# Patient Record
Sex: MALE | Race: OTHER | HISPANIC OR LATINO | Employment: OTHER | URBAN - METROPOLITAN AREA
[De-identification: names, ages, dates, MRNs, and addresses within clinical notes are randomized per-mention and may not be internally consistent; named-entity substitution may affect disease eponyms.]

---

## 2017-11-14 DIAGNOSIS — M48.061 STENOSIS, SPINAL, LUMBAR: ICD-10-CM

## 2017-11-14 DIAGNOSIS — M47.816 LUMBAR SPONDYLOSIS: Primary | ICD-10-CM

## 2017-11-14 DIAGNOSIS — M51.26 DISPLACEMENT OF LUMBAR INTERVERTEBRAL DISC WITHOUT MYELOPATHY: ICD-10-CM

## 2017-11-27 ENCOUNTER — CLINICAL SUPPORT (OUTPATIENT)
Dept: REHABILITATION | Facility: HOSPITAL | Age: 77
End: 2017-11-27
Attending: PAIN MEDICINE
Payer: MEDICARE

## 2017-11-27 DIAGNOSIS — R29.898 WEAKNESS OF BOTH LOWER EXTREMITIES: ICD-10-CM

## 2017-11-27 DIAGNOSIS — M62.81 WEAKNESS OF TRUNK MUSCULATURE: ICD-10-CM

## 2017-11-27 DIAGNOSIS — M25.659 DECREASED RANGE OF MOTION OF HIP: ICD-10-CM

## 2017-11-27 DIAGNOSIS — M67.80 POOR FLEXIBILITY OF TENDON: ICD-10-CM

## 2017-11-27 PROCEDURE — 97161 PT EVAL LOW COMPLEX 20 MIN: CPT | Mod: PN

## 2017-11-27 PROCEDURE — 97110 THERAPEUTIC EXERCISES: CPT | Mod: PN

## 2017-11-27 PROCEDURE — G8978 MOBILITY CURRENT STATUS: HCPCS | Mod: CL,PN

## 2017-11-27 PROCEDURE — G8979 MOBILITY GOAL STATUS: HCPCS | Mod: CK,PN

## 2017-11-27 NOTE — PROGRESS NOTES
Physical Therapy Evaluation    Name: Javid Daniel  Clinic Number: [id number]      Diagnosis:   Encounter Diagnoses   Name Primary?    Weakness of trunk musculature     Poor flexibility of tendon     Decreased range of motion of hip     Weakness of both lower extremities      Physician: Yehuda Degroot Jr.,*  Treatment Orders: PT Eval and Treat    No past medical history on file.  No current outpatient prescriptions on file.     No current facility-administered medications for this visit.      Review of patient's allergies indicates:  Allergies not on file      Evaluation Date: 11/27/2017  Visit # authorized:   Authorization period:   To Vendor Referred By By Location/POS By Department   none Yehuda Degroot Jr., MD [facility]   Priority Start Date Expiration Date Referral Entered By   Routine 11/27/2017 12/31/2017 Tamra Winn   Visits Requested Visits Authorized Visits Completed Visits Scheduled   1 12 0 1     Time Start: 10:00 am  Time End:11:00 am  Total min: 60 min    Ayla Hua is a 77 y.o. male that presents to [facility] outpatient clinic secondary to chronic back pain. Pt ambulates with his son to therapy. Pt speaks most Japanese. Onset of pain was greater 1 years ago. SHI: Pt does not know the mechanism of injury.  Pt states he has greater back pain on left lower back compare to right lower back. Pt states his lower back pain is localized on lower back    Precautions: None  Patient c/o: continuous/intermittent symptoms  Radicular symptoms: No  Onset:: insidious/sudden/gradual   Pain Scale: Javid rates pain on a scale of 0-10 to be 10 at worst; 8 currently; 1 at best .  Aggravating factors: bending forward, sleeping on sides, and lifting weights.   Pain with coughing/sneezing, B&B, sleep disturbance: Yes  Relieving factors: Medications  Previous treatment: Yes  Past surgical history: No  Functional deficits: ADL's  Prior level of function:  Independent  Occupation: retired, work duties include:   Environment: No AD  No cultural or spiritual barriers identified to treatment or learning.  Patient's goals: Pt's goals are decrease lower back pain       Objective     Observation:     Posture Alignment: slouched posture;forward head;    Sensation: intact to light touch    DTR:: intact to light touch    GAIT DEVIATIONS: Javid displays decreased weight shift;decreased arm swing    Lumbar Range of Motion:    %   Flexion WNL with pain     Extension WNL     Left Side Bending WNL   Right Side Bending WNL   Left rotation   WNL   Right Rotation   WNL    *= pain    Passive hip ROM in degrees:     Left Right   IR 32 34   ER 19 25     Lower Extremity Strength    Right LE  Left LE    Hip flexion: 3+/5 Hip flexion: 3+/5   Knee extension: 4/5 Knee extension: 4/5   Knee flexion: 4/5 Knee flexion: 4/5   Hip IR: 3+/5 Hip IR: 3+/5   Hip ER: 3+/5 Hip ER: 3+/5   Hip extension:  3+/5 Hip extension: 3+/5   Hip abduction: 3+/5 Hip abduction: 3+/5   Hip adduction: 3+/5 Hip adduction 3+/5   Ankle dorsiflexion: 4/5 Ankle dorsiflexion: 4/5   Ankle plantarflexion: 4/5 Ankle plantarflexion: 4/5     Special Tests:  -Quadrant testing: positive  -ALEX: negative  -Scour: negative  -Repeated Flexion: positive  -Repeated Ext:negative  -Heel walking: negative  -Toe walking: negative  -Bridge test positive      Neuro Dynamic Testing:    Sciatic nerve:      SLR: negative   Neural Tension:     Slump test: negative    Palpation: Pain and tissue restriction noted in left  Lumbar paraspinal muscles, gluteus sharda, and quadratus lumborum.     Flexibility:    Ely's test: R = 105 degrees ; L = 106 degrees   Popliteal Angle: R = 159 degrees ; L = 160 degrees   Herman's test: NP    CMS Impairment/Limitation/Restriction for FOTO Lumbar Spine Survey  Status Limitation G-Code CMS Severity Modifier  Intake 32% 68% Current Status CL - At least 60 percent but less than 80 percent  Predicted 50% 50% Goal  Status+ CK - At least 40 percent but less than 60 percent  D/C Status CL **only report if this is a one time visit  +Based on FOTO predicted change score    PT Evaluation Completed? Yes  Discussed Plan of Care with patient: Yes    TREATMENT:  Javid received therapeutic exercises to develop strength and endurance, flexibility for 10 minutes including:  Lower trunk rotation  Pelvic tilt  Brace supine marching  Bridging    Javid  received the following manual therapy techniques x 0 min: Joint mobilizations and soft tissue mobilization were applied to the N/A to improve/decrease N/A     Pt received cold pack x 0 min to N/A following treatment.    HEP provided:   Instructed pt. Regarding: Proper technique with all exercises. Pt demo good understanding of the education provided. Javid demonstrated good return demonstration of activities.      Assessment     This is a 77 y.o. male referred to outpatient physical therapy and presents with a medical diagnosis of chronic lower back pain and demonstrates limitations as described in the problem list. Pt only speak [place]ian during initial evaluation. Pt will benefit from physical therapy services in order to maximize pain free functional independence. Pt presented with decrease lower extremity strength, hip range of motion, and hamstring and quadriceps flexibility. Pt presented with positive in bridge test for poor core endurance and weakness. Pt presents with pain during trunk flexion, but no pain during trunk extension. Pt The following goals were discussed with the patient and patient is in agreement with them as to be addressed in the treatment plan. Pt was given a HEP consisting of strengthening. Pt verbally understood the instructions as they were given and demonstrated proper form and technique during therapy. Pt was advised to perform these exercises free of pain, and to stop performing them if pain occurs.     History  Co-morbidities and personal factors that may  impact the plan of care Examination  Body Structures and Functions, activity limitations and participation restrictions that may impact the plan of care    Clinical Presentation   Co-morbidities:   Not identified        Personal Factors:   no deficits Body Regions:   back    Body Systems:    ROM  strength  Flexibility            Participation Restrictions:   Social events      Activity limitations:   Learning and applying knowledge  no deficits    General Tasks and Commands  no deficits    Communication  Citizen of [place] speaker only    Mobility  lifting and carrying objects  walking    Self care  no deficits    Domestic Life  no deficits    Interactions/Relationships  no deficits    Life Areas  no deficits    Community and Social Life  no deficits         stable and uncomplicated                      Complexity: low  FOTO see above           Prognosis: Fair    Anticipated barriers to physical therapy: Pt only speak Indonesian.     Medical necessity is demonstrated by the following IMPAIRMENTS/PROBLEM LIST:   1) Increase in pain level limiting function   2) Decrease muscle strength   3) Decrease ROM   4) Decrease flexibility   5) Lack of HEP          GOALS: Short Term Goals:  2-3 weeks  1. Report decreased in pain at worse less than  <   / =  5  /10  to increase tolerance for functional activities  2. Pt to increase B popliteal angle by greater than > or = 5 degrees in order to improve flexibility and posture.   3. Increased bilateral lower extremity MMT 1/2  to increase tolerance for ADL and work activities.  4. Pt to increase B Ely's Test by greater than  > or = 5 degrees in order to improve flexibility and posture.   5. Pt to tolerate HEP to improve ROM and independence with ADL's  6. Pt to improve hip range of motion by 25% to allow for improved functional mobility to allow for improvement in IADLs.     Long Term Goals: 6-8 weeks  1. Report decreased in pain at worse less than  <   / =  0 or 1  /10  to increase tolerance  for functional mobility.   2 .Pt to increase B popliteal angle by greater than > or = 10 degrees in order to improve flexibility and posture.   3. Increased bilateral lower extremity MMT 1 grade to increase tolerance for ADL and work activities.  4. Pt will report at 50% or less limitation on FOTO lumbar spine survey  to demonstrate decrease in disability and improvement in back pain.  5. Pt to be Independent with HEP to improve ROM and independence with ADL's  6. Pt to increase B Ely's Test by greater than > or = 10 degrees in order to improve flexibility and posture.   7. Pt to demonstrate negative Bridge Test in order to show improved core strength for lumbar stabilization.   8. Pt to improve  Hip range of motion by 75% to allow for improved functional mobility to allow for improvement in IADLs.       Plan     Pt will be treated by physical therapy 1-2 times a week for 6-8 weeks for Pt Education, HEP, therapeutic exercises, neuromuscular re-education, manual therapy, joint mobilizations, taping techniques, functional dry needling, modalities prn to achieve established goals. Javid may at times be seen by a PTA as part of the Rehab Team. Cont PT for 6-8  weeks.     Certification date: 11/27/2017 to 02/27/2017    I certify the need for these services furnished under this plan of treatment and while under my care.______________________________ Physician/Referring Practitioner  Date of Signature    Travis Ceballos PT, DPT  Date:11/27/2017

## 2017-12-05 ENCOUNTER — CLINICAL SUPPORT (OUTPATIENT)
Dept: REHABILITATION | Facility: HOSPITAL | Age: 77
End: 2017-12-05
Attending: PAIN MEDICINE
Payer: MEDICARE

## 2017-12-05 DIAGNOSIS — R29.898 WEAKNESS OF BOTH LOWER EXTREMITIES: ICD-10-CM

## 2017-12-05 DIAGNOSIS — M62.81 WEAKNESS OF TRUNK MUSCULATURE: ICD-10-CM

## 2017-12-05 DIAGNOSIS — M25.659 DECREASED RANGE OF MOTION OF HIP: ICD-10-CM

## 2017-12-05 DIAGNOSIS — M67.80 POOR FLEXIBILITY OF TENDON: ICD-10-CM

## 2017-12-05 PROCEDURE — 97110 THERAPEUTIC EXERCISES: CPT | Mod: PN

## 2017-12-05 NOTE — PROGRESS NOTES
Physical Therapy Daily Note     Name: Javid Daniel  Clinic Number: [id number]  Diagnosis:   Encounter Diagnoses   Name Primary?    Weakness of trunk musculature     Poor flexibility of tendon     Decreased range of motion of hip     Weakness of both lower extremities      Physician: Yehuda Degroot Jr.,*  Precautions: Standard  Visit #: 2 of 12  LENZ:12/31/2017  PTA Visit #: 0    Time In: 9:30 am  Time Out: 10:30 am   Total time: 60 min (1:1 with PT for 30' )    Subjective     Pt reports: that his pain is a little today. Pt states he has more morning pain. Pt reports his lower back pain is less at night  Pain Scale: Javid rates pain on a scale of 0-10 to be 2 currently. Lumbar pain.     Objective     Javid received individual therapeutic exercises to develop strength, endurance, ROM, flexibility and core stabilization for 53 minutes including:    Nustetp 8 min  Lower trunk rotation 3x10  Pelvic tilt 1x2 min  Brace supine marching 3x10   Prayer stretch 3x45 sec hold  Supine Hamstring stretch 4x30' hold  Prone quadriceps stretch 4x30' hold  Supine hip add ball squeeze 3x10  Bridging 3x10  SL hip abd 3x10  SL hip add 3x10   Prone hip extension 3x10  Standing Marching 3x10  Standing mini squat 3x10    Javid received the following manual therapy techniques: Soft tissue Mobilization were applied to the: N/a for 00 minutes including:      Written Home Exercises Provided:   Pt demo good understanding of the education provided. Javid demonstrated good return demonstration of activities.     Education provided re:  Javid verbalized good understanding of education provided.   No spiritual or educational barriers to learning provided    Assessment     Pt speak mainly Uzbek during therapy. Patient tolerated Therapy well today. No provocation in lower back pain was noted. Pt tolerated initial therapy well with several v/c's to perform exercises with proper form.  V/c's also required to engaged transversus abdominus during exercises. Pt's lower back pain has subsided in the end of therapy. Continue Skilled therapy session to achieve PT and patient;s goals.   This is a 77 y.o. male referred to outpatient physical therapy and presents with a medical diagnosis of chronic lower back pain  and demonstrates limitations as described in the problem list. Pt prognosis is Fair. Pt will continue to benefit from skilled outpatient physical therapy to address the deficits listed in the problem list, provide pt/family education and to maximize pt's level of independence in the home and community environment.     Goals as follows:  Short Term Goals:  2-3 weeks  1. Report decreased in pain at worse less than  <   / =  5  /10  to increase tolerance for functional activities  2. Pt to increase B popliteal angle by greater than > or = 5 degrees in order to improve flexibility and posture.   3. Increased bilateral lower extremity MMT 1/2  to increase tolerance for ADL and work activities.  4. Pt to increase B Ely's Test by greater than  > or = 5 degrees in order to improve flexibility and posture.   5. Pt to tolerate HEP to improve ROM and independence with ADL's  6. Pt to improve hip range of motion by 25% to allow for improved functional mobility to allow for improvement in IADLs.      Long Term Goals: 6-8 weeks  1. Report decreased in pain at worse less than  <   / =  0 or 1  /10  to increase tolerance for functional mobility.   2 .Pt to increase B popliteal angle by greater than > or = 10 degrees in order to improve flexibility and posture.   3. Increased bilateral lower extremity MMT 1 grade to increase tolerance for ADL and work activities.  4. Pt will report at 50% or less limitation on FOTO lumbar spine survey  to demonstrate decrease in disability and improvement in back pain.  5. Pt to be Independent with HEP to improve ROM and independence with ADL's  6. Pt to increase B Ely's Test  by greater than > or = 10 degrees in order to improve flexibility and posture.   7. Pt to demonstrate negative Bridge Test in order to show improved core strength for lumbar stabilization.   8. Pt to improve  Hip range of motion by 75% to allow for improved functional mobility to allow for improvement in IADLs.      Plan     Continue with established Plan of Care towards PT goals.    Certification date: 11/27/2017 to 02/27/2017    Therapist: Travis Stover, PT  12/5/2017

## 2017-12-07 ENCOUNTER — CLINICAL SUPPORT (OUTPATIENT)
Dept: REHABILITATION | Facility: HOSPITAL | Age: 77
End: 2017-12-07
Attending: PAIN MEDICINE
Payer: MEDICARE

## 2017-12-07 DIAGNOSIS — M25.659 DECREASED RANGE OF MOTION OF HIP: ICD-10-CM

## 2017-12-07 DIAGNOSIS — R29.898 WEAKNESS OF BOTH LOWER EXTREMITIES: ICD-10-CM

## 2017-12-07 DIAGNOSIS — M67.80 POOR FLEXIBILITY OF TENDON: ICD-10-CM

## 2017-12-07 DIAGNOSIS — M62.81 WEAKNESS OF TRUNK MUSCULATURE: ICD-10-CM

## 2017-12-07 PROCEDURE — 97110 THERAPEUTIC EXERCISES: CPT | Mod: PN

## 2017-12-07 NOTE — PROGRESS NOTES
Physical Therapy Daily Note     Name: Javid Daniel  Clinic Number: [id number]  Diagnosis:   Encounter Diagnoses   Name Primary?    Weakness of trunk musculature     Poor flexibility of tendon     Decreased range of motion of hip     Weakness of both lower extremities      Physician: Yehuda Degroot Jr.,*  Precautions: Standard  Visit #: 3 of 12  LENZ:12/31/2017  PTA Visit #: 0    Time In: 9:40 am  Time Out: 10:40 am   Total time: 60 min (1:1 with PT for 55 )    Subjective     Pt reports: that his pain had increased in lower back increased after last session but pain had subsided at night. Pt states today he is moderate pain.   Pain Scale: Javid rates pain on a scale of 0-10 to be 4 currently. Lumbar pain.     Objective     Javid received individual therapeutic exercises to develop strength, endurance, ROM, flexibility and core stabilization for 53 minutes including:    Nustetp 8 min  Lower trunk rotation 3x10  DKTC using green yoga ball 3x15   Pelvic tilt 1x2 min  Brace supine marching 3x10   Prayer stretch 3x45 sec hold  Supine Hamstring stretch 4x30' hold  Prone quadriceps stretch 4x30' hold  Supine hip add ball squeeze 3x10  Bridges with ball squeeze 3x10  SL hip abd 3x10  SL hip add 3x10   Prone hip extension 3x10  Standing Marching 3x10  Standing mini squat 3x10  Multifidus side step 1x2min    Javid received the following manual therapy techniques: Soft tissue Mobilization were applied to the: N/a for 00 minutes including:      Written Home Exercises Provided:   Pt demo good understanding of the education provided. Javid demonstrated good return demonstration of activities.     Education provided re:  Javid verbalized good understanding of education provided.   No spiritual or educational barriers to learning provided    Assessment     Pt speak mainly Slovenian during therapy. Patient tolerated Therapy well today. Pt did not have lumbar pain during  therapy. Pt demonstrated lower extremity weakness and difficult with muscle endurance. Pt tolerated well multifidus exercise and bridges with ball squeeze.. V/c's required to activate transversus abdominus. Pt was instructed that he might feel a little sore tomorrow. Continue Skilled therapy session to achieve PT and patient;s goals.   This is a 77 y.o. male referred to outpatient physical therapy and presents with a medical diagnosis of chronic lower back pain  and demonstrates limitations as described in the problem list. Pt prognosis is Fair. Pt will continue to benefit from skilled outpatient physical therapy to address the deficits listed in the problem list, provide pt/family education and to maximize pt's level of independence in the home and community environment.     Goals as follows:  Short Term Goals:  2-3 weeks (12/18/2017)  1. Report decreased in pain at worse less than  <   / =  5  /10  to increase tolerance for functional activities  2. Pt to increase B popliteal angle by greater than > or = 5 degrees in order to improve flexibility and posture.   3. Increased bilateral lower extremity MMT 1/2  to increase tolerance for ADL and work activities.  4. Pt to increase B Ely's Test by greater than  > or = 5 degrees in order to improve flexibility and posture.   5. Pt to tolerate HEP to improve ROM and independence with ADL's  6. Pt to improve hip range of motion by 25% to allow for improved functional mobility to allow for improvement in IADLs.      Long Term Goals: 6-8 weeks  1. Report decreased in pain at worse less than  <   / =  0 or 1  /10  to increase tolerance for functional mobility.   2 .Pt to increase B popliteal angle by greater than > or = 10 degrees in order to improve flexibility and posture.   3. Increased bilateral lower extremity MMT 1 grade to increase tolerance for ADL and work activities.  4. Pt will report at 50% or less limitation on FOTO lumbar spine survey  to demonstrate decrease  in disability and improvement in back pain.  5. Pt to be Independent with HEP to improve ROM and independence with ADL's  6. Pt to increase B Ely's Test by greater than > or = 10 degrees in order to improve flexibility and posture.   7. Pt to demonstrate negative Bridge Test in order to show improved core strength for lumbar stabilization.   8. Pt to improve  Hip range of motion by 75% to allow for improved functional mobility to allow for improvement in IADLs.      Plan     Continue with established Plan of Care towards PT goals.    Certification date: 11/27/2017 to 02/27/2017    Therapist: Travis Stover, PT  12/7/2017

## 2017-12-11 ENCOUNTER — CLINICAL SUPPORT (OUTPATIENT)
Dept: REHABILITATION | Facility: HOSPITAL | Age: 77
End: 2017-12-11
Attending: PAIN MEDICINE
Payer: MEDICARE

## 2017-12-11 DIAGNOSIS — M67.80 POOR FLEXIBILITY OF TENDON: ICD-10-CM

## 2017-12-11 DIAGNOSIS — M25.659 DECREASED RANGE OF MOTION OF HIP: ICD-10-CM

## 2017-12-11 DIAGNOSIS — R29.898 WEAKNESS OF BOTH LOWER EXTREMITIES: ICD-10-CM

## 2017-12-11 DIAGNOSIS — M62.81 WEAKNESS OF TRUNK MUSCULATURE: Primary | ICD-10-CM

## 2017-12-11 PROCEDURE — 97110 THERAPEUTIC EXERCISES: CPT | Mod: PN

## 2017-12-11 NOTE — PROGRESS NOTES
Physical Therapy Daily Note     Name: Javid Daniel  Clinic Number: [id number]  Diagnosis:   Encounter Diagnoses   Name Primary?    Weakness of trunk musculature Yes    Poor flexibility of tendon     Decreased range of motion of hip     Weakness of both lower extremities      Physician: Yehuda Degroot Jr.,*  Precautions: Standard  Visit #: 4 of 12  LENZ:12/31/2017  PTA Visit #: 0    Time In: 10:00  Time Out: 11:00     Total time: 60 min     Subjective     Pt reports: Pt states the low back is doing a little better. Reports compliance with home program.   Pain Scale: Javid rates pain on a scale of 0-10 to be 4 currently. Lumbar pain.     Objective     Javid received individual therapeutic exercises to develop strength, endurance, ROM, flexibility and core stabilization for 55 minutes including:    Nustetp 8 min  Lower trunk rotation 3x10  DKTC using green yoga ball 3x15   Pelvic tilt 1x2 min  Brace supine marching 3x10   Prayer stretch 3x45 sec hold  Supine Hamstring stretch 4x30' hold  Prone quadriceps stretch 4x30' hold  Supine hip add ball squeeze 3x10  Bridges with ball squeeze 3x10  SL hip abd 3x10  SL hip add 3x10   Prone hip extension 3x10  Standing Marching 3x10  Standing mini squat 3x10  Multifidus side step 1x2min    Javid received the following manual therapy techniques: Soft tissue Mobilization were applied to the: N/a for 00 minutes including:      Written Home Exercises Provided:   Pt demo good understanding of the education provided. Javid demonstrated good return demonstration of activities.     Education provided re:  Javid verbalized good understanding of education provided.   No spiritual or educational barriers to learning provided    Assessment     No c/o increased discomfort with prescribed activities. Good response to exercise progression.  Continue Skilled therapy session to achieve PT and patient;s goals. This is a 77 y.o. male  referred to outpatient physical therapy and presents with a medical diagnosis of chronic lower back pain  and demonstrates limitations as described in the problem list. Pt prognosis is Fair. Pt will continue to benefit from skilled outpatient physical therapy to address the deficits listed in the problem list, provide pt/family education and to maximize pt's level of independence in the home and community environment.     Goals as follows:  Short Term Goals:  2-3 weeks (12/18/2017)  1. Report decreased in pain at worse less than  <   / =  5  /10  to increase tolerance for functional activities  2. Pt to increase B popliteal angle by greater than > or = 5 degrees in order to improve flexibility and posture.   3. Increased bilateral lower extremity MMT 1/2  to increase tolerance for ADL and work activities.  4. Pt to increase B Ely's Test by greater than  > or = 5 degrees in order to improve flexibility and posture.   5. Pt to tolerate HEP to improve ROM and independence with ADL's  6. Pt to improve hip range of motion by 25% to allow for improved functional mobility to allow for improvement in IADLs.      Long Term Goals: 6-8 weeks  1. Report decreased in pain at worse less than  <   / =  0 or 1  /10  to increase tolerance for functional mobility.   2 .Pt to increase B popliteal angle by greater than > or = 10 degrees in order to improve flexibility and posture.   3. Increased bilateral lower extremity MMT 1 grade to increase tolerance for ADL and work activities.  4. Pt will report at 50% or less limitation on FOTO lumbar spine survey  to demonstrate decrease in disability and improvement in back pain.  5. Pt to be Independent with HEP to improve ROM and independence with ADL's  6. Pt to increase B Ely's Test by greater than > or = 10 degrees in order to improve flexibility and posture.   7. Pt to demonstrate negative Bridge Test in order to show improved core strength for lumbar stabilization.   8. Pt to improve   Hip range of motion by 75% to allow for improved functional mobility to allow for improvement in IADLs.      Plan     Continue with established Plan of Care towards PT goals.    Certification date: 11/27/2017 to 02/27/2017    Therapist: Gregg Ford, PT  12/11/2017

## 2017-12-14 ENCOUNTER — CLINICAL SUPPORT (OUTPATIENT)
Dept: REHABILITATION | Facility: HOSPITAL | Age: 77
End: 2017-12-14
Attending: PAIN MEDICINE
Payer: MEDICARE

## 2017-12-14 DIAGNOSIS — M62.81 WEAKNESS OF TRUNK MUSCULATURE: Primary | ICD-10-CM

## 2017-12-14 DIAGNOSIS — R29.898 WEAKNESS OF BOTH LOWER EXTREMITIES: ICD-10-CM

## 2017-12-14 DIAGNOSIS — M25.659 DECREASED RANGE OF MOTION OF HIP: ICD-10-CM

## 2017-12-14 DIAGNOSIS — M67.80 POOR FLEXIBILITY OF TENDON: ICD-10-CM

## 2017-12-14 PROCEDURE — 97110 THERAPEUTIC EXERCISES: CPT | Mod: PN

## 2017-12-14 NOTE — PROGRESS NOTES
Physical Therapy Daily Note     Name: Javid Daniel  Clinic Number: [id number]  Diagnosis:   Encounter Diagnoses   Name Primary?    Weakness of trunk musculature Yes    Poor flexibility of tendon     Weakness of both lower extremities     Decreased range of motion of hip      Physician: Yehuda Degroot Jr.,*  Precautions: Standard  Visit #: 5 of 12  LENZ:12/31/2017  PTA Visit #: 1    Time In: 1055  Time Out: 1155  Total Treatment Time: 60 minutes (1:1 with PTA 30 minutes of treatment session)     Subjective     Pt reports: Javid states the middle and left side of his lower back are sore today.   Pain Scale: Javid rates pain on a scale of 0-10 to be 4 currently, lumbar pain.     Objective     Javid received individual therapeutic exercises to develop strength, endurance, ROM, flexibility and core stabilization for 55 minutes including:    Nustetp x 8 min  Lower trunk rotation 3x10  DKTC using green yoga ball 3x15   Pelvic tilt 1x2 min  Brace supine marching 3x10   Supine Hamstring stretch 4x30' hold  Supine hip add ball squeeze 3x10  Bridges with ball squeeze 3x10  SL hip abd 3x10  SL hip add 3x10   Prone hip extension 3x10  Prone quadriceps stretch 4x30' hold  Prayer stretch 3x45 sec hold  Standing Marching 3x10  Standing mini squat 3x10  Multifidus side step 1 x 2min    Javid received the following manual therapy techniques: Soft tissue Mobilization were applied to the: N/a for 00 minutes including:    Written Home Exercises Provided:   Pt demo good understanding of the education provided. Javid demonstrated good return demonstration of activities.     Education provided re:  Javid verbalized good understanding of education provided.   No spiritual or educational barriers to learning provided    Assessment     Manual tolerated treatment well today. Patient demonstrates increased difficulty performing posterior pelvic tilt without elevation with heavy  cues needed for correction. Patient also requires verbal and tactile cueing to promote sidelying position with hip abduction/adduction in order to achieve proper muscle activation. Patient demonstrates very good tolerance to strengthening exercises today with no complaints of increased lower back pain.   Continue Skilled therapy session to achieve PT and patients goals. This is a 77 y.o. male referred to outpatient physical therapy and presents with a medical diagnosis of chronic lower back pain  and demonstrates limitations as described in the problem list. Pt prognosis is Fair. Pt will continue to benefit from skilled outpatient physical therapy to address the deficits listed in the problem list, provide pt/family education and to maximize pt's level of independence in the home and community environment.     Goals as follows:  Short Term Goals:  2-3 weeks (12/18/2017)  1. Report decreased in pain at worse less than  <   / =  5  /10  to increase tolerance for functional activities  2. Pt to increase B popliteal angle by greater than > or = 5 degrees in order to improve flexibility and posture.   3. Increased bilateral lower extremity MMT 1/2  to increase tolerance for ADL and work activities.  4. Pt to increase B Ely's Test by greater than  > or = 5 degrees in order to improve flexibility and posture.   5. Pt to tolerate HEP to improve ROM and independence with ADL's  6. Pt to improve hip range of motion by 25% to allow for improved functional mobility to allow for improvement in IADLs.      Long Term Goals: 6-8 weeks  1. Report decreased in pain at worse less than  <   / =  0 or 1  /10  to increase tolerance for functional mobility.   2 .Pt to increase B popliteal angle by greater than > or = 10 degrees in order to improve flexibility and posture.   3. Increased bilateral lower extremity MMT 1 grade to increase tolerance for ADL and work activities.  4. Pt will report at 50% or less limitation on FOTO lumbar  spine survey  to demonstrate decrease in disability and improvement in back pain.  5. Pt to be Independent with HEP to improve ROM and independence with ADL's  6. Pt to increase B Ely's Test by greater than > or = 10 degrees in order to improve flexibility and posture.   7. Pt to demonstrate negative Bridge Test in order to show improved core strength for lumbar stabilization.   8. Pt to improve  Hip range of motion by 75% to allow for improved functional mobility to allow for improvement in IADLs.      Plan     Continue with established Plan of Care towards PT goals.    Certification date: 11/27/2017 to 02/27/2017    Therapist: Mary Ann Trinh, PTA  12/14/2017

## 2017-12-19 ENCOUNTER — CLINICAL SUPPORT (OUTPATIENT)
Dept: REHABILITATION | Facility: HOSPITAL | Age: 77
End: 2017-12-19
Attending: PAIN MEDICINE
Payer: MEDICARE

## 2017-12-19 DIAGNOSIS — M62.81 WEAKNESS OF TRUNK MUSCULATURE: ICD-10-CM

## 2017-12-19 DIAGNOSIS — R29.898 WEAKNESS OF BOTH LOWER EXTREMITIES: ICD-10-CM

## 2017-12-19 DIAGNOSIS — M25.659 DECREASED RANGE OF MOTION OF HIP: ICD-10-CM

## 2017-12-19 DIAGNOSIS — M67.80 POOR FLEXIBILITY OF TENDON: ICD-10-CM

## 2017-12-19 PROCEDURE — 97110 THERAPEUTIC EXERCISES: CPT | Mod: PN

## 2017-12-19 NOTE — PROGRESS NOTES
Physical Therapy Daily Note     Name: Javid Daniel  Clinic Number: [id number]  Diagnosis:   Encounter Diagnoses   Name Primary?    Weakness of trunk musculature     Poor flexibility of tendon     Decreased range of motion of hip     Weakness of both lower extremities      Physician: Yehuda Degroot Jr.,*  Precautions: Standard  Visit #: 6 of 12  LENZ:12/31/2017  PTA Visit #: 1    Time In: 10:00 am  Time Out: 11:00 am  Total Treatment Time: 60 minutes (1:1 with PTA30 minutes of treatment session)     Subjective     Pt reports: That he is feeling well today. But he feels minor pain when he perform heavy activities at home.   Pain Scale: Javid rates pain on a scale of 0-10 to be 4 currently, lumbar pain.     Objective   CMS Impairment/Limitation/Restriction for FOTO Lumbar Spine Survey  Status Limitation G-Code CMS Severity Modifier  Intake 32% 68%  Predicted 50% 50% Goal Status+ CK - At least 40 percent but less than 60 percent  12/19/2017 53% 47% Current Status CK - At least 40 percent but less than 60 percent  D/C Status CK **only report if this is discharge survey  +Based on FOTO predicted change score    Passive hip ROM in degrees:       Left Right   IR 40 34   ER 30 25      Lower Extremity Strength     Right LE   Left LE     Hip flexion: 4/5 Hip flexion: 4/5   Knee extension: 4+/5 Knee extension: 4+/5   Knee flexion: 4+/5 Knee flexion: 4+/5   Hip IR: 4/5 Hip IR: 4/5   Hip ER: 4/5 Hip ER: 4/5   Hip extension:  4/5 Hip extension: 4/5   Hip abduction: 4/5 Hip abduction: 4/5   Hip adduction: 4/5 Hip adduction 4/5   Ankle dorsiflexion: 4+/5 Ankle dorsiflexion: 4+/5   Ankle plantarflexion: 4+/5 Ankle plantarflexion: 4+/5         Flexibility:               Ely's test: R = 126 degrees ; L = 106 degrees              Popliteal Angle: R = 169 degrees ; L = 180 degrees                Javid received individual therapeutic exercises to develop strength, endurance,  ROM, flexibility and core stabilization for 55 minutes including:    Nustetp x 8 min  Lower trunk rotation 3x10  DKTC using green yoga ball 3x15   Pelvic tilt 1x2 min  Brace supine marching 3x10   Supine Hamstring stretch 4x30' hold  Supine hip add ball squeeze 3x10  Bridges with ball squeeze 3x10  SL hip abd 3x10  SL hip add 3x10   Prone hip extension 3x10  Prone quadriceps stretch 4x30' hold  Prayer stretch 3x45 sec hold  Standing Marching 3x10  Standing mini squat 3x10    Multifidus side step 1 x 2min  Matrix trunk rotation 3x10   22#  Matrix trunk extension 3x10   Matrix seated row 3x10   Matrix leg press 3x10    Javid received the following manual therapy techniques: Soft tissue Mobilization were applied to the: N/a for 00 minutes including:    Written Home Exercises Provided:   Pt demo good understanding of the education provided. Javid demonstrated good return demonstration of activities.     Education provided re:  Javid verbalized good understanding of education provided.   No spiritual or educational barriers to learning provided    Assessment     Manual tolerated treatment well today. Pt did not have any adverse effect throughout therapy and no provocation in pain noted. Pt continues to required v/c's during posterior pelvic tilts to avoid trunk elevation. Pt responded well to progression of lower back strengthening exercises including peripheral lower back strengthening exercises. Pt demonstrated today improvemen hip range of motion and quadriceps and hamstring flexibility since initial evaluation. Pt lower extremity muscle strength has improved since initial evaluation. FOTO lumbar spine survey has demonstrated improvement since intitialevaluation. Overall, pt is progression well towards PT and patient's goal.   PT/PTA face to face conference performed during today's treatment session. Patient's goals and POC updated today.   Continue Skilled therapy session to achieve PT and patients goals. This is  a 77 y.o. male referred to outpatient physical therapy and presents with a medical diagnosis of chronic lower back pain  and demonstrates limitations as described in the problem list. Pt prognosis is Fair. Pt will continue to benefit from skilled outpatient physical therapy to address the deficits listed in the problem list, provide pt/family education and to maximize pt's level of independence in the home and community environment.     Goals as follows:  Short Term Goals:  2-3 weeks (12/18/2017)  1. Report decreased in pain at worse less than  <   / =  5  /10  to increase tolerance for functional activities. (goal met)  2. Pt to increase B popliteal angle by greater than > or = 5 degrees in order to improve flexibility and posture. (goal met)   3. Increased bilateral lower extremity MMT 1/2  to increase tolerance for ADL and work activities. (goal met)  4. Pt to increase B Ely's Test by greater than  > or = 5 degrees in order to improve flexibility and posture.   5. Pt to tolerate HEP to improve ROM and independence with ADL's (goal met)  6. Pt to improve hip range of motion by 25% to allow for improved functional mobility to allow for improvement in IADLs. (goal met)      Long Term Goals: 6-8 weeks  1. Report decreased in pain at worse less than  <   / =  0 or 1  /10  to increase tolerance for functional mobility.   2 .Pt to increase B popliteal angle by greater than > or = 10 degrees in order to improve flexibility and posture.   3. Increased bilateral lower extremity MMT 1 grade to increase tolerance for ADL and work activities.  4. Pt will report at 50% or less limitation on FOTO lumbar spine survey  to demonstrate decrease in disability and improvement in back pain.  5. Pt to be Independent with HEP to improve ROM and independence with ADL's  6. Pt to increase B Ely's Test by greater than > or = 10 degrees in order to improve flexibility and posture.   7. Pt to demonstrate negative Bridge Test in order to  show improved core strength for lumbar stabilization.   8. Pt to improve  Hip range of motion by 75% to allow for improved functional mobility to allow for improvement in IADLs.      Plan     Continue with established Plan of Care towards PT goals.    Certification date: 11/27/2017 to 02/27/2017    Therapist: Travis Stover, PT  12/19/2017

## 2017-12-21 ENCOUNTER — CLINICAL SUPPORT (OUTPATIENT)
Dept: REHABILITATION | Facility: HOSPITAL | Age: 77
End: 2017-12-21
Attending: PAIN MEDICINE
Payer: MEDICARE

## 2017-12-21 DIAGNOSIS — M25.659 DECREASED RANGE OF MOTION OF HIP: ICD-10-CM

## 2017-12-21 DIAGNOSIS — M62.81 WEAKNESS OF TRUNK MUSCULATURE: ICD-10-CM

## 2017-12-21 DIAGNOSIS — R29.898 WEAKNESS OF BOTH LOWER EXTREMITIES: ICD-10-CM

## 2017-12-21 DIAGNOSIS — M67.80 POOR FLEXIBILITY OF TENDON: ICD-10-CM

## 2017-12-21 PROCEDURE — 97110 THERAPEUTIC EXERCISES: CPT | Mod: PN

## 2017-12-21 NOTE — PROGRESS NOTES
Physical Therapy Daily Note     Name: Javid Daniel  Clinic Number: [id number]  Diagnosis:   Encounter Diagnoses   Name Primary?    Weakness of trunk musculature     Poor flexibility of tendon     Decreased range of motion of hip     Weakness of both lower extremities      Physician: Yehuda Degroot Jr.,*  Precautions: Standard  Visit #: 7 of 12  LENZ:12/31/2017  PTA Visit #: 1    Time In: 10:00 am  Time Out: 11:00 am  Total Treatment Time: 60 minutes (1:1 with PT 40 minutes of treatment session)     Subjective     Pt reports: That he is feeling good today. Pt states he noticed pelvic tilt exercises cause pain on his back. Pt is compliant with HEP.   Pain Scale: Javid rates pain on a scale of 0-10 to be 2 currently, lumbar pain.     Objective                   Javid received individual therapeutic exercises to develop strength, endurance, ROM, flexibility and core stabilization for 55 minutes including:    Nustetp x 8 min  Upright bike 6 min   Lower trunk rotation alternating UE 3x10  DKTC using green yoga ball 3x15   Pelvic tilt 1x2 min  Brace supine marching 3x10   Supine Hamstring stretch 4x30' hold  Supine hip add ball squeeze 3x10  Bridges with ball squeeze 3x10  SL hip abd 3x10  SL hip add 3x10   Prone hip extension 3x10  Prone quadriceps stretch 4x30' hold  Prayer stretch 3x45 sec hold  Standing Marching 3x10  Standing mini squat 3x10    Multifidus side step 1 x 2min  Matrix trunk rotation 3x10   22#  Matrix trunk extension 3x10 21#  Matrix seated row 3x10 15#  Matrix leg press 3x10 10#   Seated ball roll out 3 direction 1x2'     Javid received the following manual therapy techniques: Soft tissue Mobilization were applied to the: N/a for 00 minutes including:    Written Home Exercises Provided:   Pt demo good understanding of the education provided. Javid demonstrated good return demonstration of activities.     Education provided re:  Javid  verbalized good understanding of education provided.   No spiritual or educational barriers to learning provided    Assessment     Manual tolerated treatment well today. Pt did not have any adverse effect throughout therapy and no provocation in pain noted. Pt presented with pain during pelvic tilts exercises so pelvic tilt was not performed today. Pt tolerated very well progression of exercises and new lower back strengthening exercises.V/c's were required to perform exercises in slow and control motion. Pt was instructed to perform exercises pain free zones. Pt continues to work on quadriceps and hamstring flexibility. Continue skilled PT services to achieve PT and patient;'s goals.  Continue Skilled therapy session to achieve PT and patients goals. This is a 77 y.o. male referred to outpatient physical therapy and presents with a medical diagnosis of chronic lower back pain  and demonstrates limitations as described in the problem list. Pt prognosis is Fair. Pt will continue to benefit from skilled outpatient physical therapy to address the deficits listed in the problem list, provide pt/family education and to maximize pt's level of independence in the home and community environment.     Goals as follows:  Short Term Goals:  2-3 weeks (12/18/2017)  1. Report decreased in pain at worse less than  <   / =  5  /10  to increase tolerance for functional activities. (goal met)  2. Pt to increase B popliteal angle by greater than > or = 5 degrees in order to improve flexibility and posture. (goal met)   3. Increased bilateral lower extremity MMT 1/2  to increase tolerance for ADL and work activities. (goal met)  4. Pt to increase B Ely's Test by greater than  > or = 5 degrees in order to improve flexibility and posture.   5. Pt to tolerate HEP to improve ROM and independence with ADL's (goal met)  6. Pt to improve hip range of motion by 25% to allow for improved functional mobility to allow for improvement in IADLs.  (goal met)      Long Term Goals: 6-8 weeks  1. Report decreased in pain at worse less than  <   / =  0 or 1  /10  to increase tolerance for functional mobility.   2 .Pt to increase B popliteal angle by greater than > or = 10 degrees in order to improve flexibility and posture.   3. Increased bilateral lower extremity MMT 1 grade to increase tolerance for ADL and work activities.  4. Pt will report at 50% or less limitation on FOTO lumbar spine survey  to demonstrate decrease in disability and improvement in back pain.  5. Pt to be Independent with HEP to improve ROM and independence with ADL's  6. Pt to increase B Ely's Test by greater than > or = 10 degrees in order to improve flexibility and posture.   7. Pt to demonstrate negative Bridge Test in order to show improved core strength for lumbar stabilization.   8. Pt to improve  Hip range of motion by 75% to allow for improved functional mobility to allow for improvement in IADLs.      Plan     Continue with established Plan of Care towards PT goals.    Certification date: 11/27/2017 to 02/27/2017    Therapist: Travis Stover, PT  12/21/2017

## 2017-12-26 ENCOUNTER — CLINICAL SUPPORT (OUTPATIENT)
Dept: REHABILITATION | Facility: HOSPITAL | Age: 77
End: 2017-12-26
Attending: PAIN MEDICINE
Payer: MEDICARE

## 2017-12-26 DIAGNOSIS — M67.80 POOR FLEXIBILITY OF TENDON: ICD-10-CM

## 2017-12-26 DIAGNOSIS — R29.898 WEAKNESS OF BOTH LOWER EXTREMITIES: ICD-10-CM

## 2017-12-26 DIAGNOSIS — M62.81 WEAKNESS OF TRUNK MUSCULATURE: ICD-10-CM

## 2017-12-26 DIAGNOSIS — M25.659 DECREASED RANGE OF MOTION OF HIP: ICD-10-CM

## 2017-12-26 PROCEDURE — 97110 THERAPEUTIC EXERCISES: CPT | Mod: PN

## 2017-12-26 NOTE — PROGRESS NOTES
Physical Therapy Daily Note     Name: Javid Daniel  Clinic Number: [id number]  Diagnosis:   Encounter Diagnoses   Name Primary?    Weakness of trunk musculature     Poor flexibility of tendon     Decreased range of motion of hip     Weakness of both lower extremities      Physician: Yehuda Degroot Jr.,*  Precautions: Standard  Visit #: 8 of 12  LENZ:12/31/2017  PTA Visit #: 1    Time In: 10:00 am  Time Out: 11:00 am  Total Treatment Time: 60 minutes (1:1 with PT 30 minutes of treatment session)     Subjective     Pt reports: That he is feeling good today. Pt states he noticed pelvic tilt exercises cause pain on his back. Pt is compliant with HEP.   Pain Scale: Javid rates pain on a scale of 0-10 to be 2 currently, lumbar pain.     Objective                   Javid received individual therapeutic exercises to develop strength, endurance, ROM, flexibility and core stabilization for 55 minutes including:    Nustetp x 8 min  Upright bike 6 min   Lower trunk rotation alternating UE 3x10  DKTC using green yoga ball 3x15   Pelvic tilt 1x2 min  Brace supine marching 3x10   Supine Hamstring stretch 4x30' hold  Supine hip add ball squeeze 3x10  Bridges with ball squeeze 3x10  SL hip abd 3x10  SL hip add 3x10   Prone hip extension 3x10  Prone quadriceps stretch 4x30' hold  Prayer stretch 3x45 sec hold  Standing Marching 3x10  Standing mini squat 3x10    Bird dog 1x10  Multifidus side step 1 x 2min  Matrix trunk rotation 3x10   22#  Matrix trunk extension 3x10 21#  Matrix seated row 3x10 15#  Matrix leg press 3x10 20#   Seated ball roll out 3 direction 1x2'   Matrix leg extension 3x10 10#   Matrix knee flexion: add next visit     Javid received the following manual therapy techniques: Soft tissue Mobilization were applied to the: N/a for 00 minutes including:    Written Home Exercises Provided:   Pt demo good understanding of the education provided. Javid  demonstrated good return demonstration of activities.     Education provided re:  Javid verbalized good understanding of education provided.   No spiritual or educational barriers to learning provided    Assessment     Manual tolerated treatment well today. Pt did not have any adverse effect throughout therapy and no provocation in pain noted. Pt has been tolerated well new therapeutic exercises to help strengthening core musculature. Pt required heavy v/c's during bird dog exercises due to core weakness. Pt continues to tolerated and perform peripheral exercises routine to strengthening back and core musculature. Pt continues to work on quadriceps and hamstring flexibility. Continue skilled PT services to achieve PT and patient;'s goals.  Continue Skilled therapy session to achieve PT and patients goals. This is a 77 y.o. male referred to outpatient physical therapy and presents with a medical diagnosis of chronic lower back pain  and demonstrates limitations as described in the problem list. Pt prognosis is Fair. Pt will continue to benefit from skilled outpatient physical therapy to address the deficits listed in the problem list, provide pt/family education and to maximize pt's level of independence in the home and community environment.     Goals as follows:  Short Term Goals:  2-3 weeks (12/18/2017)   1. Report decreased in pain at worse less than  <   / =  5  /10  to increase tolerance for functional activities. (goal met)  2. Pt to increase B popliteal angle by greater than > or = 5 degrees in order to improve flexibility and posture. (goal met)   3. Increased bilateral lower extremity MMT 1/2  to increase tolerance for ADL and work activities. (goal met)  4. Pt to increase B Ely's Test by greater than  > or = 5 degrees in order to improve flexibility and posture.   5. Pt to tolerate HEP to improve ROM and independence with ADL's (goal met)  6. Pt to improve hip range of motion by 25% to allow for improved  functional mobility to allow for improvement in IADLs. (goal met)      Long Term Goals: 6-8 weeks  1. Report decreased in pain at worse less than  <   / =  0 or 1  /10  to increase tolerance for functional mobility.   2 .Pt to increase B popliteal angle by greater than > or = 10 degrees in order to improve flexibility and posture.   3. Increased bilateral lower extremity MMT 1 grade to increase tolerance for ADL and work activities.  4. Pt will report at 50% or less limitation on FOTO lumbar spine survey  to demonstrate decrease in disability and improvement in back pain.  5. Pt to be Independent with HEP to improve ROM and independence with ADL's  6. Pt to increase B Ely's Test by greater than > or = 10 degrees in order to improve flexibility and posture.   7. Pt to demonstrate negative Bridge Test in order to show improved core strength for lumbar stabilization.   8. Pt to improve  Hip range of motion by 75% to allow for improved functional mobility to allow for improvement in IADLs.      Plan     Continue with established Plan of Care towards PT goals.    Certification date: 11/27/2017 to 02/27/2017    Therapist: Travis Stover, PT  12/26/2017

## 2017-12-28 ENCOUNTER — CLINICAL SUPPORT (OUTPATIENT)
Dept: REHABILITATION | Facility: HOSPITAL | Age: 77
End: 2017-12-28
Attending: PAIN MEDICINE
Payer: MEDICARE

## 2017-12-28 DIAGNOSIS — M25.659 DECREASED RANGE OF MOTION OF HIP: ICD-10-CM

## 2017-12-28 DIAGNOSIS — M67.80 POOR FLEXIBILITY OF TENDON: ICD-10-CM

## 2017-12-28 DIAGNOSIS — R29.898 WEAKNESS OF BOTH LOWER EXTREMITIES: ICD-10-CM

## 2017-12-28 DIAGNOSIS — M62.81 WEAKNESS OF TRUNK MUSCULATURE: ICD-10-CM

## 2017-12-28 PROCEDURE — 97110 THERAPEUTIC EXERCISES: CPT | Mod: PN

## 2017-12-28 NOTE — PROGRESS NOTES
Physical Therapy Daily Note     Name: Javid Daniel  Clinic Number: [id number]  Diagnosis:   Encounter Diagnoses   Name Primary?    Weakness of trunk musculature     Poor flexibility of tendon     Decreased range of motion of hip     Weakness of both lower extremities      Physician: Yehuda Degroot Jr.,*  Precautions: Standard  Visit #: 9 of 12  LENZ:12/31/2017  Last PN 12/19/2017    last G-code: visit 1  PTA Visit #: 1    Time In: 10:00 am  Time Out: 11:00 am  Total Treatment Time: 60 minutes (1:1 with PT 30 minutes of treatment session)     Subjective     Pt reports: That he is feeling good today. Pt states he noticed pelvic tilt exercises cause pain on his back. Pt is compliant with HEP.   Pain Scale: Javid rates pain on a scale of 0-10 to be 2 currently, lumbar pain.     Objective                   Javid received individual therapeutic exercises to develop strength, endurance, ROM, flexibility and core stabilization for 55 minutes including:    Nustetp x 8 min  Upright bike 6 min   Lower trunk rotation alternating UE 3x10  DKTC using green yoga ball 3x15   Pelvic tilt 1x2 min  Brace supine marching 3x10   Supine Hamstring stretch 4x30' hold  Supine hip add ball squeeze 3x10  Bridges with ball squeeze 3x10  SL hip abd 3x10  SL hip add 3x10   Prone hip extension 3x10  Prone quadriceps stretch 4x30' hold  Prayer stretch 3x45 sec hold  Standing Marching 3x10  Standing mini squat 3x10    Bird dog 1x10  Multifidus side step 1 x 2min  Matrix trunk rotation 3x10   24#  Matrix trunk extension 3x10 24#  Matrix seated row 3x10 15#  Matrix leg press 3x10 40#   Seated ball roll out 3 direction 1x2'   Matrix leg extension 3x10 10#   Matrix knee flexion: 3x10 10#    Javid received the following manual therapy techniques: Soft tissue Mobilization were applied to the: N/a for 00 minutes including:    Written Home Exercises Provided:   Pt demo good understanding of the  education provided. Javid demonstrated good return demonstration of activities.     Education provided re:  Javid verbalized good understanding of education provided.   No spiritual or educational barriers to learning provided    Assessment     Manual tolerated treatment well today. Pt did not presented with any provocation in lower back pain. Pt tolerated well progression of weight on exercises. Pt demonstrated improvement in core musculature strength. Pt has tolerated peripheral exercises routine well with minor v/c's to perform exercises slow and control. Continue skilled PT services to achieve PT and patient;'s goals.  Continue Skilled therapy session to achieve PT and patients goals. This is a 77 y.o. male referred to outpatient physical therapy and presents with a medical diagnosis of chronic lower back pain  and demonstrates limitations as described in the problem list. Pt prognosis is Fair. Pt will continue to benefit from skilled outpatient physical therapy to address the deficits listed in the problem list, provide pt/family education and to maximize pt's level of independence in the home and community environment.     Goals as follows:  Short Term Goals:  2-3 weeks (12/18/2017)   1. Report decreased in pain at worse less than  <   / =  5  /10  to increase tolerance for functional activities. (goal met)  2. Pt to increase B popliteal angle by greater than > or = 5 degrees in order to improve flexibility and posture. (goal met)   3. Increased bilateral lower extremity MMT 1/2  to increase tolerance for ADL and work activities. (goal met)  4. Pt to increase B Ely's Test by greater than  > or = 5 degrees in order to improve flexibility and posture.   5. Pt to tolerate HEP to improve ROM and independence with ADL's (goal met)  6. Pt to improve hip range of motion by 25% to allow for improved functional mobility to allow for improvement in IADLs. (goal met)      Long Term Goals: 6-8 weeks (1/08/19)  1.  Report decreased in pain at worse less than  <   / =  0 or 1  /10  to increase tolerance for functional mobility.   2 .Pt to increase B popliteal angle by greater than > or = 10 degrees in order to improve flexibility and posture.   3. Increased bilateral lower extremity MMT 1 grade to increase tolerance for ADL and work activities.  4. Pt will report at 50% or less limitation on FOTO lumbar spine survey  to demonstrate decrease in disability and improvement in back pain.  5. Pt to be Independent with HEP to improve ROM and independence with ADL's  6. Pt to increase B Ely's Test by greater than > or = 10 degrees in order to improve flexibility and posture.   7. Pt to demonstrate negative Bridge Test in order to show improved core strength for lumbar stabilization.   8. Pt to improve  Hip range of motion by 75% to allow for improved functional mobility to allow for improvement in IADLs.      Plan   PT/PTA will perform FOTO outcome assessment next visit.     Continue with established Plan of Care towards PT goals.    Certification date: 11/27/2017 to 02/27/2017    Therapist: Travis Stover, PT  12/28/2017

## 2018-01-09 ENCOUNTER — CLINICAL SUPPORT (OUTPATIENT)
Dept: REHABILITATION | Facility: HOSPITAL | Age: 78
End: 2018-01-09
Attending: PAIN MEDICINE
Payer: MEDICARE

## 2018-01-09 DIAGNOSIS — M25.659 DECREASED RANGE OF MOTION OF HIP: ICD-10-CM

## 2018-01-09 DIAGNOSIS — M67.80 POOR FLEXIBILITY OF TENDON: ICD-10-CM

## 2018-01-09 DIAGNOSIS — R29.898 WEAKNESS OF BOTH LOWER EXTREMITIES: ICD-10-CM

## 2018-01-09 DIAGNOSIS — M62.81 WEAKNESS OF TRUNK MUSCULATURE: Primary | ICD-10-CM

## 2018-01-09 PROCEDURE — G8979 MOBILITY GOAL STATUS: HCPCS | Mod: CK,PN

## 2018-01-09 PROCEDURE — 97110 THERAPEUTIC EXERCISES: CPT | Mod: PN

## 2018-01-09 PROCEDURE — G8978 MOBILITY CURRENT STATUS: HCPCS | Mod: CK,PN

## 2018-01-09 NOTE — PROGRESS NOTES
Physical Therapy Daily Note     Name: Javid Daniel  Clinic Number: [id number]  Diagnosis:   Encounter Diagnoses   Name Primary?    Weakness of trunk musculature Yes    Poor flexibility of tendon     Decreased range of motion of hip     Weakness of both lower extremities      Physician: Yehuda Degroot Jr.,*  Precautions: Standard  Visit #: 10 of 12  LENZ:12/31/2017  Last PN 12/19/2017    last G-code: visit 10  PTA Visit #: 1    Time In: 1030  Time Out: 1125  Total Treatment Time: 55 minutes (1:1 with PTA 30 minutes of treatment session)     Subjective     Pt reports: Javid states he is feeling pretty good today.   Pain Scale: Javid rates pain on a scale of 0-10 to be 2 currently, lumbar pain.     Objective      Javid received individual therapeutic exercises to develop strength, endurance, ROM, flexibility and core stabilization for 55 minutes including:    Nustetp x 8 min  Upright bike x 7 min  Lower trunk rotation alternating UE 3x10  DKTC using green yoga ball 3x15   Pelvic tilt 1x2 min  Brace supine marching 3x10   Supine Hamstring stretch 4x30' hold  Supine hip add ball squeeze 3x10  Bridges with ball squeeze 3x10  SL hip abd 3x10  SL hip add 3x10   Prone hip extension 3x10  Prone quadriceps stretch 4x30' hold  Prayer stretch 3x45 sec hold  Standing Marching 3x10  Standing mini squat 3x10    Bird dog 1x10  Multifidus side step 1 x 2min  Matrix trunk rotation 3x10   24#  Matrix trunk extension 3x10 24#   Matrix seated row 3x10 20#  Matrix leg press 3x10 40#   Seated ball roll out 3 direction 1x2'   Matrix leg extension 3x10 20#   Matrix knee flexion: 3x10 20#    Javid received the following manual therapy techniques: Soft tissue Mobilization were applied to the: N/a for 00 minutes including:    Written Home Exercises Provided: Pt demo good understanding of the education provided. Javid demonstrated good return demonstration of activities.      Education provided re:  Javid verbalized good understanding of education provided.   No spiritual or educational barriers to learning provided    Assessment     Manual tolerated treatment well today. Patient required cueing initially to promote gluteal activation with bridging with very good correction observed. Patient with good tolerance to progression of exercise resistance today with appropriate training effect and muscle fatigue achieved. Patent able to complete all exercises without complaints of lower back pain. Continue skilled PT services to achieve PT and patient's goals.  Continue Skilled therapy session to achieve PT and patients goals. This is a 77 y.o. male referred to outpatient physical therapy and presents with a medical diagnosis of chronic lower back pain  and demonstrates limitations as described in the problem list. Pt prognosis is Fair. Pt will continue to benefit from skilled outpatient physical therapy to address the deficits listed in the problem list, provide pt/family education and to maximize pt's level of independence in the home and community environment.     Goals as follows:  Short Term Goals:  2-3 weeks (12/18/2017)   1. Report decreased in pain at worse less than  <   / =  5  /10  to increase tolerance for functional activities. (goal met)  2. Pt to increase B popliteal angle by greater than > or = 5 degrees in order to improve flexibility and posture. (goal met)   3. Increased bilateral lower extremity MMT 1/2  to increase tolerance for ADL and work activities. (goal met)  4. Pt to increase B Ely's Test by greater than  > or = 5 degrees in order to improve flexibility and posture.   5. Pt to tolerate HEP to improve ROM and independence with ADL's (goal met)  6. Pt to improve hip range of motion by 25% to allow for improved functional mobility to allow for improvement in IADLs. (goal met)      Long Term Goals: 6-8 weeks (1/08/19)  1. Report decreased in pain at worse less than   <   / =  0 or 1  /10  to increase tolerance for functional mobility.   2 .Pt to increase B popliteal angle by greater than > or = 10 degrees in order to improve flexibility and posture.   3. Increased bilateral lower extremity MMT 1 grade to increase tolerance for ADL and work activities.  4. Pt will report at 50% or less limitation on FOTO lumbar spine survey  to demonstrate decrease in disability and improvement in back pain.  5. Pt to be Independent with HEP to improve ROM and independence with ADL's  6. Pt to increase B Ely's Test by greater than > or = 10 degrees in order to improve flexibility and posture.   7. Pt to demonstrate negative Bridge Test in order to show improved core strength for lumbar stabilization.   8. Pt to improve  Hip range of motion by 75% to allow for improved functional mobility to allow for improvement in IADLs.      Plan     Continue with established Plan of Care towards PT goals.    Certification date: 11/27/2017 to 02/27/2017    Therapist: Mary Ann Trinh PTA & Travis Ceballos PT, DPT  1/9/2018

## 2018-01-16 ENCOUNTER — CLINICAL SUPPORT (OUTPATIENT)
Dept: REHABILITATION | Facility: HOSPITAL | Age: 78
End: 2018-01-16
Attending: PAIN MEDICINE
Payer: MEDICARE

## 2018-01-16 DIAGNOSIS — M25.659 DECREASED RANGE OF MOTION OF HIP: ICD-10-CM

## 2018-01-16 DIAGNOSIS — M67.80 POOR FLEXIBILITY OF TENDON: ICD-10-CM

## 2018-01-16 DIAGNOSIS — R29.898 WEAKNESS OF BOTH LOWER EXTREMITIES: ICD-10-CM

## 2018-01-16 DIAGNOSIS — M62.81 WEAKNESS OF TRUNK MUSCULATURE: Primary | ICD-10-CM

## 2018-01-16 PROCEDURE — 97110 THERAPEUTIC EXERCISES: CPT | Mod: PN

## 2018-01-16 NOTE — PROGRESS NOTES
Physical Therapy Daily Note     Name: Javid Daniel  Clinic Number: [id number]  Diagnosis:   Encounter Diagnoses   Name Primary?    Weakness of trunk musculature Yes    Poor flexibility of tendon     Decreased range of motion of hip     Weakness of both lower extremities      Physician: Yehuda Degroot Jr.,*  Precautions: Standard  Visit #: 2 of 20   Total visits: 11---Last PN 12/19/2017,  last G-code: visit 10    PTA Visit #: 2    Time In: 0950  Time Out: 1050  Total Treatment Time: 60 minutes (1:1 with PTA duration of treatment session)     Subjective     Pt reports: Javid states he is feeling pretty good today. Patient states his lower back is only a little painful and he is really feeling better with therapy.   Pain Scale: Javid rates pain on a scale of 0-10 to be 2 currently, lumbar pain.     Objective      Javid received individual therapeutic exercises to develop strength, endurance, ROM, flexibility and core stabilization for 55 minutes including:    Nustetp x 8 min  Upright bike x 7 min  Lower trunk rotation alternating UE 3x10  DKTC using green yoga ball 3x15   Pelvic tilt 1x2 min  Brace supine marching 3x10   Supine Hamstring stretch 4x30' hold  Supine hip add ball squeeze 3x10  Bridges with ball squeeze 3x10  Standing Marching 3x10  Standing mini squat 3x10    +BLE shuttle squats 3x10 3 black cords  Multifidus side step 1 x 2min (GTB)  Matrix trunk rotation 3x10   28#  Matrix trunk extension 3x10 28#   Matrix seated row 3x10 20#  Matrix leg press 3x10 45#   Seated ball roll out 3 direction 1x2'   Matrix leg extension 3x10 20#   Matrix knee flexion: 3x10 20#  +Matrix hip abduction 3x10 30#  +Matrix hip adduction 3x10 30#     Javid received the following manual therapy techniques: Soft tissue Mobilization were applied to the: N/a for 00 minutes including:    Written Home Exercises Provided: Pt demo good understanding of the education provided.  Javid demonstrated good return demonstration of activities.     Education provided re:  Javid verbalized good understanding of education provided.   No spiritual or educational barriers to learning provided    Assessment     Javid tolerated treatment well today. Patient able to progress to BLE shuttle squats today with tactile and verbal cues needed for valgus collapse awareness and correction. Patient demonstrates good tolerance to hip strengthening exercises on weight machines with appropriate training effect easily achieved. Patent able to complete all exercises without complaints of lower back pain. Continue skilled PT services to achieve PT and patient's goals.  Continue Skilled therapy session to achieve PT and patients goals. This is a 77 y.o. male referred to outpatient physical therapy and presents with a medical diagnosis of chronic lower back pain  and demonstrates limitations as described in the problem list. Pt prognosis is Fair. Pt will continue to benefit from skilled outpatient physical therapy to address the deficits listed in the problem list, provide pt/family education and to maximize pt's level of independence in the home and community environment.     Goals as follows:  Short Term Goals:  2-3 weeks (12/18/2017)   1. Report decreased in pain at worse less than  <   / =  5  /10  to increase tolerance for functional activities. (goal met)  2. Pt to increase B popliteal angle by greater than > or = 5 degrees in order to improve flexibility and posture. (goal met)   3. Increased bilateral lower extremity MMT 1/2  to increase tolerance for ADL and work activities. (goal met)  4. Pt to increase B Ely's Test by greater than  > or = 5 degrees in order to improve flexibility and posture.   5. Pt to tolerate HEP to improve ROM and independence with ADL's (goal met)  6. Pt to improve hip range of motion by 25% to allow for improved functional mobility to allow for improvement in IADLs. (goal met)       Long Term Goals: 6-8 weeks (1/08/19)  1. Report decreased in pain at worse less than  <   / =  0 or 1  /10  to increase tolerance for functional mobility.   2 .Pt to increase B popliteal angle by greater than > or = 10 degrees in order to improve flexibility and posture.   3. Increased bilateral lower extremity MMT 1 grade to increase tolerance for ADL and work activities.  4. Pt will report at 50% or less limitation on FOTO lumbar spine survey  to demonstrate decrease in disability and improvement in back pain.  5. Pt to be Independent with HEP to improve ROM and independence with ADL's  6. Pt to increase B Ely's Test by greater than > or = 10 degrees in order to improve flexibility and posture.   7. Pt to demonstrate negative Bridge Test in order to show improved core strength for lumbar stabilization.   8. Pt to improve  Hip range of motion by 75% to allow for improved functional mobility to allow for improvement in IADLs.      Plan     Continue with established Plan of Care towards PT goals.    Certification date: 11/27/2017 to 02/27/2017    Therapist: Mary Ann Trinh PTA & Travis Ceballos PT, DPT  1/16/2018

## 2018-01-18 ENCOUNTER — CLINICAL SUPPORT (OUTPATIENT)
Dept: REHABILITATION | Facility: HOSPITAL | Age: 78
End: 2018-01-18
Attending: PAIN MEDICINE
Payer: MEDICARE

## 2018-01-18 DIAGNOSIS — R29.898 WEAKNESS OF BOTH LOWER EXTREMITIES: ICD-10-CM

## 2018-01-18 DIAGNOSIS — M25.659 DECREASED RANGE OF MOTION OF HIP: ICD-10-CM

## 2018-01-18 DIAGNOSIS — M62.81 WEAKNESS OF TRUNK MUSCULATURE: ICD-10-CM

## 2018-01-18 DIAGNOSIS — M67.80 POOR FLEXIBILITY OF TENDON: ICD-10-CM

## 2018-01-18 PROCEDURE — 97110 THERAPEUTIC EXERCISES: CPT | Mod: PN

## 2018-01-18 NOTE — PROGRESS NOTES
Physical Therapy Daily Note     Name: Javid Daniel  Clinic Number: [id number]  Diagnosis:   Encounter Diagnoses   Name Primary?    Weakness of trunk musculature     Poor flexibility of tendon     Decreased range of motion of hip     Weakness of both lower extremities      Physician: Yehuda Degroot Jr.,*  Precautions: Standard  Visit #: 3 of 20   Total visits: 11---Last PN 1/18/2018,  last G-code: visit 10    PTA Visit #: 2    Time In: 10:00 am  Time Out: 11:00 am  Total Treatment Time: 60 minutes (1:1 with PT duration of treatment session)     Subjective     Pt reports: Javid states he is feeling well. Pt denies lower back pain today. Pt states therapy is helping decrease lower back pain.   Pain Scale: Javid rates pain on a scale of 0-10 to be 2 currently, lumbar pain.     Objective   CMS Impairment/Limitation/Restriction for FOTO Lumbar Spine Survey  Status Limitation G-Code CMS Severity Modifier  Intake 32% 68%  Predicted 50% 50% Goal Status+ CK - At least 40 percent but less than 60 percent  12/19/2017 53% 47%  1/9/2018 57% 43% Current Status CK - At least 40 percent but less than 60 percent  D/C Status CK **only report if this is discharge survey  +Based on FOTO predicted change score     Passive hip ROM in degrees:       Left Right   IR 42 40   ER 32 36      Lower Extremity Strength     Right LE   Left LE     Hip flexion: 4/5 Hip flexion: 4/5   Knee extension: 4+/5 Knee extension: 4+/5   Knee flexion: 4+/5 Knee flexion: 4+/5   Hip IR: 4+/5 Hip IR: 4+/5   Hip ER: 4+/5 Hip ER: 4+/5   Hip extension:  4/5 Hip extension: 4/5   Hip abduction: 4+/5 Hip abduction: 4+/5   Hip adduction: 4+/5 Hip adduction 4+/5   Ankle dorsiflexion: 4+/5 Ankle dorsiflexion: 4+/5   Ankle plantarflexion: 4+/5 Ankle plantarflexion: 4+/5         Flexibility:               Ely's test: R = 126 degrees ; L = 127 degrees              Popliteal Angle: R = 170 degrees ; L =  180 degrees     Javid received individual therapeutic exercises to develop strength, endurance, ROM, flexibility and core stabilization for 53 minutes including:  Treadmill 5 min  Nustetp x 8 min  Upright bike x 7 min  Lower trunk rotation alternating UE 3x10  DKTC using green yoga ball 3x15   Pelvic tilt 1x2 min  Brace supine marching 3x10   Supine Hamstring stretch 4x30' hold  Supine hip add ball squeeze 3x10  Bridges with ball squeeze 3x10  Standing Marching 3x10  Standing mini squat 3x10    +BLE shuttle squats 3x10 3 black cords  Multifidus side step 1 x 2min (GTB)  Matrix trunk rotation 3x10   28#  Matrix trunk extension 3x10 28#   Matrix seated row 3x10 20#  Matrix leg press 3x10 45#   Seated ball roll out 3 direction 1x2'   Matrix leg extension 3x10 20#   Matrix knee flexion: 3x10 20#  +Matrix hip abduction 3x10 30#  +Matrix hip adduction 3x10 30#     Javid received the following manual therapy techniques: Soft tissue Mobilization were applied to the: N/a for 00 minutes including:    Written Home Exercises Provided: Pt demo good understanding of the education provided. Javid demonstrated good return demonstration of activities.     Education provided re:  Javid verbalized good understanding of education provided.   No spiritual or educational barriers to learning provided    Assessment     Javid tolerated treatment well today. Pt progress very well towards therapeutic exercises. Pt demonstrated to perform all exercises with minor v/c's for proper form. Peripheral strength exercises were tolerated well with muscle fatigue noted. Pt demonstrated improvement in lower extremity muscle strength, hip range of motion, and hamstring and quadriceps flexibility. FOTO Lumbar spine survey has demonstrated improvement since initial evaluation. Pt has met all STG's and most LTG's today. Pt continues to improve towards discharge goals.   PT/PTA face to face conference performed during today's treatment session.  Patient's goals and POC updated today.   Continue Skilled therapy session to achieve PT and patients goals. This is a 77 y.o. male referred to outpatient physical therapy and presents with a medical diagnosis of chronic lower back pain  and demonstrates limitations as described in the problem list. Pt prognosis is Fair. Pt will continue to benefit from skilled outpatient physical therapy to address the deficits listed in the problem list, provide pt/family education and to maximize pt's level of independence in the home and community environment.     Goals as follows:  Short Term Goals:  2-3 weeks (12/18/2017)   1. Report decreased in pain at worse less than  <   / =  5  /10  to increase tolerance for functional activities. (goal met)  2. Pt to increase B popliteal angle by greater than > or = 5 degrees in order to improve flexibility and posture. (goal met)   3. Increased bilateral lower extremity MMT 1/2  to increase tolerance for ADL and work activities. (goal met)  4. Pt to increase B Ely's Test by greater than  > or = 5 degrees in order to improve flexibility and posture.   5. Pt to tolerate HEP to improve ROM and independence with ADL's (goal met)  6. Pt to improve hip range of motion by 25% to allow for improved functional mobility to allow for improvement in IADLs. (goal met)      Long Term Goals: 6-8 weeks (1/18/18) updated  1. Report decreased in pain at worse less than  <   / =  0 or 1  /10  to increase tolerance for functional mobility. (goal met)  2 .Pt to increase B popliteal angle by greater than > or = 10 degrees in order to improve flexibility and posture. (goal met)   3. Increased bilateral lower extremity MMT 1 grade to increase tolerance for ADL and work activities.(goal partial met)  4. Pt will report at 50% or less limitation on FOTO lumbar spine survey  to demonstrate decrease in disability and improvement in back pain.(goal met)  5. Pt to be Independent with HEP to improve ROM and  independence with ADL's (goal met)  6. Pt to increase B Ely's Test by greater than > or = 10 degrees in order to improve flexibility and posture. (goal met)  7. Pt to demonstrate negative Bridge Test in order to show improved core strength for lumbar stabilization. (goal met)  8. Pt to improve  Hip range of motion by 75% to allow for improved functional mobility to allow for improvement in IADLs. (goal in progress )     Plan     Continue with established Plan of Care towards PT goals.    Certification date: 11/27/2017 to 02/27/2017    Therapist: Travis Stover PT & Mary Ann Trinh   1/18/2018

## 2018-01-23 ENCOUNTER — CLINICAL SUPPORT (OUTPATIENT)
Dept: REHABILITATION | Facility: HOSPITAL | Age: 78
End: 2018-01-23
Attending: PAIN MEDICINE
Payer: MEDICARE

## 2018-01-23 DIAGNOSIS — M25.659 DECREASED RANGE OF MOTION OF HIP: ICD-10-CM

## 2018-01-23 DIAGNOSIS — M67.80 POOR FLEXIBILITY OF TENDON: ICD-10-CM

## 2018-01-23 DIAGNOSIS — M62.81 WEAKNESS OF TRUNK MUSCULATURE: ICD-10-CM

## 2018-01-23 DIAGNOSIS — R29.898 WEAKNESS OF BOTH LOWER EXTREMITIES: ICD-10-CM

## 2018-01-23 PROCEDURE — 97110 THERAPEUTIC EXERCISES: CPT | Mod: PN

## 2018-01-23 NOTE — PROGRESS NOTES
"                                                    Physical Therapy Daily Note     Name: Javid Daniel  Clinic Number: [id number]  Diagnosis:   Encounter Diagnoses   Name Primary?    Weakness of trunk musculature     Poor flexibility of tendon     Decreased range of motion of hip     Weakness of both lower extremities      Physician: Yehuda Degroot Jr.,*  Precautions: Standard  Visit #: 4 of 20   Total visits: 12---Last PN 1/18/2018,  last G-code: visit 10    PTA Visit #: 1    Time In: 0955  Time Out: 1055  Total Treatment Time: 60 minutes (1:1 with PTA 30 minutes of treatment session)     Subjective     Pt reports: Javid states his back is only minimally sore today. Patient states he is wearing his "back brace" for support because he likes to wear it when he is outside.   Pain Scale: Javid rates pain on a scale of 0-10 to be 2 currently, lumbar pain.     Objective     Bold denotes exercises performed:      Javid received individual therapeutic exercises to develop strength, endurance, ROM, flexibility and core stabilization for 53 minutes including:    Treadmill x 5 min  Nustetp x 8 min  Upright bike x 7 minutes   Lower trunk rotation alternating UE 3x10  DKTC using green yoga ball 3x15   Pelvic tilt 1x2 min  Brace supine marching 3x10   Supine Hamstring stretch 4x30' hold  Supine hip add ball squeeze 3x10  Bridges with ball squeeze 3x10  Standing Marching 3x10  Standing mini squat 3x10    BLE shuttle squats 3x10 3 black cords  Multifidus side step 1 x 2min (GTB)  Matrix trunk rotation 3x10   28#  Matrix trunk extension 3x10 28#   Matrix seated row 3x10 25#  Matrix leg press 3x10 45#   Seated ball roll out 3 direction 1x2'   Matrix leg extension 3x10 20#   Matrix knee flexion: 3x10 20#  Matrix hip abduction 3x10 30#  Matrix hip adduction 3x10 30#     Javid received the following manual therapy techniques: Soft tissue Mobilization were applied to the: N/a for 00 minutes including:    Written Home " Exercises Provided: Pt demo good understanding of the education provided. Javid demonstrated good return demonstration of activities.     Education provided re:  Javid verbalized good understanding of education provided.   No spiritual or educational barriers to learning provided    Assessment     Javid tolerated treatment well today. Patient required cueing today to ensure core activation when performing hip and core stabilization in order to protect lumbar spine from shear forces. Patient continues to demonstrate excellent tolerance to strengthening focused therapeutic exercise with appropriate training effect and muscle fatigue achieved. Patient able to perform all exercises without complaints of increased lower back pain. Pt continues to improve towards discharge goals.     Continue Skilled therapy session to achieve PT and patients goals. This is a 77 y.o. male referred to outpatient physical therapy and presents with a medical diagnosis of chronic lower back pain  and demonstrates limitations as described in the problem list. Pt prognosis is Fair. Pt will continue to benefit from skilled outpatient physical therapy to address the deficits listed in the problem list, provide pt/family education and to maximize pt's level of independence in the home and community environment.     Goals as follows:  Short Term Goals:  2-3 weeks (12/18/2017)   1. Report decreased in pain at worse less than  <   / =  5  /10  to increase tolerance for functional activities. (goal met)  2. Pt to increase B popliteal angle by greater than > or = 5 degrees in order to improve flexibility and posture. (goal met)   3. Increased bilateral lower extremity MMT 1/2  to increase tolerance for ADL and work activities. (goal met)  4. Pt to increase B Ely's Test by greater than  > or = 5 degrees in order to improve flexibility and posture.   5. Pt to tolerate HEP to improve ROM and independence with ADL's (goal met)  6. Pt to improve hip  range of motion by 25% to allow for improved functional mobility to allow for improvement in IADLs. (goal met)      Long Term Goals: 6-8 weeks (1/18/18) updated  1. Report decreased in pain at worse less than  <   / =  0 or 1  /10  to increase tolerance for functional mobility. (goal met)  2 .Pt to increase B popliteal angle by greater than > or = 10 degrees in order to improve flexibility and posture. (goal met)   3. Increased bilateral lower extremity MMT 1 grade to increase tolerance for ADL and work activities.(goal partial met)  4. Pt will report at 50% or less limitation on FOTO lumbar spine survey  to demonstrate decrease in disability and improvement in back pain.(goal met)  5. Pt to be Independent with HEP to improve ROM and independence with ADL's (goal met)  6. Pt to increase B Ely's Test by greater than > or = 10 degrees in order to improve flexibility and posture. (goal met)  7. Pt to demonstrate negative Bridge Test in order to show improved core strength for lumbar stabilization. (goal met)  8. Pt to improve  Hip range of motion by 75% to allow for improved functional mobility to allow for improvement in IADLs. (goal in progress )     Plan     Continue with established Plan of Care towards PT goals.    Certification date: 11/27/2017 to 02/27/2017    Therapist: Mary Ann Trinh, PTA   1/23/2018

## 2018-01-25 ENCOUNTER — CLINICAL SUPPORT (OUTPATIENT)
Dept: REHABILITATION | Facility: HOSPITAL | Age: 78
End: 2018-01-25
Attending: PAIN MEDICINE
Payer: MEDICARE

## 2018-01-25 DIAGNOSIS — M67.80 POOR FLEXIBILITY OF TENDON: ICD-10-CM

## 2018-01-25 DIAGNOSIS — M62.81 WEAKNESS OF TRUNK MUSCULATURE: ICD-10-CM

## 2018-01-25 DIAGNOSIS — R29.898 WEAKNESS OF BOTH LOWER EXTREMITIES: ICD-10-CM

## 2018-01-25 DIAGNOSIS — M25.659 DECREASED RANGE OF MOTION OF HIP: ICD-10-CM

## 2018-01-25 PROCEDURE — G8980 MOBILITY D/C STATUS: HCPCS | Mod: CK,PN

## 2018-01-25 PROCEDURE — 97110 THERAPEUTIC EXERCISES: CPT | Mod: PN

## 2018-01-25 PROCEDURE — G8979 MOBILITY GOAL STATUS: HCPCS | Mod: CK,PN

## 2018-01-25 NOTE — PROGRESS NOTES
Physical Therapy Daily Note     Name: Javid Daniel  Clinic Number: [id number]  Diagnosis:   Encounter Diagnoses   Name Primary?    Weakness of trunk musculature     Poor flexibility of tendon     Decreased range of motion of hip     Weakness of both lower extremities      Physician: Yehuda Degroot Jr.,*  Precautions: Standard  Visit #: 5 of 20   Total visits: 12---Last PN 1/18/2018,  last G-code: visit 10    PTA Visit #: 1    Time In: 9:55  Time Out: 1055  Total Treatment Time: 60 minutes (1:1 with PT 60 minutes of treatment session)     Subjective     Pt reports: Javid denies any back pain. Pt wears back support brace outside the house.     Objective   CMS Impairment/Limitation/Restriction for FOTO Lumbar Spine Survey  Status Limitation G-Code CMS Severity Modifier  Intake 32% 68%  Predicted 50% 50% Goal Status+ CK - At least 40 percent but less than 60 percent  12/19/2017 53% 47%  1/9/2018 57% 43% Current Status CK - At least 40 percent but less than 60 percent  D/C Status CK **only report if this is discharge survey  +Based on FOTO predicted change score     Passive hip ROM in degrees:       Left Right   IR 42 40   ER 32 36      Lower Extremity Strength     Right LE   Left LE     Hip flexion: 4/5 Hip flexion: 4/5   Knee extension: 4+/5 Knee extension: 4+/5   Knee flexion: 4+/5 Knee flexion: 4+/5   Hip IR: 4+/5 Hip IR: 4+/5   Hip ER: 4+/5 Hip ER: 4+/5   Hip extension:  4/5 Hip extension: 4/5   Hip abduction: 4+/5 Hip abduction: 4+/5   Hip adduction: 4+/5 Hip adduction 4+/5   Ankle dorsiflexion: 4+/5 Ankle dorsiflexion: 4+/5   Ankle plantarflexion: 4+/5 Ankle plantarflexion: 4+/5         Flexibility:               Ely's test: R = 126 degrees ; L = 127 degrees              Popliteal Angle: R = 170 degrees ; L = 180 degrees    Bold denotes exercises performed:      Javid received individual therapeutic exercises to develop strength, endurance, ROM,  flexibility and core stabilization for 53 minutes including:    Treadmill x 5 min  Nustetp x 8 min  Upright bike x 7 minutes   Lower trunk rotation alternating UE 3x10  DKTC using green yoga ball 3x15   Pelvic tilt 1x2 min  Brace supine marching 3x10   Supine Hamstring stretch 4x30' hold  Supine hip add ball squeeze 3x10  Bridges with ball squeeze 3x10  Standing Marching 3x10  Standing mini squat 3x10    BLE shuttle squats 3x10 3 black cords  Multifidus side step 1 x 2min (GTB)  Matrix trunk rotation 3x10   28#  Matrix trunk extension 3x10 28#   Matrix seated row 3x10 25#  Matrix leg press 3x10 45#   Seated ball roll out 3 direction 1x2'   Matrix leg extension 3x10 20#   Matrix knee flexion: 3x10 20#  Matrix hip abduction 3x10 30#  Matrix hip adduction 3x10 30#     Javid received the following manual therapy techniques: Soft tissue Mobilization were applied to the: N/a for 00 minutes including:    Written Home Exercises Provided: Pt demo good understanding of the education provided. Javid demonstrated good return demonstration of activities. HEP was given prior d/c.     Education provided re:  Javid verbalized good understanding of education provided.   No spiritual or educational barriers to learning provided    Assessment     Javid tolerated treatment well today. Pt did not presented with any lower back pain during therapeutic exercises. Pt continues to progress core and lower extremity strengthening. Pt demonstrated good core and hip stabilization  rhythm with minor v/c's. Pt has achieve most of LTG's prior discharge. FOTO lumbar survey demonstrated improvement since initial evaluation and goals was met. Pt is ready to continues exercises at home independently. HEP was given today. Pt will be discharge today.  Continue Skilled therapy session to achieve PT and patients goals. This is a 77 y.o. male referred to outpatient physical therapy and presents with a medical diagnosis of chronic lower back pain  and  demonstrates limitations as described in the problem list. Pt prognosis is Fair. Pt will continue to benefit from skilled outpatient physical therapy to address the deficits listed in the problem list, provide pt/family education and to maximize pt's level of independence in the home and community environment.     Goals as follows:  Short Term Goals:  2-3 weeks (12/18/2017)   1. Report decreased in pain at worse less than  <   / =  5  /10  to increase tolerance for functional activities. (goal met)  2. Pt to increase B popliteal angle by greater than > or = 5 degrees in order to improve flexibility and posture. (goal met)   3. Increased bilateral lower extremity MMT 1/2  to increase tolerance for ADL and work activities. (goal met)  4. Pt to increase B Ely's Test by greater than  > or = 5 degrees in order to improve flexibility and posture.   5. Pt to tolerate HEP to improve ROM and independence with ADL's (goal met)  6. Pt to improve hip range of motion by 25% to allow for improved functional mobility to allow for improvement in IADLs. (goal met)      Long Term Goals: 6-8 weeks (1/18/18) updated  1. Report decreased in pain at worse less than  <   / =  0 or 1  /10  to increase tolerance for functional mobility. (goal met)  2 .Pt to increase B popliteal angle by greater than > or = 10 degrees in order to improve flexibility and posture. (goal met)   3. Increased bilateral lower extremity MMT 1 grade to increase tolerance for ADL and work activities.(goal partial met)  4. Pt will report at 50% or less limitation on FOTO lumbar spine survey  to demonstrate decrease in disability and improvement in back pain.(goal met)  5. Pt to be Independent with HEP to improve ROM and independence with ADL's (goal met)  6. Pt to increase B Ely's Test by greater than > or = 10 degrees in order to improve flexibility and posture. (goal met)  7. Pt to demonstrate negative Bridge Test in order to show improved core strength for lumbar  stabilization. (goal met)  8. Pt to improve  Hip range of motion by 75% to allow for improved functional mobility to allow for improvement in IADLs. (goal in progress )     Plan   Pt will be discharge today    Therapist: Travis Stover, PT   1/25/2018

## 2019-12-03 LAB
ALBUMIN/GLOB SERPL ELPH: 2.3 {RATIO}
ALBUMIN: 4.8
ALP ISOS SERPL LEV INH-CCNC: 50 U/L
ALT: 10
AST: 20
BILIRUBIN TOTAL: 0.9
BUN BLD-MCNC: 15 MG/DL (ref 4–21)
BUN/CREAT SERPL: 16
CALCIUM SERPL-MCNC: 9.6 MG/DL
CARBON DIOXIDE, CO2: 27
CHLORIDE: 97
CHOLEST SERPL-MSCNC: 154 MG/DL (ref 0–200)
CREAT SERPL-MCNC: 0.9 MG/DL
EGFR IF AFRICAN AMERICAN: 81
ERYTHROCYTE [DISTWIDTH] IN BLOOD BY AUTOMATED COUNT: 14.2 %
EST. GFR  (NON AFRICAN AMERICAN): 94 MG/DL
GLOBULIN SER CALC-MCNC: 2.1 G/L
GLUCOSE: 96
HBA1C MFR BLD: 5.6 %
HCT VFR BLD AUTO: 41 % (ref 41–53)
HDLC SERPL-MCNC: 53 MG/DL
HGB BLD-MCNC: 13.2 G/DL (ref 13.5–17.5)
LDLC SERPL CALC-MCNC: 78 MG/DL
Lab: 6.9
MCH RBC QN AUTO: 30.6 PG
MCHC RBC AUTO-ENTMCNC: 32.3 G/DL
MCV RBC AUTO: 95 FL
PLATELETS: 188
POTASSIUM: 4.4
PSA FREE SERPL-MCNC: 1 NG/ML
RBC # BLD AUTO: 4.32 10*6/UL
SODIUM: 137
TRIGLYCERIDE (LIPID PAN): 117
TSH: 2.72
VLDL CHOLESTEROL: 23 MG/DL
WBC: 4.8

## 2020-01-21 ENCOUNTER — TELEPHONE (OUTPATIENT)
Dept: FAMILY MEDICINE | Facility: CLINIC | Age: 80
End: 2020-01-21

## 2020-01-21 NOTE — TELEPHONE ENCOUNTER
Rt pt daughter call and it went straight to  . Pt has a apt with Dr. Mcintosh scheduled on 1/23/20 at 2:40 pm

## 2020-01-21 NOTE — TELEPHONE ENCOUNTER
----- Message from Adamsheree WenAni sent at 1/21/2020  8:35 AM CST -----  Contact: Gerda  Type: Patient Call Back    Who called: Gerda    What is the request in detail: patient exposed to Flu and was told by Dr. Mcintosh to call for appt today so he's within the 48hrs time frame.    Can the clinic reply by [facility]? No     Would the patient rather a call back or a response via [facility]? Call     Best call back number: [phone]

## 2020-01-23 ENCOUNTER — TELEPHONE (OUTPATIENT)
Dept: FAMILY MEDICINE | Facility: CLINIC | Age: 80
End: 2020-01-23

## 2020-01-23 ENCOUNTER — OFFICE VISIT (OUTPATIENT)
Dept: FAMILY MEDICINE | Facility: CLINIC | Age: 80
End: 2020-01-23
Payer: MEDICARE

## 2020-01-23 VITALS
BODY MASS INDEX: 28.77 KG/M2 | DIASTOLIC BLOOD PRESSURE: 71 MMHG | HEART RATE: 68 BPM | TEMPERATURE: 98 F | HEIGHT: 69 IN | SYSTOLIC BLOOD PRESSURE: 151 MMHG | WEIGHT: 194.25 LBS | OXYGEN SATURATION: 99 %

## 2020-01-23 DIAGNOSIS — G47.00 INSOMNIA, UNSPECIFIED TYPE: ICD-10-CM

## 2020-01-23 DIAGNOSIS — G89.29 CHRONIC MIDLINE LOW BACK PAIN WITHOUT SCIATICA: ICD-10-CM

## 2020-01-23 DIAGNOSIS — M54.50 CHRONIC MIDLINE LOW BACK PAIN WITHOUT SCIATICA: ICD-10-CM

## 2020-01-23 DIAGNOSIS — K21.9 GASTROESOPHAGEAL REFLUX DISEASE, ESOPHAGITIS PRESENCE NOT SPECIFIED: ICD-10-CM

## 2020-01-23 DIAGNOSIS — I10 HYPERTENSION, BENIGN: Primary | ICD-10-CM

## 2020-01-23 DIAGNOSIS — F41.9 ANXIETY: ICD-10-CM

## 2020-01-23 DIAGNOSIS — Z20.828 EXPOSURE TO THE FLU: ICD-10-CM

## 2020-01-23 PROBLEM — N13.8 BPH WITH OBSTRUCTION/LOWER URINARY TRACT SYMPTOMS: Status: ACTIVE | Noted: 2019-12-06

## 2020-01-23 PROBLEM — N40.1 BPH WITH OBSTRUCTION/LOWER URINARY TRACT SYMPTOMS: Status: ACTIVE | Noted: 2019-12-06

## 2020-01-23 PROCEDURE — 1159F PR MEDICATION LIST DOCUMENTED IN MEDICAL RECORD: ICD-10-PCS | Mod: ,,, | Performed by: FAMILY MEDICINE

## 2020-01-23 PROCEDURE — 1126F PR PAIN SEVERITY QUANTIFIED, NO PAIN PRESENT: ICD-10-PCS | Mod: ,,, | Performed by: FAMILY MEDICINE

## 2020-01-23 PROCEDURE — 99999 PR PBB SHADOW E&M-EST. PATIENT-LVL III: ICD-10-PCS | Mod: PBBFAC,,, | Performed by: FAMILY MEDICINE

## 2020-01-23 PROCEDURE — 1159F MED LIST DOCD IN RCRD: CPT | Mod: ,,, | Performed by: FAMILY MEDICINE

## 2020-01-23 PROCEDURE — 1126F AMNT PAIN NOTED NONE PRSNT: CPT | Mod: ,,, | Performed by: FAMILY MEDICINE

## 2020-01-23 PROCEDURE — 99204 PR OFFICE/OUTPT VISIT, NEW, LEVL IV, 45-59 MIN: ICD-10-PCS | Mod: S$PBB,,, | Performed by: FAMILY MEDICINE

## 2020-01-23 PROCEDURE — 99204 OFFICE O/P NEW MOD 45 MIN: CPT | Mod: S$PBB,,, | Performed by: FAMILY MEDICINE

## 2020-01-23 PROCEDURE — 99213 OFFICE O/P EST LOW 20 MIN: CPT | Mod: PBBFAC,PN | Performed by: FAMILY MEDICINE

## 2020-01-23 PROCEDURE — 99999 PR PBB SHADOW E&M-EST. PATIENT-LVL III: CPT | Mod: PBBFAC,,, | Performed by: FAMILY MEDICINE

## 2020-01-23 RX ORDER — ATENOLOL 100 MG/1
100 TABLET ORAL DAILY
COMMUNITY
End: 2020-07-21

## 2020-01-23 RX ORDER — OSELTAMIVIR PHOSPHATE 75 MG/1
75 CAPSULE ORAL DAILY
Qty: 10 CAPSULE | Refills: 0 | Status: SHIPPED | OUTPATIENT
Start: 2020-01-23 | End: 2020-02-06

## 2020-01-23 RX ORDER — TAMSULOSIN HYDROCHLORIDE 0.4 MG/1
0.4 CAPSULE ORAL
COMMUNITY
Start: 2019-12-06 | End: 2021-12-07

## 2020-01-23 RX ORDER — OMEPRAZOLE 40 MG/1
40 CAPSULE, DELAYED RELEASE ORAL DAILY
COMMUNITY
End: 2020-06-15 | Stop reason: SDUPTHER

## 2020-01-23 RX ORDER — OLMESARTAN MEDOXOMIL 40 MG/1
40 TABLET ORAL DAILY
COMMUNITY
End: 2020-08-21 | Stop reason: SDUPTHER

## 2020-01-23 RX ORDER — BUSPIRONE HYDROCHLORIDE 15 MG/1
7.5 TABLET ORAL 3 TIMES DAILY
COMMUNITY
End: 2020-07-21 | Stop reason: SDUPTHER

## 2020-01-23 RX ORDER — NAPROXEN 500 MG/1
500 TABLET ORAL 2 TIMES DAILY WITH MEALS
COMMUNITY
End: 2020-11-20 | Stop reason: SDUPTHER

## 2020-01-23 RX ORDER — ALPRAZOLAM 0.5 MG/1
0.5 TABLET ORAL NIGHTLY PRN
COMMUNITY
End: 2020-04-09 | Stop reason: SDUPTHER

## 2020-01-23 RX ORDER — GABAPENTIN 300 MG/1
300 CAPSULE ORAL 2 TIMES DAILY
COMMUNITY
End: 2020-10-26 | Stop reason: SDUPTHER

## 2020-01-23 RX ORDER — AMLODIPINE BESYLATE 10 MG/1
10 TABLET ORAL DAILY
COMMUNITY
End: 2020-10-26 | Stop reason: SDUPTHER

## 2020-01-23 RX ORDER — CLONIDINE HYDROCHLORIDE 0.1 MG/1
0.1 TABLET ORAL DAILY PRN
COMMUNITY
End: 2020-11-06

## 2020-01-23 NOTE — TELEPHONE ENCOUNTER
----- Message from Kristin Cartagena sent at 1/23/2020  3:12 PM CST -----  Contact: Renae/ [facility]/  [phone]  Type: Patient Call Back    Who called:  Patient    What is the request in detail:  Pharmacy needs clarification on patients medication oseltamivir (TAMIFLU) 75 MG capsule.  Thank you    Would the patient rather a call back or a response via [facility]?   Call back    Best call back number:  [phone]

## 2020-01-23 NOTE — TELEPHONE ENCOUNTER
I spoke to the pharmacy and advised should be tamiflu one tablet daily for 10 days per Dr. Mcintosh

## 2020-01-24 ENCOUNTER — PATIENT OUTREACH (OUTPATIENT)
Dept: ADMINISTRATIVE | Facility: HOSPITAL | Age: 80
End: 2020-01-24

## 2020-01-27 NOTE — PROGRESS NOTES
"Subjective:       Patient ID: Javid Daniel is a 79 y.o. male.    Chief Complaint: Establish Care    HPI   79 year old male with hypertension, anxiety, insomnia on chronic Xanax use, chronic low back pain, and GERD comes in to establish care. He reports taking all medications as prescribed. He reports no medications side effects.   The patient reports that he recently returned from a trip, and his nephew had been diagnosed with the flu. His wife was recently seen because she was having some URI symptoms. The patient reports no symptoms.    Review of Systems   Constitutional: Negative for unexpected weight change.   HENT: Negative for ear pain and sore throat.    Eyes: Negative for visual disturbance.   Respiratory: Negative for shortness of breath.    Cardiovascular: Negative for chest pain.   Gastrointestinal: Negative for abdominal pain and blood in stool.   Endocrine: Negative for cold intolerance and heat intolerance.   Genitourinary: Negative for dysuria and frequency.   Skin: Negative for rash.   Neurological: Negative for weakness, numbness and headaches.   Hematological: Negative for adenopathy.   Psychiatric/Behavioral: Negative for suicidal ideas.       Objective:     BP (!) 151/71   Pulse 68   Temp 97.8 °F (36.6 °C) (Oral)   Ht 5' 9" (1.753 m)   Wt 88.1 kg (194 lb 3.6 oz)   SpO2 99%   BMI 28.68 kg/m²     Physical Exam   Constitutional: He is oriented to person, place, and time. He appears well-developed and well-nourished. No distress.   HENT:   Head: Normocephalic and atraumatic.   Right Ear: Tympanic membrane, external ear and ear canal normal.   Left Ear: Tympanic membrane, external ear and ear canal normal.   Nose: Nose normal.   Mouth/Throat: Oropharynx is clear and moist.   Eyes: Pupils are equal, round, and reactive to light. Conjunctivae and EOM are normal. Right eye exhibits no discharge. Left eye exhibits no discharge.   Neck: Normal range of motion. Neck supple. No tracheal " deviation present. No thyromegaly present.   Cardiovascular: Normal rate, regular rhythm, S1 normal, S2 normal, normal heart sounds and intact distal pulses.   No murmur heard.  Pulses:       Radial pulses are 2+ on the right side, and 2+ on the left side.   Pulmonary/Chest: Effort normal and breath sounds normal. No respiratory distress. He has no wheezes. He has no rhonchi. He has no rales.   Abdominal: Soft. Bowel sounds are normal. He exhibits no distension and no mass. There is no tenderness. There is no rigidity, no guarding and no CVA tenderness.   Lymphadenopathy:     He has no cervical adenopathy.   Neurological: He is alert and oriented to person, place, and time. He has normal strength. He displays no atrophy. No cranial nerve deficit or sensory deficit. He exhibits normal muscle tone.   Reflex Scores:       Patellar reflexes are 2+ on the right side and 2+ on the left side.  Skin: Skin is warm and dry. Capillary refill takes less than 2 seconds. No rash noted. He is not diaphoretic.   Psychiatric: He has a normal mood and affect. His behavior is normal.   Vitals reviewed.      Patient had lab test done in December 2019 and brings those in  Assessment:       1. Hypertension, benign    2. Anxiety    3. Insomnia, unspecified type    4. Chronic midline low back pain without sciatica    5. Gastroesophageal reflux disease, esophagitis presence not specified    6. Exposure to the flu        Plan:       Javid was seen today for establish care.    Diagnoses and all orders for this visit:    Hypertension, benign  -     Comprehensive metabolic panel; Future  -     Lipid panel; Future  -     CBC auto differential; Future  -     Microalbumin/creatinine urine ratio; Future  Patient refuses medicatin change as he states that he is just nervous today.   Recheck BP in 2 weeks  If BP better, follow up in 3 months    Anxiety  -     TSH; Future  Will continue Buspar    Insomnia, unspecified type  -     CBC auto  differential; Future  -     TSH; Future  Risks of Xanax explained. Patient does not want to try other medications.  Will continue for now  Informed that correct use will need monitoring    Chronic midline low back pain without sciatica  Continue gabapentin    Gastroesophageal reflux disease, esophagitis presence not specified  Continue Prilosec    Exposure to the flu  -     oseltamivir (TAMIFLU) 75 MG capsule; Take 1 capsule (75 mg total) by mouth once daily. for 14 days  Tamiflu prophylaxis explained.

## 2020-02-20 ENCOUNTER — TELEPHONE (OUTPATIENT)
Dept: FAMILY MEDICINE | Facility: CLINIC | Age: 80
End: 2020-02-20

## 2020-02-20 NOTE — TELEPHONE ENCOUNTER
I spoke to the pt wife and she reports that they no longer require a visit. They are going to a specialist for pt back pain.

## 2020-02-20 NOTE — TELEPHONE ENCOUNTER
----- Message from Danielle Law sent at 2/20/2020  8:32 AM CST -----  Contact: Self  Type: Patient Call Back    Who called: Self  What is the request in detail: please call the patient about the back pain follow up needed    Can the clinic reply by [facility]? no   Would the patient rather a call back or a response via [facility]? call    Best call back number: [phone]

## 2020-04-09 RX ORDER — ALPRAZOLAM 0.5 MG/1
0.5 TABLET ORAL NIGHTLY PRN
Qty: 30 TABLET | Refills: 0 | Status: SHIPPED | OUTPATIENT
Start: 2020-04-09 | End: 2020-05-15

## 2020-05-15 RX ORDER — ALPRAZOLAM 0.5 MG/1
TABLET ORAL
Qty: 30 TABLET | Refills: 1 | Status: SHIPPED | OUTPATIENT
Start: 2020-05-15 | End: 2020-07-21 | Stop reason: SDUPTHER

## 2020-06-15 RX ORDER — OMEPRAZOLE 40 MG/1
40 CAPSULE, DELAYED RELEASE ORAL DAILY
Qty: 90 CAPSULE | Refills: 1 | Status: SHIPPED | OUTPATIENT
Start: 2020-06-15 | End: 2020-12-08 | Stop reason: SDUPTHER

## 2020-06-15 NOTE — TELEPHONE ENCOUNTER
----- Message from Raul Wharton sent at 6/15/2020 10:46 AM CDT -----  Regarding: refill  Type: RX Refill Request    Who Called: Javid     Refill or New Rx:refill     RX Name and Strength: omeprazole (PRILOSEC) 40 MG capsule    Is this a 30 day or 90 day Rx:90 day     Preferred Pharmacy with phone number: [id number] [place], [place] - [address] AT [facility] [address]    Would the patient rather a call back or a response via [facility]? Call back     Best Call Back Number:[phone]

## 2020-07-21 ENCOUNTER — OFFICE VISIT (OUTPATIENT)
Dept: FAMILY MEDICINE | Facility: CLINIC | Age: 80
End: 2020-07-21
Payer: MEDICARE

## 2020-07-21 VITALS
HEIGHT: 69 IN | RESPIRATION RATE: 17 BRPM | OXYGEN SATURATION: 95 % | BODY MASS INDEX: 28.18 KG/M2 | WEIGHT: 190.25 LBS | SYSTOLIC BLOOD PRESSURE: 163 MMHG | TEMPERATURE: 98 F | DIASTOLIC BLOOD PRESSURE: 77 MMHG | HEART RATE: 67 BPM

## 2020-07-21 DIAGNOSIS — K21.9 GASTROESOPHAGEAL REFLUX DISEASE, ESOPHAGITIS PRESENCE NOT SPECIFIED: ICD-10-CM

## 2020-07-21 DIAGNOSIS — I10 HYPERTENSION, BENIGN: Primary | ICD-10-CM

## 2020-07-21 DIAGNOSIS — F41.9 ANXIETY: ICD-10-CM

## 2020-07-21 DIAGNOSIS — G47.00 INSOMNIA, UNSPECIFIED TYPE: ICD-10-CM

## 2020-07-21 PROCEDURE — 99999 PR PBB SHADOW E&M-EST. PATIENT-LVL IV: CPT | Mod: PBBFAC,,, | Performed by: FAMILY MEDICINE

## 2020-07-21 PROCEDURE — 99214 PR OFFICE/OUTPT VISIT, EST, LEVL IV, 30-39 MIN: ICD-10-PCS | Mod: S$PBB,,, | Performed by: FAMILY MEDICINE

## 2020-07-21 PROCEDURE — 99214 OFFICE O/P EST MOD 30 MIN: CPT | Mod: S$PBB,,, | Performed by: FAMILY MEDICINE

## 2020-07-21 PROCEDURE — 99214 OFFICE O/P EST MOD 30 MIN: CPT | Mod: PBBFAC,PN | Performed by: FAMILY MEDICINE

## 2020-07-21 PROCEDURE — 99999 PR PBB SHADOW E&M-EST. PATIENT-LVL IV: ICD-10-PCS | Mod: PBBFAC,,, | Performed by: FAMILY MEDICINE

## 2020-07-21 RX ORDER — DICLOFENAC SODIUM 10 MG/G
GEL TOPICAL
COMMUNITY
Start: 2020-07-05 | End: 2022-11-22 | Stop reason: SDUPTHER

## 2020-07-21 RX ORDER — CARVEDILOL 12.5 MG/1
12.5 TABLET ORAL 2 TIMES DAILY WITH MEALS
Qty: 60 TABLET | Refills: 0 | Status: SHIPPED | OUTPATIENT
Start: 2020-07-21 | End: 2020-07-28 | Stop reason: SDUPTHER

## 2020-07-21 RX ORDER — BUSPIRONE HYDROCHLORIDE 15 MG/1
15 TABLET ORAL 3 TIMES DAILY
Qty: 270 TABLET | Refills: 0 | Status: SHIPPED | OUTPATIENT
Start: 2020-07-21 | End: 2020-10-18

## 2020-07-23 NOTE — PROGRESS NOTES
"Subjective:       Patient ID: Javid Daniel is a 79 y.o. male.    Chief Complaint: Follow-up    HPI   79 year old male comes in for follow up on hypertension, anxiety and insomnia. He did not come in for blood pressure check after his last visit. He reports that for his anxiety has been taking a full tablet of his Buspar 3 times a day and that he doing well with this.    He does not want to wean down on omeprazole as when he has tried his GERD flairs up.     States that he is using XANAX at bedtime for sleep. Not mixing with alcohol.       Review of Systems   Constitutional: Negative for chills and fever.   Respiratory: Negative for shortness of breath and wheezing.    Cardiovascular: Negative for chest pain and palpitations.   Gastrointestinal: Negative for abdominal pain and blood in stool.   Genitourinary: Negative for dysuria and hematuria.         Objective:     BP (!) 163/77 (BP Location: Left arm, Patient Position: Sitting, BP Method: Medium (Automatic))   Pulse 67   Temp 98.4 °F (36.9 °C) (Temporal)   Resp 17   Ht 5' 9.02" (1.753 m)   Wt 86.3 kg (190 lb 4.1 oz)   SpO2 95%   BMI 28.08 kg/m²     Physical Exam  Vitals signs reviewed.   Constitutional:       General: He is not in acute distress.     Appearance: He is well-developed. He is not diaphoretic.   HENT:      Head: Normocephalic and atraumatic.      Right Ear: Tympanic membrane, ear canal and external ear normal.      Left Ear: Tympanic membrane, ear canal and external ear normal.      Nose: Nose normal.   Eyes:      General:         Right eye: No discharge.         Left eye: No discharge.      Conjunctiva/sclera: Conjunctivae normal.      Pupils: Pupils are equal, round, and reactive to light.   Neck:      Musculoskeletal: Normal range of motion and neck supple.      Thyroid: No thyromegaly.      Trachea: No tracheal deviation.   Cardiovascular:      Rate and Rhythm: Normal rate and regular rhythm.      Pulses:           Radial pulses are " 2+ on the right side and 2+ on the left side.      Heart sounds: Normal heart sounds, S1 normal and S2 normal. No murmur.   Pulmonary:      Effort: Pulmonary effort is normal. No respiratory distress.      Breath sounds: Normal breath sounds. No wheezing, rhonchi or rales.   Abdominal:      General: Bowel sounds are normal. There is no distension.      Palpations: Abdomen is soft. Abdomen is not rigid. There is no mass.      Tenderness: There is no abdominal tenderness. There is no guarding.   Lymphadenopathy:      Cervical: No cervical adenopathy.   Skin:     General: Skin is warm and dry.      Capillary Refill: Capillary refill takes less than 2 seconds.      Findings: No rash.   Neurological:      Mental Status: He is alert and oriented to person, place, and time.      Cranial Nerves: No cranial nerve deficit.      Sensory: No sensory deficit.      Motor: No atrophy or abnormal muscle tone.      Deep Tendon Reflexes:      Reflex Scores:       Patellar reflexes are 2+ on the right side and 2+ on the left side.  Psychiatric:         Behavior: Behavior normal.         Assessment:       1. Hypertension, benign    2. Anxiety    3. Insomnia, unspecified type    4. Gastroesophageal reflux disease, esophagitis presence not specified        Plan:       Javid was seen today for follow-up.    Diagnoses and all orders for this visit:    Hypertension, benign  -     carvediloL (COREG) 12.5 MG tablet; Take 1 tablet (12.5 mg total) by mouth 2 (two) times daily with meals.  Will D/C atenolol and start in Coreg 12.5 BID. Recheck BP in 1 week and titrate if need.  Continue amlodipine 10 mg, and olmesartan 40 mg.    Anxiety  -     busPIRone (BUSPAR) 15 MG tablet; Take 1 tablet (15 mg total) by mouth 3 (three) times daily.  Continue Buspar 40 mg TID    Insomnia, unspecified type  No signs of Xanax misuse. Continue XANAX QHS    Gastroesophageal reflux disease, esophagitis presence not specified  Continue Omeprazole.

## 2020-07-28 ENCOUNTER — OFFICE VISIT (OUTPATIENT)
Dept: FAMILY MEDICINE | Facility: CLINIC | Age: 80
End: 2020-07-28
Payer: MEDICARE

## 2020-07-28 VITALS
HEIGHT: 68 IN | SYSTOLIC BLOOD PRESSURE: 130 MMHG | OXYGEN SATURATION: 97 % | DIASTOLIC BLOOD PRESSURE: 80 MMHG | WEIGHT: 190.94 LBS | HEART RATE: 62 BPM | BODY MASS INDEX: 28.94 KG/M2 | RESPIRATION RATE: 17 BRPM | TEMPERATURE: 99 F

## 2020-07-28 DIAGNOSIS — I10 HYPERTENSION, BENIGN: Primary | ICD-10-CM

## 2020-07-28 DIAGNOSIS — Z11.59 NEED FOR HEPATITIS C SCREENING TEST: ICD-10-CM

## 2020-07-28 DIAGNOSIS — F41.9 ANXIETY: ICD-10-CM

## 2020-07-28 DIAGNOSIS — G47.00 INSOMNIA, UNSPECIFIED TYPE: ICD-10-CM

## 2020-07-28 PROCEDURE — 99999 PR PBB SHADOW E&M-EST. PATIENT-LVL V: ICD-10-PCS | Mod: PBBFAC,,, | Performed by: FAMILY MEDICINE

## 2020-07-28 PROCEDURE — 99999 PR PBB SHADOW E&M-EST. PATIENT-LVL V: CPT | Mod: PBBFAC,,, | Performed by: FAMILY MEDICINE

## 2020-07-28 PROCEDURE — 99215 OFFICE O/P EST HI 40 MIN: CPT | Mod: PBBFAC,PN | Performed by: FAMILY MEDICINE

## 2020-07-28 PROCEDURE — 99214 OFFICE O/P EST MOD 30 MIN: CPT | Mod: S$PBB,,, | Performed by: FAMILY MEDICINE

## 2020-07-28 PROCEDURE — 99214 PR OFFICE/OUTPT VISIT, EST, LEVL IV, 30-39 MIN: ICD-10-PCS | Mod: S$PBB,,, | Performed by: FAMILY MEDICINE

## 2020-07-28 RX ORDER — FLUTICASONE PROPIONATE 50 MCG
2 SPRAY, SUSPENSION (ML) NASAL DAILY
Qty: 15.8 ML | Refills: 5 | Status: SHIPPED | OUTPATIENT
Start: 2020-07-28 | End: 2021-01-19

## 2020-07-28 RX ORDER — CARVEDILOL 12.5 MG/1
12.5 TABLET ORAL 2 TIMES DAILY WITH MEALS
Qty: 60 TABLET | Refills: 5 | Status: SHIPPED | OUTPATIENT
Start: 2020-07-28 | End: 2020-08-18

## 2020-08-02 NOTE — PROGRESS NOTES
"Subjective:       Patient ID: Javid Daniel is a 80 y.o. male.    Chief Complaint: Follow-up (HTN)    HPI   80 year old male comes in for hypertension follow up since adjusting his blood pressure medication. He reports tolerating the new regimen well without side effects. He also reports that he feels he has more energy than previously.    Review of Systems   Constitutional: Negative for fatigue and fever.   Eyes: Negative for visual disturbance.   Respiratory: Negative for chest tightness, shortness of breath and wheezing.    Cardiovascular: Negative for chest pain, palpitations and leg swelling.   Neurological: Negative for vertigo, tremors, syncope, numbness and headaches.         Objective:     /80 (BP Location: Left arm, Patient Position: Sitting, BP Method: Medium (Manual))   Pulse 62   Temp 98.5 °F (36.9 °C) (Oral)   Resp 17   Ht 5' 8" (1.727 m)   Wt 86.6 kg (190 lb 14.7 oz)   SpO2 97%   BMI 29.03 kg/m²     Physical Exam  Constitutional:       General: He is not in acute distress.     Appearance: He is not toxic-appearing.   Cardiovascular:      Rate and Rhythm: Normal rate and regular rhythm.      Pulses: Normal pulses.   Pulmonary:      Effort: Pulmonary effort is normal. No respiratory distress.      Breath sounds: No wheezing.   Abdominal:      General: Abdomen is flat.   Neurological:      Mental Status: He is alert.         Assessment:       1. Hypertension, benign    2. Anxiety    3. Insomnia, unspecified type    4. Need for hepatitis C screening test        Plan:       Javid was seen today for follow-up.    Diagnoses and all orders for this visit:    Hypertension, benign  -     carvediloL (COREG) 12.5 MG tablet; Take 1 tablet (12.5 mg total) by mouth 2 (two) times daily with meals.  -     Comprehensive metabolic panel; Future  -     Microalbumin/creatinine urine ratio; Future  BP improved.  Continue current regimen. Refill on Coreg sent.  Follow up in 6 months    Anxiety  Continue " Buspar 15 mg TID.    Insomnia, unspecified type  Continue Ambien.    Need for hepatitis C screening test  -     Hepatitis C Antibody; Future  Screen for HCV as per USPSTF guidelines

## 2020-10-17 ENCOUNTER — NURSE TRIAGE (OUTPATIENT)
Dept: ADMINISTRATIVE | Facility: CLINIC | Age: 80
End: 2020-10-17

## 2020-10-17 NOTE — TELEPHONE ENCOUNTER
No answer x 2    Reason for Disposition   Message left on unidentified voice mail.  Phone number verified.    Protocols used: NO CONTACT OR DUPLICATE CONTACT CALL-A-AH

## 2020-10-26 ENCOUNTER — OFFICE VISIT (OUTPATIENT)
Dept: FAMILY MEDICINE | Facility: CLINIC | Age: 80
End: 2020-10-26
Payer: MEDICARE

## 2020-10-26 VITALS
WEIGHT: 186.5 LBS | OXYGEN SATURATION: 97 % | HEART RATE: 72 BPM | DIASTOLIC BLOOD PRESSURE: 80 MMHG | SYSTOLIC BLOOD PRESSURE: 163 MMHG | BODY MASS INDEX: 28.26 KG/M2 | TEMPERATURE: 99 F | HEIGHT: 68 IN | RESPIRATION RATE: 18 BRPM

## 2020-10-26 DIAGNOSIS — G89.29 CHRONIC MIDLINE LOW BACK PAIN WITHOUT SCIATICA: ICD-10-CM

## 2020-10-26 DIAGNOSIS — I10 HYPERTENSION, BENIGN: Primary | ICD-10-CM

## 2020-10-26 DIAGNOSIS — M54.50 CHRONIC MIDLINE LOW BACK PAIN WITHOUT SCIATICA: ICD-10-CM

## 2020-10-26 DIAGNOSIS — F41.9 ANXIETY: ICD-10-CM

## 2020-10-26 PROCEDURE — 99214 OFFICE O/P EST MOD 30 MIN: CPT | Mod: S$PBB,,, | Performed by: FAMILY MEDICINE

## 2020-10-26 PROCEDURE — 99999 PR PBB SHADOW E&M-EST. PATIENT-LVL III: CPT | Mod: PBBFAC,,, | Performed by: FAMILY MEDICINE

## 2020-10-26 PROCEDURE — 99999 PR PBB SHADOW E&M-EST. PATIENT-LVL III: ICD-10-PCS | Mod: PBBFAC,,, | Performed by: FAMILY MEDICINE

## 2020-10-26 PROCEDURE — 99213 OFFICE O/P EST LOW 20 MIN: CPT | Mod: PBBFAC,PN | Performed by: FAMILY MEDICINE

## 2020-10-26 PROCEDURE — 99214 PR OFFICE/OUTPT VISIT, EST, LEVL IV, 30-39 MIN: ICD-10-PCS | Mod: S$PBB,,, | Performed by: FAMILY MEDICINE

## 2020-10-26 RX ORDER — OLMESARTAN MEDOXOMIL 40 MG/1
40 TABLET ORAL DAILY
Qty: 90 TABLET | Refills: 3 | Status: SHIPPED | OUTPATIENT
Start: 2020-10-26 | End: 2021-08-09

## 2020-10-26 RX ORDER — CARVEDILOL 12.5 MG/1
12.5 TABLET ORAL 2 TIMES DAILY WITH MEALS
Qty: 180 TABLET | Refills: 3 | Status: SHIPPED | OUTPATIENT
Start: 2020-10-26 | End: 2021-10-05 | Stop reason: SDUPTHER

## 2020-10-26 RX ORDER — GABAPENTIN 300 MG/1
300 CAPSULE ORAL 2 TIMES DAILY
Qty: 180 CAPSULE | Refills: 3 | Status: SHIPPED | OUTPATIENT
Start: 2020-10-26 | End: 2020-11-20 | Stop reason: SDUPTHER

## 2020-10-26 RX ORDER — AMLODIPINE BESYLATE 10 MG/1
10 TABLET ORAL DAILY
Qty: 90 TABLET | Refills: 3 | Status: SHIPPED | OUTPATIENT
Start: 2020-10-26 | End: 2021-10-05 | Stop reason: SDUPTHER

## 2020-10-26 NOTE — PROGRESS NOTES
Subjective:       Patient ID: Javid Daniel is a 80 y.o. male.    Chief Complaint: Hypertension    HPI   Hypertension: Patient here for follow-up of elevated blood pressure. He is not exercising and is adherent to low salt diet.  Blood pressure is well controlled at home states that top number is usually less than 118 and bottom number in the 60s and 70s. Cardiac symptoms none. Patient denies chest pain, exertional chest pressure/discomfort, fatigue, irregular heart beat, lower extremity edema, near-syncope, orthopnea, palpitations, paroxysmal nocturnal dyspnea, syncope and tachypnea.  Cardiovascular risk factors: none. Use of agents associated with hypertension: none. History of target organ damage: none.  He reports having coffee twice a day.    Chronic Back pain: States that Gabapentin works well to control pain. No side effects.    Review of Systems   Constitutional: Negative for fatigue and fever.   Eyes: Negative for visual disturbance.   Respiratory: Negative for chest tightness, shortness of breath and wheezing.    Cardiovascular: Negative for chest pain, palpitations and leg swelling.   Neurological: Negative for vertigo, tremors, syncope, numbness and headaches.         Objective:      Physical Exam  Constitutional:       General: He is not in acute distress.     Appearance: He is not toxic-appearing.   Cardiovascular:      Rate and Rhythm: Normal rate and regular rhythm.      Pulses: Normal pulses.   Pulmonary:      Effort: Pulmonary effort is normal. No respiratory distress.      Breath sounds: No wheezing.   Abdominal:      General: Abdomen is flat.   Neurological:      Mental Status: He is alert.         Assessment:       1. Hypertension, benign    2. Chronic midline low back pain without sciatica    3. Anxiety        Plan:       Javid was seen today for hypertension.    Diagnoses and all orders for this visit:    Hypertension, benign  -     Comprehensive Metabolic Panel; Future  -     Lipid  Panel; Future  -     amLODIPine (NORVASC) 10 MG tablet; Take 1 tablet (10 mg total) by mouth once daily.  -     olmesartan (BENICAR) 40 MG tablet; Take 1 tablet (40 mg total) by mouth once daily.  -     carvediloL (COREG) 12.5 MG tablet; Take 1 tablet (12.5 mg total) by mouth 2 (two) times daily with meals.  Will recheck BP in a week. Advised no coffee or caffeine prior to coming in.     Chronic midline low back pain without sciatica  -     gabapentin (NEURONTIN) 300 MG capsule; Take 1 capsule (300 mg total) by mouth 2 (two) times daily.  Continue gabapentin    Anxiety  [place]  checked.  Continue Xanax QHS PRN.

## 2020-11-04 ENCOUNTER — TELEPHONE (OUTPATIENT)
Dept: FAMILY MEDICINE | Facility: CLINIC | Age: 80
End: 2020-11-04

## 2020-11-04 NOTE — TELEPHONE ENCOUNTER
Spoke with family member Velia that no other labs were needed and rescheduled BP check appt for this Friday 8/6/20 @ 1000.

## 2020-11-04 NOTE — TELEPHONE ENCOUNTER
----- Message from Maxx Mcintosh Jr., MD sent at 11/2/2020  9:09 AM CST -----  Regarding: RE: LABS  Let them know he did the labs last week. No other labs needed.   ----- Message -----  From: Omaira Brown RN  Sent: 11/2/2020   8:53 AM CST  To: Maxx Mcintosh Jr., MD  Subject: LABS                                             Good morning Dr. Mcintosh!  Mr. Hua was on the nurse schedule today for BP check, but when I spoke with a family member (Velia) to reschedule to [place], she stated that he was fasting for labs this AM.  I explained that he was only on the schedule for a BP check.  She was adamant that you wanted labs today fasting.  Please clarify and I will reschedule both for lab and BP.  He last saw you on 10/26 and CMP and lipids were collected.  Thank you!!!!!

## 2020-11-06 ENCOUNTER — CLINICAL SUPPORT (OUTPATIENT)
Dept: FAMILY MEDICINE | Facility: CLINIC | Age: 80
End: 2020-11-06
Payer: MEDICARE

## 2020-11-06 VITALS
SYSTOLIC BLOOD PRESSURE: 144 MMHG | RESPIRATION RATE: 18 BRPM | BODY MASS INDEX: 28.36 KG/M2 | OXYGEN SATURATION: 98 % | HEIGHT: 68 IN | HEART RATE: 62 BPM | DIASTOLIC BLOOD PRESSURE: 64 MMHG

## 2020-11-06 DIAGNOSIS — I10 HYPERTENSION, BENIGN: Primary | ICD-10-CM

## 2020-11-06 PROCEDURE — 99499 NO LOS: ICD-10-PCS | Mod: S$PBB,,, | Performed by: FAMILY MEDICINE

## 2020-11-06 PROCEDURE — 99499 UNLISTED E&M SERVICE: CPT | Mod: S$PBB,,, | Performed by: FAMILY MEDICINE

## 2020-11-06 PROCEDURE — 99213 OFFICE O/P EST LOW 20 MIN: CPT | Mod: PBBFAC,PN

## 2020-11-06 PROCEDURE — 99999 PR PBB SHADOW E&M-EST. PATIENT-LVL III: CPT | Mod: PBBFAC,,,

## 2020-11-06 PROCEDURE — 99999 PR PBB SHADOW E&M-EST. PATIENT-LVL III: ICD-10-PCS | Mod: PBBFAC,,,

## 2020-11-06 RX ORDER — CLONIDINE HYDROCHLORIDE 0.1 MG/1
0.1 TABLET ORAL NIGHTLY PRN
Qty: 30 TABLET | Refills: 2 | Status: SHIPPED | OUTPATIENT
Start: 2020-11-06 | End: 2021-02-04 | Stop reason: SDUPTHER

## 2020-11-06 NOTE — PROGRESS NOTES
.  Javid Daniel 80 y.o. male is here today for Blood Pressure check.   History of HTN yes.    Review of patient's allergies indicates:  No Known Allergies  Creatinine   Date Value Ref Range Status   10/26/2020 0.9 0.5 - 1.4 mg/dL Final   12/03/2019 0.9 mg/dL Final     Sodium   Date Value Ref Range Status   10/26/2020 135 (L) 136 - 145 mmol/L Final   12/03/2019 137  Final     Potassium   Date Value Ref Range Status   10/26/2020 4.4 3.5 - 5.1 mmol/L Final   12/03/2019 4.4  Final     Patient verifies taking blood pressure medications on a regular basis at the same time of the day.     Current Outpatient Medications:     ALPRAZolam (XANAX) 0.5 MG tablet, TAKE 1 TABLET BY MOUTH EVERY NIGHT AT BEDTIME AS NEEDED FOR ANXIETY, Disp: 30 tablet, Rfl: 3    amLODIPine (NORVASC) 10 MG tablet, Take 1 tablet (10 mg total) by mouth once daily., Disp: 90 tablet, Rfl: 3    busPIRone (BUSPAR) 15 MG tablet, TAKE 1 TABLET BY MOUTH THREE TIMES DAILY, Disp: 270 tablet, Rfl: 0    carvediloL (COREG) 12.5 MG tablet, Take 1 tablet (12.5 mg total) by mouth 2 (two) times daily with meals., Disp: 180 tablet, Rfl: 3    cloNIDine (CATAPRES) 0.1 MG tablet, Take 0.1 mg by mouth daily as needed (when SBP>160)., Disp: , Rfl:     diclofenac sodium (VOLTAREN) 1 % Gel, APPLY 3 TO 4 GRAMS ONTO THE AFFECTED AREA OF THE SKIN BID, Disp: , Rfl:     fluticasone propionate (FLONASE) 50 mcg/actuation nasal spray, 2 sprays (100 mcg total) by Each Nostril route once daily., Disp: 15.8 mL, Rfl: 5    gabapentin (NEURONTIN) 300 MG capsule, Take 1 capsule (300 mg total) by mouth 2 (two) times daily., Disp: 180 capsule, Rfl: 3    naproxen (NAPROSYN) 500 MG tablet, Take 500 mg by mouth 2 (two) times daily with meals., Disp: , Rfl:     olmesartan (BENICAR) 40 MG tablet, Take 1 tablet (40 mg total) by mouth once daily., Disp: 90 tablet, Rfl: 3    omeprazole (PRILOSEC) 40 MG capsule, Take 1 capsule (40 mg total) by mouth once daily., Disp: 90 capsule,  Rfl: 1    tamsulosin (FLOMAX) 0.4 mg Cap, Take 0.4 mg by mouth., Disp: , Rfl:   Does patient have record of home blood pressure readings yes. Readings have been averaging ?.   Last dose of blood pressure medication was taken this AM.  Patient is asymptomatic.   Complains of no pain or discomfort at this time.    BP: (!) 164/82 , Pulse: 62 .    Blood pressure reading after 15 minutes was 144/64, Pulse 62.  Dr. Mcintosh notified.

## 2020-11-20 ENCOUNTER — CLINICAL SUPPORT (OUTPATIENT)
Dept: FAMILY MEDICINE | Facility: CLINIC | Age: 80
End: 2020-11-20
Payer: MEDICARE

## 2020-11-20 VITALS — SYSTOLIC BLOOD PRESSURE: 120 MMHG | DIASTOLIC BLOOD PRESSURE: 82 MMHG | HEART RATE: 60 BPM

## 2020-11-20 DIAGNOSIS — M54.50 CHRONIC MIDLINE LOW BACK PAIN WITHOUT SCIATICA: ICD-10-CM

## 2020-11-20 DIAGNOSIS — Z01.30 BP CHECK: Primary | ICD-10-CM

## 2020-11-20 DIAGNOSIS — G89.29 CHRONIC MIDLINE LOW BACK PAIN WITHOUT SCIATICA: ICD-10-CM

## 2020-11-20 PROCEDURE — 99499 NO LOS: ICD-10-PCS | Mod: S$PBB,,, | Performed by: FAMILY MEDICINE

## 2020-11-20 PROCEDURE — 99211 OFF/OP EST MAY X REQ PHY/QHP: CPT | Mod: PBBFAC,PN

## 2020-11-20 PROCEDURE — 99499 UNLISTED E&M SERVICE: CPT | Mod: S$PBB,,, | Performed by: FAMILY MEDICINE

## 2020-11-20 PROCEDURE — 99999 PR PBB SHADOW E&M-EST. PATIENT-LVL I: ICD-10-PCS | Mod: PBBFAC,,,

## 2020-11-20 PROCEDURE — 99999 PR PBB SHADOW E&M-EST. PATIENT-LVL I: CPT | Mod: PBBFAC,,,

## 2020-11-20 RX ORDER — GABAPENTIN 300 MG/1
300 CAPSULE ORAL 2 TIMES DAILY
Qty: 180 CAPSULE | Refills: 3 | Status: SHIPPED | OUTPATIENT
Start: 2020-11-20 | End: 2021-12-07 | Stop reason: SDUPTHER

## 2020-11-20 RX ORDER — NAPROXEN 500 MG/1
500 TABLET ORAL 2 TIMES DAILY WITH MEALS
Qty: 60 TABLET | Refills: 0 | Status: SHIPPED | OUTPATIENT
Start: 2020-11-20 | End: 2020-12-17 | Stop reason: SDUPTHER

## 2020-11-20 NOTE — PROGRESS NOTES
.  Javid Daniel 80 y.o. male is here today for Blood Pressure check.   History of HTN yes.    Review of patient's allergies indicates:  No Known Allergies  Creatinine   Date Value Ref Range Status   10/26/2020 0.9 0.5 - 1.4 mg/dL Final   12/03/2019 0.9 mg/dL Final     Sodium   Date Value Ref Range Status   10/26/2020 135 (L) 136 - 145 mmol/L Final   12/03/2019 137  Final     Potassium   Date Value Ref Range Status   10/26/2020 4.4 3.5 - 5.1 mmol/L Final   12/03/2019 4.4  Final   ]  Patient verifies taking blood pressure medications on a regular basis at the same time of the day.     Current Outpatient Medications:     ALPRAZolam (XANAX) 0.5 MG tablet, TAKE 1 TABLET BY MOUTH EVERY NIGHT AT BEDTIME AS NEEDED FOR ANXIETY, Disp: 30 tablet, Rfl: 3    amLODIPine (NORVASC) 10 MG tablet, Take 1 tablet (10 mg total) by mouth once daily., Disp: 90 tablet, Rfl: 3    busPIRone (BUSPAR) 15 MG tablet, TAKE 1 TABLET BY MOUTH THREE TIMES DAILY, Disp: 270 tablet, Rfl: 0    carvediloL (COREG) 12.5 MG tablet, Take 1 tablet (12.5 mg total) by mouth 2 (two) times daily with meals., Disp: 180 tablet, Rfl: 3    cloNIDine (CATAPRES) 0.1 MG tablet, Take 1 tablet (0.1 mg total) by mouth nightly as needed., Disp: 30 tablet, Rfl: 2    diclofenac sodium (VOLTAREN) 1 % Gel, APPLY 3 TO 4 GRAMS ONTO THE AFFECTED AREA OF THE SKIN BID, Disp: , Rfl:     fluticasone propionate (FLONASE) 50 mcg/actuation nasal spray, 2 sprays (100 mcg total) by Each Nostril route once daily., Disp: 15.8 mL, Rfl: 5    gabapentin (NEURONTIN) 300 MG capsule, Take 1 capsule (300 mg total) by mouth 2 (two) times daily., Disp: 180 capsule, Rfl: 3    naproxen (NAPROSYN) 500 MG tablet, Take 500 mg by mouth 2 (two) times daily with meals., Disp: , Rfl:     olmesartan (BENICAR) 40 MG tablet, Take 1 tablet (40 mg total) by mouth once daily., Disp: 90 tablet, Rfl: 3    omeprazole (PRILOSEC) 40 MG capsule, Take 1 capsule (40 mg total) by mouth once daily., Disp:  90 capsule, Rfl: 1    tamsulosin (FLOMAX) 0.4 mg Cap, Take 0.4 mg by mouth., Disp: , Rfl:   Does patient have record of home blood pressure readings no.  Last dose of blood pressure medication was taken at AM.  Patient is asymptomatic.       BP: 120/82 , Pulse: 60 .

## 2020-11-20 NOTE — TELEPHONE ENCOUNTER
Pt requesting for refills for Gabapentin, Naproxen, and Levothyroxine. Levothyroxine not on current med list. Pt requesting from PCP if he can take over the counter Melatonin 0.5 mg once daily to sleep.

## 2020-12-08 RX ORDER — OMEPRAZOLE 40 MG/1
40 CAPSULE, DELAYED RELEASE ORAL DAILY
Qty: 90 CAPSULE | Refills: 1 | Status: SHIPPED | OUTPATIENT
Start: 2020-12-08 | End: 2020-12-09 | Stop reason: SDUPTHER

## 2020-12-10 RX ORDER — OMEPRAZOLE 40 MG/1
40 CAPSULE, DELAYED RELEASE ORAL DAILY
Qty: 90 CAPSULE | Refills: 1 | Status: SHIPPED | OUTPATIENT
Start: 2020-12-10 | End: 2021-12-14 | Stop reason: SDUPTHER

## 2020-12-17 RX ORDER — NAPROXEN 500 MG/1
500 TABLET ORAL 2 TIMES DAILY WITH MEALS
Qty: 60 TABLET | Refills: 0 | Status: SHIPPED | OUTPATIENT
Start: 2020-12-17 | End: 2021-01-13 | Stop reason: SDUPTHER

## 2020-12-22 RX ORDER — ALPRAZOLAM 0.5 MG/1
0.5 TABLET ORAL NIGHTLY PRN
Qty: 30 TABLET | Refills: 3 | Status: SHIPPED | OUTPATIENT
Start: 2020-12-22 | End: 2021-04-22 | Stop reason: SDUPTHER

## 2021-01-11 ENCOUNTER — IMMUNIZATION (OUTPATIENT)
Dept: OBSTETRICS AND GYNECOLOGY | Facility: CLINIC | Age: 81
End: 2021-01-11
Payer: MEDICARE

## 2021-01-11 DIAGNOSIS — Z23 NEED FOR VACCINATION: ICD-10-CM

## 2021-01-11 PROCEDURE — 91300 COVID-19, MRNA, LNP-S, PF, 30 MCG/0.3 ML DOSE VACCINE: CPT | Mod: PBBFAC

## 2021-02-01 ENCOUNTER — IMMUNIZATION (OUTPATIENT)
Dept: OBSTETRICS AND GYNECOLOGY | Facility: CLINIC | Age: 81
End: 2021-02-01
Payer: MEDICARE

## 2021-02-01 DIAGNOSIS — Z23 NEED FOR VACCINATION: Primary | ICD-10-CM

## 2021-02-01 PROCEDURE — 0002A COVID-19, MRNA, LNP-S, PF, 30 MCG/0.3 ML DOSE VACCINE: CPT | Mod: PBBFAC | Performed by: NURSE PRACTITIONER

## 2021-02-01 PROCEDURE — 91300 COVID-19, MRNA, LNP-S, PF, 30 MCG/0.3 ML DOSE VACCINE: CPT | Mod: PBBFAC | Performed by: NURSE PRACTITIONER

## 2021-02-02 ENCOUNTER — TELEPHONE (OUTPATIENT)
Dept: FAMILY MEDICINE | Facility: CLINIC | Age: 81
End: 2021-02-02

## 2021-02-02 RX ORDER — NAPROXEN 500 MG/1
500 TABLET ORAL 2 TIMES DAILY WITH MEALS
Qty: 30 TABLET | Refills: 0 | Status: SHIPPED | OUTPATIENT
Start: 2021-02-02 | End: 2021-03-05

## 2021-02-02 RX ORDER — NAPROXEN 500 MG/1
500 TABLET ORAL 2 TIMES DAILY WITH MEALS
Qty: 30 TABLET | Refills: 0 | OUTPATIENT
Start: 2021-02-02

## 2021-02-03 RX ORDER — NAPROXEN 500 MG/1
500 TABLET ORAL 2 TIMES DAILY WITH MEALS
Qty: 30 TABLET | Refills: 0 | OUTPATIENT
Start: 2021-02-03

## 2021-02-04 RX ORDER — CLONIDINE HYDROCHLORIDE 0.1 MG/1
0.1 TABLET ORAL NIGHTLY PRN
Qty: 30 TABLET | Refills: 2 | Status: SHIPPED | OUTPATIENT
Start: 2021-02-04 | End: 2021-05-07 | Stop reason: SDUPTHER

## 2021-02-05 ENCOUNTER — PATIENT MESSAGE (OUTPATIENT)
Dept: FAMILY MEDICINE | Facility: CLINIC | Age: 81
End: 2021-02-05

## 2021-02-18 RX ORDER — NAPROXEN 500 MG/1
500 TABLET ORAL 2 TIMES DAILY WITH MEALS
Qty: 30 TABLET | Refills: 0 | OUTPATIENT
Start: 2021-02-18

## 2021-03-02 RX ORDER — NAPROXEN 500 MG/1
500 TABLET ORAL 2 TIMES DAILY WITH MEALS
Qty: 30 TABLET | Refills: 0 | OUTPATIENT
Start: 2021-03-02

## 2021-03-05 ENCOUNTER — LAB VISIT (OUTPATIENT)
Dept: LAB | Facility: HOSPITAL | Age: 81
End: 2021-03-05
Attending: FAMILY MEDICINE
Payer: MEDICARE

## 2021-03-05 ENCOUNTER — OFFICE VISIT (OUTPATIENT)
Dept: FAMILY MEDICINE | Facility: CLINIC | Age: 81
End: 2021-03-05
Payer: MEDICARE

## 2021-03-05 VITALS
OXYGEN SATURATION: 99 % | TEMPERATURE: 98 F | WEIGHT: 182.31 LBS | HEART RATE: 68 BPM | SYSTOLIC BLOOD PRESSURE: 132 MMHG | BODY MASS INDEX: 27.63 KG/M2 | RESPIRATION RATE: 18 BRPM | HEIGHT: 68 IN | DIASTOLIC BLOOD PRESSURE: 80 MMHG

## 2021-03-05 DIAGNOSIS — I10 HYPERTENSION, BENIGN: ICD-10-CM

## 2021-03-05 DIAGNOSIS — G89.29 CHRONIC MIDLINE LOW BACK PAIN WITHOUT SCIATICA: ICD-10-CM

## 2021-03-05 DIAGNOSIS — Z00.00 ROUTINE MEDICAL EXAM: ICD-10-CM

## 2021-03-05 DIAGNOSIS — Z00.00 ROUTINE MEDICAL EXAM: Primary | ICD-10-CM

## 2021-03-05 DIAGNOSIS — G47.00 INSOMNIA, UNSPECIFIED TYPE: ICD-10-CM

## 2021-03-05 DIAGNOSIS — M54.50 CHRONIC MIDLINE LOW BACK PAIN WITHOUT SCIATICA: ICD-10-CM

## 2021-03-05 DIAGNOSIS — F41.9 ANXIETY: ICD-10-CM

## 2021-03-05 LAB
ALBUMIN SERPL BCP-MCNC: 4.3 G/DL (ref 3.5–5.2)
ALP SERPL-CCNC: 52 U/L (ref 55–135)
ALT SERPL W/O P-5'-P-CCNC: 10 U/L (ref 10–44)
ANION GAP SERPL CALC-SCNC: 9 MMOL/L (ref 8–16)
AST SERPL-CCNC: 20 U/L (ref 10–40)
BASOPHILS # BLD AUTO: 0.02 K/UL (ref 0–0.2)
BASOPHILS NFR BLD: 0.3 % (ref 0–1.9)
BILIRUB SERPL-MCNC: 1.1 MG/DL (ref 0.1–1)
BUN SERPL-MCNC: 13 MG/DL (ref 8–23)
CALCIUM SERPL-MCNC: 9.1 MG/DL (ref 8.7–10.5)
CHLORIDE SERPL-SCNC: 99 MMOL/L (ref 95–110)
CO2 SERPL-SCNC: 24 MMOL/L (ref 23–29)
CREAT SERPL-MCNC: 0.8 MG/DL (ref 0.5–1.4)
DIFFERENTIAL METHOD: ABNORMAL
EOSINOPHIL # BLD AUTO: 0.1 K/UL (ref 0–0.5)
EOSINOPHIL NFR BLD: 1.5 % (ref 0–8)
ERYTHROCYTE [DISTWIDTH] IN BLOOD BY AUTOMATED COUNT: 12.8 % (ref 11.5–14.5)
EST. GFR  (AFRICAN AMERICAN): >60 ML/MIN/1.73 M^2
EST. GFR  (NON AFRICAN AMERICAN): >60 ML/MIN/1.73 M^2
GLUCOSE SERPL-MCNC: 105 MG/DL (ref 70–110)
HCT VFR BLD AUTO: 40.5 % (ref 40–54)
HGB BLD-MCNC: 13.7 G/DL (ref 14–18)
IMM GRANULOCYTES # BLD AUTO: 0.02 K/UL (ref 0–0.04)
IMM GRANULOCYTES NFR BLD AUTO: 0.3 % (ref 0–0.5)
LYMPHOCYTES # BLD AUTO: 1.5 K/UL (ref 1–4.8)
LYMPHOCYTES NFR BLD: 22.5 % (ref 18–48)
MCH RBC QN AUTO: 30.7 PG (ref 27–31)
MCHC RBC AUTO-ENTMCNC: 33.8 G/DL (ref 32–36)
MCV RBC AUTO: 91 FL (ref 82–98)
MONOCYTES # BLD AUTO: 0.4 K/UL (ref 0.3–1)
MONOCYTES NFR BLD: 6 % (ref 4–15)
NEUTROPHILS # BLD AUTO: 4.6 K/UL (ref 1.8–7.7)
NEUTROPHILS NFR BLD: 69.4 % (ref 38–73)
NRBC BLD-RTO: 0 /100 WBC
PLATELET # BLD AUTO: 141 K/UL (ref 150–350)
PMV BLD AUTO: 13 FL (ref 9.2–12.9)
POTASSIUM SERPL-SCNC: 5.2 MMOL/L (ref 3.5–5.1)
PROT SERPL-MCNC: 7 G/DL (ref 6–8.4)
RBC # BLD AUTO: 4.46 M/UL (ref 4.6–6.2)
SODIUM SERPL-SCNC: 132 MMOL/L (ref 136–145)
WBC # BLD AUTO: 6.68 K/UL (ref 3.9–12.7)

## 2021-03-05 PROCEDURE — 3079F DIAST BP 80-89 MM HG: CPT | Mod: CPTII,S$GLB,, | Performed by: FAMILY MEDICINE

## 2021-03-05 PROCEDURE — 99999 PR PBB SHADOW E&M-EST. PATIENT-LVL V: ICD-10-PCS | Mod: PBBFAC,,, | Performed by: FAMILY MEDICINE

## 2021-03-05 PROCEDURE — 80053 COMPREHEN METABOLIC PANEL: CPT | Performed by: FAMILY MEDICINE

## 2021-03-05 PROCEDURE — 99999 PR PBB SHADOW E&M-EST. PATIENT-LVL V: CPT | Mod: PBBFAC,,, | Performed by: FAMILY MEDICINE

## 2021-03-05 PROCEDURE — 1101F PT FALLS ASSESS-DOCD LE1/YR: CPT | Mod: CPTII,S$GLB,, | Performed by: FAMILY MEDICINE

## 2021-03-05 PROCEDURE — 3288F FALL RISK ASSESSMENT DOCD: CPT | Mod: CPTII,S$GLB,, | Performed by: FAMILY MEDICINE

## 2021-03-05 PROCEDURE — 3075F PR MOST RECENT SYSTOLIC BLOOD PRESS GE 130-139MM HG: ICD-10-PCS | Mod: CPTII,S$GLB,, | Performed by: FAMILY MEDICINE

## 2021-03-05 PROCEDURE — 99397 PR PREVENTIVE VISIT,EST,65 & OVER: ICD-10-PCS | Mod: S$GLB,,, | Performed by: FAMILY MEDICINE

## 2021-03-05 PROCEDURE — 3079F PR MOST RECENT DIASTOLIC BLOOD PRESSURE 80-89 MM HG: ICD-10-PCS | Mod: CPTII,S$GLB,, | Performed by: FAMILY MEDICINE

## 2021-03-05 PROCEDURE — 3075F SYST BP GE 130 - 139MM HG: CPT | Mod: CPTII,S$GLB,, | Performed by: FAMILY MEDICINE

## 2021-03-05 PROCEDURE — 1126F PR PAIN SEVERITY QUANTIFIED, NO PAIN PRESENT: ICD-10-PCS | Mod: S$GLB,,, | Performed by: FAMILY MEDICINE

## 2021-03-05 PROCEDURE — 99397 PER PM REEVAL EST PAT 65+ YR: CPT | Mod: S$GLB,,, | Performed by: FAMILY MEDICINE

## 2021-03-05 PROCEDURE — 1126F AMNT PAIN NOTED NONE PRSNT: CPT | Mod: S$GLB,,, | Performed by: FAMILY MEDICINE

## 2021-03-05 PROCEDURE — 85025 COMPLETE CBC W/AUTO DIFF WBC: CPT | Performed by: FAMILY MEDICINE

## 2021-03-05 PROCEDURE — 3288F PR FALLS RISK ASSESSMENT DOCUMENTED: ICD-10-PCS | Mod: CPTII,S$GLB,, | Performed by: FAMILY MEDICINE

## 2021-03-05 PROCEDURE — 1101F PR PT FALLS ASSESS DOC 0-1 FALLS W/OUT INJ PAST YR: ICD-10-PCS | Mod: CPTII,S$GLB,, | Performed by: FAMILY MEDICINE

## 2021-03-05 RX ORDER — MELOXICAM 7.5 MG/1
7.5 TABLET ORAL DAILY
Qty: 30 TABLET | Refills: 4 | Status: SHIPPED | OUTPATIENT
Start: 2021-03-05 | End: 2021-07-19

## 2021-03-08 ENCOUNTER — OFFICE VISIT (OUTPATIENT)
Dept: OPTOMETRY | Facility: CLINIC | Age: 81
End: 2021-03-08
Payer: MEDICARE

## 2021-03-08 DIAGNOSIS — Z01.00 ROUTINE EYE EXAM: Primary | ICD-10-CM

## 2021-03-08 DIAGNOSIS — H25.13 NUCLEAR SCLEROSIS OF BOTH EYES: ICD-10-CM

## 2021-03-08 DIAGNOSIS — H04.129 DRY EYE SYNDROME, UNSPECIFIED LATERALITY: ICD-10-CM

## 2021-03-08 DIAGNOSIS — H52.7 REFRACTIVE ERROR: ICD-10-CM

## 2021-03-08 PROCEDURE — 92015 DETERMINE REFRACTIVE STATE: CPT | Mod: S$GLB,,, | Performed by: OPTOMETRIST

## 2021-03-08 PROCEDURE — 1126F PR PAIN SEVERITY QUANTIFIED, NO PAIN PRESENT: ICD-10-PCS | Mod: S$GLB,,, | Performed by: OPTOMETRIST

## 2021-03-08 PROCEDURE — 3288F PR FALLS RISK ASSESSMENT DOCUMENTED: ICD-10-PCS | Mod: CPTII,S$GLB,, | Performed by: OPTOMETRIST

## 2021-03-08 PROCEDURE — 1126F AMNT PAIN NOTED NONE PRSNT: CPT | Mod: S$GLB,,, | Performed by: OPTOMETRIST

## 2021-03-08 PROCEDURE — 99999 PR PBB SHADOW E&M-EST. PATIENT-LVL II: ICD-10-PCS | Mod: PBBFAC,,, | Performed by: OPTOMETRIST

## 2021-03-08 PROCEDURE — 99999 PR PBB SHADOW E&M-EST. PATIENT-LVL II: CPT | Mod: PBBFAC,,, | Performed by: OPTOMETRIST

## 2021-03-08 PROCEDURE — 3288F FALL RISK ASSESSMENT DOCD: CPT | Mod: CPTII,S$GLB,, | Performed by: OPTOMETRIST

## 2021-03-08 PROCEDURE — 92004 PR EYE EXAM, NEW PATIENT,COMPREHESV: ICD-10-PCS | Mod: S$GLB,,, | Performed by: OPTOMETRIST

## 2021-03-08 PROCEDURE — 92015 PR REFRACTION: ICD-10-PCS | Mod: S$GLB,,, | Performed by: OPTOMETRIST

## 2021-03-08 PROCEDURE — 1101F PT FALLS ASSESS-DOCD LE1/YR: CPT | Mod: CPTII,S$GLB,, | Performed by: OPTOMETRIST

## 2021-03-08 PROCEDURE — 1101F PR PT FALLS ASSESS DOC 0-1 FALLS W/OUT INJ PAST YR: ICD-10-PCS | Mod: CPTII,S$GLB,, | Performed by: OPTOMETRIST

## 2021-03-08 PROCEDURE — 92004 COMPRE OPH EXAM NEW PT 1/>: CPT | Mod: S$GLB,,, | Performed by: OPTOMETRIST

## 2021-04-13 RX ORDER — FLUTICASONE PROPIONATE 50 MCG
2 SPRAY, SUSPENSION (ML) NASAL DAILY
Qty: 16 G | Refills: 2 | Status: SHIPPED | OUTPATIENT
Start: 2021-04-13 | End: 2021-07-20 | Stop reason: SDUPTHER

## 2021-04-23 RX ORDER — ALPRAZOLAM 0.5 MG/1
0.5 TABLET ORAL NIGHTLY PRN
Qty: 30 TABLET | Refills: 3 | Status: SHIPPED | OUTPATIENT
Start: 2021-04-23 | End: 2021-08-19 | Stop reason: SDUPTHER

## 2021-05-10 RX ORDER — CLONIDINE HYDROCHLORIDE 0.1 MG/1
0.1 TABLET ORAL NIGHTLY PRN
Qty: 30 TABLET | Refills: 2 | Status: SHIPPED | OUTPATIENT
Start: 2021-05-10 | End: 2021-05-14 | Stop reason: SDUPTHER

## 2021-05-14 RX ORDER — CLONIDINE HYDROCHLORIDE 0.1 MG/1
0.1 TABLET ORAL NIGHTLY PRN
Qty: 30 TABLET | Refills: 2 | Status: SHIPPED | OUTPATIENT
Start: 2021-05-14 | End: 2021-07-30

## 2021-07-20 RX ORDER — FLUTICASONE PROPIONATE 50 MCG
2 SPRAY, SUSPENSION (ML) NASAL DAILY
Qty: 16 G | Refills: 2 | Status: SHIPPED | OUTPATIENT
Start: 2021-07-20 | End: 2021-10-16

## 2021-08-04 ENCOUNTER — LAB VISIT (OUTPATIENT)
Dept: PRIMARY CARE CLINIC | Facility: CLINIC | Age: 81
End: 2021-08-04
Payer: MEDICAID

## 2021-08-04 DIAGNOSIS — Z20.822 ENCOUNTER FOR LABORATORY TESTING FOR COVID-19 VIRUS: ICD-10-CM

## 2021-08-04 PROCEDURE — U0005 INFEC AGEN DETEC AMPLI PROBE: HCPCS | Performed by: INTERNAL MEDICINE

## 2021-08-04 PROCEDURE — U0003 INFECTIOUS AGENT DETECTION BY NUCLEIC ACID (DNA OR RNA); SEVERE ACUTE RESPIRATORY SYNDROME CORONAVIRUS 2 (SARS-COV-2) (CORONAVIRUS DISEASE [COVID-19]), AMPLIFIED PROBE TECHNIQUE, MAKING USE OF HIGH THROUGHPUT TECHNOLOGIES AS DESCRIBED BY CMS-2020-01-R: HCPCS | Performed by: INTERNAL MEDICINE

## 2021-08-06 LAB
SARS-COV-2 RNA RESP QL NAA+PROBE: NOT DETECTED
SARS-COV-2- CYCLE NUMBER: -1

## 2021-08-20 RX ORDER — ALPRAZOLAM 0.5 MG/1
0.5 TABLET ORAL NIGHTLY PRN
Qty: 30 TABLET | Refills: 3 | Status: SHIPPED | OUTPATIENT
Start: 2021-08-20 | End: 2021-12-07 | Stop reason: SDUPTHER

## 2021-10-05 DIAGNOSIS — I10 HYPERTENSION, BENIGN: ICD-10-CM

## 2021-10-05 RX ORDER — CARVEDILOL 12.5 MG/1
12.5 TABLET ORAL 2 TIMES DAILY WITH MEALS
Qty: 180 TABLET | Refills: 0 | Status: SHIPPED | OUTPATIENT
Start: 2021-10-05 | End: 2021-12-07 | Stop reason: SDUPTHER

## 2021-10-05 RX ORDER — AMLODIPINE BESYLATE 10 MG/1
10 TABLET ORAL DAILY
Qty: 90 TABLET | Refills: 0 | Status: SHIPPED | OUTPATIENT
Start: 2021-10-05 | End: 2021-12-07 | Stop reason: SDUPTHER

## 2021-10-12 ENCOUNTER — IMMUNIZATION (OUTPATIENT)
Dept: OBSTETRICS AND GYNECOLOGY | Facility: CLINIC | Age: 81
End: 2021-10-12
Payer: MEDICAID

## 2021-10-12 DIAGNOSIS — Z23 NEED FOR VACCINATION: Primary | ICD-10-CM

## 2021-10-12 PROCEDURE — 91300 COVID-19, MRNA, LNP-S, PF, 30 MCG/0.3 ML DOSE VACCINE: CPT | Mod: HCNC,PBBFAC | Performed by: FAMILY MEDICINE

## 2021-10-12 PROCEDURE — 0003A COVID-19, MRNA, LNP-S, PF, 30 MCG/0.3 ML DOSE VACCINE: CPT | Mod: HCNC,PBBFAC | Performed by: FAMILY MEDICINE

## 2021-11-02 ENCOUNTER — TELEPHONE (OUTPATIENT)
Dept: FAMILY MEDICINE | Facility: CLINIC | Age: 81
End: 2021-11-02
Payer: MEDICARE

## 2021-11-02 DIAGNOSIS — M54.50 CHRONIC MIDLINE LOW BACK PAIN WITHOUT SCIATICA: ICD-10-CM

## 2021-11-02 DIAGNOSIS — G89.29 CHRONIC MIDLINE LOW BACK PAIN WITHOUT SCIATICA: ICD-10-CM

## 2021-11-02 DIAGNOSIS — I10 HYPERTENSION, BENIGN: ICD-10-CM

## 2021-11-02 RX ORDER — OLMESARTAN MEDOXOMIL 40 MG/1
40 TABLET ORAL DAILY
Qty: 90 TABLET | Refills: 1 | Status: SHIPPED | OUTPATIENT
Start: 2021-11-02 | End: 2021-12-07 | Stop reason: SDUPTHER

## 2021-11-30 ENCOUNTER — LAB VISIT (OUTPATIENT)
Dept: LAB | Facility: HOSPITAL | Age: 81
End: 2021-11-30
Attending: FAMILY MEDICINE
Payer: MEDICAID

## 2021-11-30 DIAGNOSIS — I10 HYPERTENSION, BENIGN: ICD-10-CM

## 2021-11-30 LAB
ALBUMIN/CREAT UR: 4.6 UG/MG (ref 0–30)
CREAT UR-MCNC: 173 MG/DL (ref 23–375)
MICROALBUMIN UR DL<=1MG/L-MCNC: 8 UG/ML

## 2021-11-30 PROCEDURE — 82570 ASSAY OF URINE CREATININE: CPT | Mod: HCNC | Performed by: FAMILY MEDICINE

## 2021-12-05 ENCOUNTER — PATIENT MESSAGE (OUTPATIENT)
Dept: FAMILY MEDICINE | Facility: CLINIC | Age: 81
End: 2021-12-05
Payer: MEDICARE

## 2021-12-07 ENCOUNTER — OFFICE VISIT (OUTPATIENT)
Dept: FAMILY MEDICINE | Facility: CLINIC | Age: 81
End: 2021-12-07
Payer: MEDICARE

## 2021-12-07 VITALS
OXYGEN SATURATION: 99 % | HEIGHT: 68 IN | SYSTOLIC BLOOD PRESSURE: 162 MMHG | BODY MASS INDEX: 27.19 KG/M2 | HEART RATE: 63 BPM | RESPIRATION RATE: 18 BRPM | WEIGHT: 179.44 LBS | TEMPERATURE: 98 F | DIASTOLIC BLOOD PRESSURE: 80 MMHG

## 2021-12-07 DIAGNOSIS — N40.1 BENIGN PROSTATIC HYPERPLASIA WITH NOCTURIA: ICD-10-CM

## 2021-12-07 DIAGNOSIS — G89.29 CHRONIC MIDLINE LOW BACK PAIN WITHOUT SCIATICA: ICD-10-CM

## 2021-12-07 DIAGNOSIS — D64.9 NORMOCYTIC ANEMIA: ICD-10-CM

## 2021-12-07 DIAGNOSIS — M54.16 LUMBAR RADICULOPATHY: ICD-10-CM

## 2021-12-07 DIAGNOSIS — M54.50 CHRONIC MIDLINE LOW BACK PAIN WITHOUT SCIATICA: ICD-10-CM

## 2021-12-07 DIAGNOSIS — R14.3 EXCESSIVE GAS: ICD-10-CM

## 2021-12-07 DIAGNOSIS — R35.1 BENIGN PROSTATIC HYPERPLASIA WITH NOCTURIA: ICD-10-CM

## 2021-12-07 DIAGNOSIS — I10 HYPERTENSION, BENIGN: Primary | ICD-10-CM

## 2021-12-07 DIAGNOSIS — F41.9 ANXIETY: ICD-10-CM

## 2021-12-07 DIAGNOSIS — M51.36 DEGENERATIVE DISC DISEASE, LUMBAR: ICD-10-CM

## 2021-12-07 PROCEDURE — 99214 PR OFFICE/OUTPT VISIT, EST, LEVL IV, 30-39 MIN: ICD-10-PCS | Mod: HCNC,S$GLB,, | Performed by: FAMILY MEDICINE

## 2021-12-07 PROCEDURE — 99499 UNLISTED E&M SERVICE: CPT | Mod: HCNC,S$GLB,, | Performed by: FAMILY MEDICINE

## 2021-12-07 PROCEDURE — 99214 OFFICE O/P EST MOD 30 MIN: CPT | Mod: HCNC,S$GLB,, | Performed by: FAMILY MEDICINE

## 2021-12-07 PROCEDURE — 3079F PR MOST RECENT DIASTOLIC BLOOD PRESSURE 80-89 MM HG: ICD-10-PCS | Mod: CPTII,,, | Performed by: FAMILY MEDICINE

## 2021-12-07 PROCEDURE — 99999 PR PBB SHADOW E&M-EST. PATIENT-LVL III: ICD-10-PCS | Mod: PBBFAC,HCNC,, | Performed by: FAMILY MEDICINE

## 2021-12-07 PROCEDURE — 3077F PR MOST RECENT SYSTOLIC BLOOD PRESSURE >= 140 MM HG: ICD-10-PCS | Mod: CPTII,,, | Performed by: FAMILY MEDICINE

## 2021-12-07 PROCEDURE — 99213 OFFICE O/P EST LOW 20 MIN: CPT | Mod: PBBFAC,PN | Performed by: FAMILY MEDICINE

## 2021-12-07 PROCEDURE — 1159F MED LIST DOCD IN RCRD: CPT | Mod: CPTII,,, | Performed by: FAMILY MEDICINE

## 2021-12-07 PROCEDURE — 99499 RISK ADDL DX/OHS AUDIT: ICD-10-PCS | Mod: HCNC,S$GLB,, | Performed by: FAMILY MEDICINE

## 2021-12-07 PROCEDURE — 1160F RVW MEDS BY RX/DR IN RCRD: CPT | Mod: CPTII,,, | Performed by: FAMILY MEDICINE

## 2021-12-07 PROCEDURE — 3288F PR FALLS RISK ASSESSMENT DOCUMENTED: ICD-10-PCS | Mod: CPTII,,, | Performed by: FAMILY MEDICINE

## 2021-12-07 PROCEDURE — 1160F PR REVIEW ALL MEDS BY PRESCRIBER/CLIN PHARMACIST DOCUMENTED: ICD-10-PCS | Mod: CPTII,,, | Performed by: FAMILY MEDICINE

## 2021-12-07 PROCEDURE — 1159F PR MEDICATION LIST DOCUMENTED IN MEDICAL RECORD: ICD-10-PCS | Mod: CPTII,,, | Performed by: FAMILY MEDICINE

## 2021-12-07 PROCEDURE — 1101F PR PT FALLS ASSESS DOC 0-1 FALLS W/OUT INJ PAST YR: ICD-10-PCS | Mod: CPTII,,, | Performed by: FAMILY MEDICINE

## 2021-12-07 PROCEDURE — 99999 PR PBB SHADOW E&M-EST. PATIENT-LVL III: CPT | Mod: PBBFAC,HCNC,, | Performed by: FAMILY MEDICINE

## 2021-12-07 PROCEDURE — 1101F PT FALLS ASSESS-DOCD LE1/YR: CPT | Mod: CPTII,,, | Performed by: FAMILY MEDICINE

## 2021-12-07 PROCEDURE — 3288F FALL RISK ASSESSMENT DOCD: CPT | Mod: CPTII,,, | Performed by: FAMILY MEDICINE

## 2021-12-07 PROCEDURE — 3077F SYST BP >= 140 MM HG: CPT | Mod: CPTII,,, | Performed by: FAMILY MEDICINE

## 2021-12-07 PROCEDURE — 3079F DIAST BP 80-89 MM HG: CPT | Mod: CPTII,,, | Performed by: FAMILY MEDICINE

## 2021-12-07 RX ORDER — ALPRAZOLAM 0.5 MG/1
0.5 TABLET ORAL NIGHTLY PRN
Qty: 30 TABLET | Refills: 3 | Status: SHIPPED | OUTPATIENT
Start: 2021-12-07 | End: 2022-04-15 | Stop reason: SDUPTHER

## 2021-12-07 RX ORDER — BUSPIRONE HYDROCHLORIDE 15 MG/1
15 TABLET ORAL 3 TIMES DAILY
Qty: 270 TABLET | Refills: 1 | Status: SHIPPED | OUTPATIENT
Start: 2021-12-07 | End: 2022-06-24 | Stop reason: SDUPTHER

## 2021-12-07 RX ORDER — GABAPENTIN 300 MG/1
300 CAPSULE ORAL 3 TIMES DAILY
Qty: 270 CAPSULE | Refills: 1 | Status: SHIPPED | OUTPATIENT
Start: 2021-12-07 | End: 2022-06-03

## 2021-12-07 RX ORDER — OLMESARTAN MEDOXOMIL 40 MG/1
40 TABLET ORAL DAILY
Qty: 90 TABLET | Refills: 1 | Status: SHIPPED | OUTPATIENT
Start: 2021-12-07 | End: 2022-10-21

## 2021-12-07 RX ORDER — SIMETHICONE 80 MG
80 TABLET,CHEWABLE ORAL EVERY 6 HOURS PRN
Qty: 30 TABLET | Refills: 2 | Status: SHIPPED | OUTPATIENT
Start: 2021-12-07 | End: 2022-11-22

## 2021-12-07 RX ORDER — AMLODIPINE BESYLATE 10 MG/1
10 TABLET ORAL DAILY
Qty: 90 TABLET | Refills: 1 | Status: SHIPPED | OUTPATIENT
Start: 2021-12-07 | End: 2022-06-27 | Stop reason: SDUPTHER

## 2021-12-07 RX ORDER — CARVEDILOL 12.5 MG/1
12.5 TABLET ORAL 2 TIMES DAILY WITH MEALS
Qty: 180 TABLET | Refills: 1 | Status: SHIPPED | OUTPATIENT
Start: 2021-12-07 | End: 2022-06-27 | Stop reason: SDUPTHER

## 2021-12-07 RX ORDER — DOXAZOSIN 2 MG/1
2 TABLET ORAL NIGHTLY
Qty: 30 TABLET | Refills: 0 | Status: SHIPPED | OUTPATIENT
Start: 2021-12-07 | End: 2021-12-14 | Stop reason: SDUPTHER

## 2021-12-14 ENCOUNTER — CLINICAL SUPPORT (OUTPATIENT)
Dept: FAMILY MEDICINE | Facility: CLINIC | Age: 81
End: 2021-12-14
Payer: MEDICARE

## 2021-12-14 ENCOUNTER — LAB VISIT (OUTPATIENT)
Dept: LAB | Facility: HOSPITAL | Age: 81
End: 2021-12-14
Attending: FAMILY MEDICINE
Payer: MEDICARE

## 2021-12-14 VITALS — DIASTOLIC BLOOD PRESSURE: 68 MMHG | SYSTOLIC BLOOD PRESSURE: 120 MMHG | HEART RATE: 58 BPM

## 2021-12-14 DIAGNOSIS — N40.1 BENIGN PROSTATIC HYPERPLASIA WITH NOCTURIA: ICD-10-CM

## 2021-12-14 DIAGNOSIS — R35.1 BENIGN PROSTATIC HYPERPLASIA WITH NOCTURIA: ICD-10-CM

## 2021-12-14 DIAGNOSIS — D64.9 NORMOCYTIC ANEMIA: ICD-10-CM

## 2021-12-14 LAB
BASOPHILS # BLD AUTO: 0.02 K/UL (ref 0–0.2)
BASOPHILS NFR BLD: 0.4 % (ref 0–1.9)
DIFFERENTIAL METHOD: ABNORMAL
EOSINOPHIL # BLD AUTO: 0.1 K/UL (ref 0–0.5)
EOSINOPHIL NFR BLD: 2 % (ref 0–8)
ERYTHROCYTE [DISTWIDTH] IN BLOOD BY AUTOMATED COUNT: 13.2 % (ref 11.5–14.5)
FERRITIN SERPL-MCNC: 105 NG/ML (ref 20–300)
HCT VFR BLD AUTO: 37.6 % (ref 40–54)
HGB BLD-MCNC: 12.4 G/DL (ref 14–18)
IMM GRANULOCYTES # BLD AUTO: 0.01 K/UL (ref 0–0.04)
IMM GRANULOCYTES NFR BLD AUTO: 0.2 % (ref 0–0.5)
IRON SERPL-MCNC: 117 UG/DL (ref 45–160)
LYMPHOCYTES # BLD AUTO: 1.4 K/UL (ref 1–4.8)
LYMPHOCYTES NFR BLD: 30.4 % (ref 18–48)
MCH RBC QN AUTO: 31.6 PG (ref 27–31)
MCHC RBC AUTO-ENTMCNC: 33 G/DL (ref 32–36)
MCV RBC AUTO: 96 FL (ref 82–98)
MONOCYTES # BLD AUTO: 0.5 K/UL (ref 0.3–1)
MONOCYTES NFR BLD: 11.9 % (ref 4–15)
NEUTROPHILS # BLD AUTO: 2.5 K/UL (ref 1.8–7.7)
NEUTROPHILS NFR BLD: 55.1 % (ref 38–73)
NRBC BLD-RTO: 0 /100 WBC
PLATELET # BLD AUTO: 143 K/UL (ref 150–450)
PMV BLD AUTO: 13.1 FL (ref 9.2–12.9)
RBC # BLD AUTO: 3.92 M/UL (ref 4.6–6.2)
SATURATED IRON: 38 % (ref 20–50)
TOTAL IRON BINDING CAPACITY: 311 UG/DL (ref 250–450)
TRANSFERRIN SERPL-MCNC: 210 MG/DL (ref 200–375)
TSH SERPL DL<=0.005 MIU/L-ACNC: 1.29 UIU/ML (ref 0.4–4)
WBC # BLD AUTO: 4.54 K/UL (ref 3.9–12.7)

## 2021-12-14 PROCEDURE — 82728 ASSAY OF FERRITIN: CPT | Performed by: FAMILY MEDICINE

## 2021-12-14 PROCEDURE — 99999 PR PBB SHADOW E&M-EST. PATIENT-LVL I: CPT | Mod: PBBFAC,,,

## 2021-12-14 PROCEDURE — 84466 ASSAY OF TRANSFERRIN: CPT | Performed by: FAMILY MEDICINE

## 2021-12-14 PROCEDURE — 36415 COLL VENOUS BLD VENIPUNCTURE: CPT | Mod: PN | Performed by: FAMILY MEDICINE

## 2021-12-14 PROCEDURE — 99999 PR PBB SHADOW E&M-EST. PATIENT-LVL I: ICD-10-PCS | Mod: PBBFAC,,,

## 2021-12-14 PROCEDURE — 85025 COMPLETE CBC W/AUTO DIFF WBC: CPT | Performed by: FAMILY MEDICINE

## 2021-12-14 PROCEDURE — 99211 OFF/OP EST MAY X REQ PHY/QHP: CPT | Mod: PBBFAC,PN

## 2021-12-14 PROCEDURE — 99499 NO LOS: ICD-10-PCS | Mod: S$PBB,HCNC,, | Performed by: FAMILY MEDICINE

## 2021-12-14 PROCEDURE — 84443 ASSAY THYROID STIM HORMONE: CPT | Performed by: FAMILY MEDICINE

## 2021-12-14 PROCEDURE — 99499 UNLISTED E&M SERVICE: CPT | Mod: S$PBB,HCNC,, | Performed by: FAMILY MEDICINE

## 2021-12-14 RX ORDER — DOXAZOSIN 2 MG/1
2 TABLET ORAL NIGHTLY
Qty: 90 TABLET | Refills: 1 | Status: SHIPPED | OUTPATIENT
Start: 2021-12-14 | End: 2022-01-10 | Stop reason: SDUPTHER

## 2021-12-14 RX ORDER — OMEPRAZOLE 40 MG/1
40 CAPSULE, DELAYED RELEASE ORAL DAILY
Qty: 90 CAPSULE | Refills: 1 | Status: SHIPPED | OUTPATIENT
Start: 2021-12-14 | End: 2022-06-09

## 2021-12-20 DIAGNOSIS — I10 HYPERTENSION, BENIGN: Primary | ICD-10-CM

## 2021-12-21 ENCOUNTER — TELEPHONE (OUTPATIENT)
Dept: FAMILY MEDICINE | Facility: CLINIC | Age: 81
End: 2021-12-21
Payer: MEDICARE

## 2021-12-31 ENCOUNTER — PATIENT MESSAGE (OUTPATIENT)
Dept: ADMINISTRATIVE | Facility: OTHER | Age: 81
End: 2021-12-31
Payer: MEDICARE

## 2021-12-31 ENCOUNTER — LAB VISIT (OUTPATIENT)
Dept: PRIMARY CARE CLINIC | Facility: OTHER | Age: 81
End: 2021-12-31
Attending: INTERNAL MEDICINE
Payer: MEDICARE

## 2021-12-31 DIAGNOSIS — Z20.822 ENCOUNTER FOR LABORATORY TESTING FOR COVID-19 VIRUS: ICD-10-CM

## 2021-12-31 PROCEDURE — U0003 INFECTIOUS AGENT DETECTION BY NUCLEIC ACID (DNA OR RNA); SEVERE ACUTE RESPIRATORY SYNDROME CORONAVIRUS 2 (SARS-COV-2) (CORONAVIRUS DISEASE [COVID-19]), AMPLIFIED PROBE TECHNIQUE, MAKING USE OF HIGH THROUGHPUT TECHNOLOGIES AS DESCRIBED BY CMS-2020-01-R: HCPCS | Mod: ST72 | Performed by: INTERNAL MEDICINE

## 2022-01-02 ENCOUNTER — PATIENT MESSAGE (OUTPATIENT)
Dept: FAMILY MEDICINE | Facility: CLINIC | Age: 82
End: 2022-01-02
Payer: MEDICARE

## 2022-01-03 ENCOUNTER — PATIENT MESSAGE (OUTPATIENT)
Dept: ADMINISTRATIVE | Facility: OTHER | Age: 82
End: 2022-01-03
Payer: MEDICARE

## 2022-01-03 ENCOUNTER — NURSE TRIAGE (OUTPATIENT)
Dept: ADMINISTRATIVE | Facility: CLINIC | Age: 82
End: 2022-01-03
Payer: MEDICARE

## 2022-01-03 NOTE — TELEPHONE ENCOUNTER
I did not do any testing for COVID on him. It appears he went through testing center, and he will get the results from them once available. On the computer it says it is still processing

## 2022-01-03 NOTE — TELEPHONE ENCOUNTER
Reason for Disposition   Lab result questions   Caller requesting lab results    Protocols used: INFORMATION ONLY CALL - NO TRIAGE-A-AH, PCP CALL - NO TRIAGE-A-AH    Patient's daughter calling for status on covid test and it is still in processing. Informed her we cannot discuss results but the pcp can.

## 2022-01-04 DIAGNOSIS — U07.1 COVID-19 VIRUS DETECTED: ICD-10-CM

## 2022-01-04 LAB
SARS-COV-2 RNA RESP QL NAA+PROBE: DETECTED
SARS-COV-2- CYCLE NUMBER: 14

## 2022-01-14 ENCOUNTER — TELEPHONE (OUTPATIENT)
Dept: FAMILY MEDICINE | Facility: CLINIC | Age: 82
End: 2022-01-14
Payer: MEDICARE

## 2022-01-14 NOTE — TELEPHONE ENCOUNTER
Please call patient and pharmacy. I had told patient to stop Tamsulosin as Cardura would help with the prostate and the blood pressure- he was supposed to stop the tamsulosin

## 2022-01-14 NOTE — TELEPHONE ENCOUNTER
----- Message from Reena Green MA sent at 1/7/2022  2:31 PM CST -----  Contact: [facility] [phone]  Please Advise   ----- Message -----  From: Joyce Inman  Sent: 1/7/2022   2:25 PM CST  To: Arnaldo Lilly Staff    Type:  Pharmacy Calling to Clarify an RX    Name of Caller: Min    Pharmacy [facility]: [facility]    Prescription Name:  doxazosin (CARDURA) 2 MG tablet    What do they need to clarify? Medication requires a Prior Authorization, due to Dr Labadie requesting Tamsulosin.     Best Call Back Number:  [phone]

## 2022-03-03 ENCOUNTER — TELEPHONE (OUTPATIENT)
Dept: FAMILY MEDICINE | Facility: CLINIC | Age: 82
End: 2022-03-03
Payer: MEDICARE

## 2022-03-03 DIAGNOSIS — G89.29 CHRONIC MIDLINE LOW BACK PAIN WITHOUT SCIATICA: ICD-10-CM

## 2022-03-03 DIAGNOSIS — M19.90 ARTHRITIS: Primary | ICD-10-CM

## 2022-03-03 DIAGNOSIS — M54.50 CHRONIC MIDLINE LOW BACK PAIN WITHOUT SCIATICA: ICD-10-CM

## 2022-03-03 RX ORDER — MELOXICAM 7.5 MG/1
TABLET ORAL
Qty: 30 TABLET | Refills: 2 | Status: SHIPPED | OUTPATIENT
Start: 2022-03-03 | End: 2022-03-29 | Stop reason: SDUPTHER

## 2022-03-03 NOTE — TELEPHONE ENCOUNTER
Patient came in today during his wife's appointment. He requested to make an appointment with rheumatology for joint pains. He had not seen rheumatology previously, but stated he would prefer to see a rheumatologist

## 2022-03-29 DIAGNOSIS — G89.29 CHRONIC MIDLINE LOW BACK PAIN WITHOUT SCIATICA: ICD-10-CM

## 2022-03-29 DIAGNOSIS — M54.50 CHRONIC MIDLINE LOW BACK PAIN WITHOUT SCIATICA: ICD-10-CM

## 2022-03-29 RX ORDER — MELOXICAM 7.5 MG/1
TABLET ORAL
Qty: 30 TABLET | Refills: 2 | Status: SHIPPED | OUTPATIENT
Start: 2022-03-29 | End: 2022-09-15 | Stop reason: SDUPTHER

## 2022-03-31 NOTE — TELEPHONE ENCOUNTER
No new care gaps identified.  Powered by [employer]. Reference number: [id number].   3/31/2022 3:19:51 PM CDT

## 2022-04-01 RX ORDER — FLUTICASONE PROPIONATE 50 MCG
SPRAY, SUSPENSION (ML) NASAL
Qty: 16 G | Refills: 2 | Status: SHIPPED | OUTPATIENT
Start: 2022-04-01 | End: 2022-04-15 | Stop reason: SDUPTHER

## 2022-04-18 DIAGNOSIS — F41.9 ANXIETY: ICD-10-CM

## 2022-04-18 NOTE — TELEPHONE ENCOUNTER
No new care gaps identified.  Powered by [employer]. Reference number: [id number].   4/18/2022 8:38:25 AM CDT

## 2022-04-19 RX ORDER — FLUTICASONE PROPIONATE 50 MCG
SPRAY, SUSPENSION (ML) NASAL
Qty: 16 G | Refills: 2 | OUTPATIENT
Start: 2022-04-19

## 2022-04-19 RX ORDER — ALPRAZOLAM 0.5 MG/1
0.5 TABLET ORAL NIGHTLY PRN
Qty: 30 TABLET | Refills: 3 | OUTPATIENT
Start: 2022-04-19

## 2022-05-13 ENCOUNTER — IMMUNIZATION (OUTPATIENT)
Dept: OBSTETRICS AND GYNECOLOGY | Facility: CLINIC | Age: 82
End: 2022-05-13
Payer: MEDICARE

## 2022-05-13 DIAGNOSIS — Z23 NEED FOR VACCINATION: Primary | ICD-10-CM

## 2022-05-13 PROCEDURE — 91305 COVID-19, MRNA, LNP-S, PF, 30 MCG/0.3 ML DOSE VACCINE (PFIZER): CPT | Mod: PBBFAC

## 2022-06-03 DIAGNOSIS — M54.16 LUMBAR RADICULOPATHY: ICD-10-CM

## 2022-06-03 DIAGNOSIS — G89.29 CHRONIC MIDLINE LOW BACK PAIN WITHOUT SCIATICA: ICD-10-CM

## 2022-06-03 DIAGNOSIS — M51.36 DEGENERATIVE DISC DISEASE, LUMBAR: ICD-10-CM

## 2022-06-03 DIAGNOSIS — M54.50 CHRONIC MIDLINE LOW BACK PAIN WITHOUT SCIATICA: ICD-10-CM

## 2022-06-03 RX ORDER — GABAPENTIN 300 MG/1
CAPSULE ORAL
Qty: 270 CAPSULE | Refills: 1 | Status: SHIPPED | OUTPATIENT
Start: 2022-06-03 | End: 2022-11-22 | Stop reason: SDUPTHER

## 2022-06-03 NOTE — TELEPHONE ENCOUNTER
No new care gaps identified.  [facility] Embedded Care Gaps. Reference number: [id number]. 6/03/2022   3:14:05 AM SIGRIDT

## 2022-06-03 NOTE — TELEPHONE ENCOUNTER
Refill Routing Note   Medication(s) are not appropriate for processing by [facility] for the following reason(s):      - Outside of protocol    [facility] action(s):  Route          Medication reconciliation completed: No     Appointments  past 12m or future 3m with PCP    Date Provider   Last Visit   12/7/2021 Maxx Mcintosh Jr., MD   Next Visit   7/5/2022 Maxx Mcintosh Jr., MD   ED visits in past 90 days: 0        Note composed:9:56 AM 06/03/2022

## 2022-06-08 NOTE — TELEPHONE ENCOUNTER
No new care gaps identified.  [facility] Embedded Care Gaps. Reference number: [id number]. 6/08/2022   1:34:22 PM CDT

## 2022-06-08 NOTE — TELEPHONE ENCOUNTER
----- Message from Alley Little sent at 6/8/2022  1:25 PM CDT -----  Type: RX Refill Request    Who Called:  Ana Rosa Hua     Have you contacted your pharmacy: no     Refill or New Rx: Refill     RX Name and Strength:    omeprazole (PRILOSEC) 40 MG capsule      Preferred Pharmacy with phone number:    [id number], [place] - [address] AT [facility] [address]   Phone:  [phone]  Fax:  [phone]        Local or Mail Order:Local     Ordering Provider: Dr. Mcintosh    Would the patient rather a call back or a response via [facility]? Call     Best Call Back Number:...[phone]

## 2022-06-09 RX ORDER — OMEPRAZOLE 40 MG/1
40 CAPSULE, DELAYED RELEASE ORAL DAILY
Qty: 90 CAPSULE | Refills: 1 | OUTPATIENT
Start: 2022-06-09

## 2022-06-21 ENCOUNTER — HOSPITAL ENCOUNTER (OUTPATIENT)
Dept: RADIOLOGY | Facility: HOSPITAL | Age: 82
Discharge: HOME | End: 2022-06-21
Attending: ORTHOPAEDIC SURGERY
Payer: MEDICARE

## 2022-06-21 DIAGNOSIS — M54.16 LUMBAR RADICULOPATHY: ICD-10-CM

## 2022-06-21 DIAGNOSIS — M54.59 WEIGHT LIFTER'S BACK: ICD-10-CM

## 2022-06-21 PROCEDURE — 72148 MRI LUMBAR SPINE W/O DYE: CPT | Mod: 26,,, | Performed by: RADIOLOGY

## 2022-06-21 PROCEDURE — 72148 MRI LUMBAR SPINE W/O DYE: CPT | Mod: TC

## 2022-06-21 PROCEDURE — 72148 MRI LUMBAR SPINE WITHOUT CONTRAST: ICD-10-PCS | Mod: 26,,, | Performed by: RADIOLOGY

## 2022-06-23 ENCOUNTER — PATIENT MESSAGE (OUTPATIENT)
Dept: FAMILY MEDICINE | Facility: CLINIC | Age: 82
End: 2022-06-23
Payer: MEDICARE

## 2022-06-24 DIAGNOSIS — F41.9 ANXIETY: ICD-10-CM

## 2022-06-24 RX ORDER — BUSPIRONE HYDROCHLORIDE 15 MG/1
15 TABLET ORAL 3 TIMES DAILY
Qty: 270 TABLET | Refills: 0 | Status: SHIPPED | OUTPATIENT
Start: 2022-06-24 | End: 2022-09-13 | Stop reason: SDUPTHER

## 2022-06-27 DIAGNOSIS — I10 HYPERTENSION, BENIGN: ICD-10-CM

## 2022-06-27 NOTE — TELEPHONE ENCOUNTER
No new care gaps identified.  [facility] Embedded Care Gaps. Reference number: [id number]. 6/27/2022   10:52:41 AM SIGRIDT

## 2022-06-28 ENCOUNTER — LAB VISIT (OUTPATIENT)
Dept: LAB | Facility: HOSPITAL | Age: 82
End: 2022-06-28
Attending: FAMILY MEDICINE
Payer: MEDICARE

## 2022-06-28 DIAGNOSIS — I10 HYPERTENSION, BENIGN: ICD-10-CM

## 2022-06-28 LAB
ALBUMIN/CREAT UR: 13.6 UG/MG (ref 0–30)
CREAT UR-MCNC: 118 MG/DL (ref 23–375)
MICROALBUMIN UR DL<=1MG/L-MCNC: 16 UG/ML

## 2022-06-28 PROCEDURE — 82570 ASSAY OF URINE CREATININE: CPT | Performed by: FAMILY MEDICINE

## 2022-06-28 RX ORDER — AMLODIPINE BESYLATE 10 MG/1
10 TABLET ORAL DAILY
Qty: 90 TABLET | Refills: 1 | Status: SHIPPED | OUTPATIENT
Start: 2022-06-28 | End: 2022-06-29 | Stop reason: SDUPTHER

## 2022-06-28 RX ORDER — CARVEDILOL 12.5 MG/1
12.5 TABLET ORAL 2 TIMES DAILY WITH MEALS
Qty: 180 TABLET | Refills: 1 | Status: SHIPPED | OUTPATIENT
Start: 2022-06-28 | End: 2022-06-29 | Stop reason: SDUPTHER

## 2022-06-29 DIAGNOSIS — I10 HYPERTENSION, BENIGN: ICD-10-CM

## 2022-06-29 NOTE — TELEPHONE ENCOUNTER
No new care gaps identified.  [facility] Embedded Care Gaps. Reference number: [id number]. 6/29/2022   3:10:39 PM CDT

## 2022-07-01 RX ORDER — CARVEDILOL 12.5 MG/1
12.5 TABLET ORAL 2 TIMES DAILY WITH MEALS
Qty: 180 TABLET | Refills: 0 | Status: SHIPPED | OUTPATIENT
Start: 2022-07-01 | End: 2022-11-22 | Stop reason: SDUPTHER

## 2022-07-01 RX ORDER — AMLODIPINE BESYLATE 10 MG/1
10 TABLET ORAL DAILY
Qty: 90 TABLET | Refills: 0 | Status: SHIPPED | OUTPATIENT
Start: 2022-07-01 | End: 2022-11-22 | Stop reason: SDUPTHER

## 2022-07-05 ENCOUNTER — OFFICE VISIT (OUTPATIENT)
Dept: FAMILY MEDICINE | Facility: CLINIC | Age: 82
End: 2022-07-05
Payer: MEDICARE

## 2022-07-05 ENCOUNTER — LAB VISIT (OUTPATIENT)
Dept: LAB | Facility: HOSPITAL | Age: 82
End: 2022-07-05
Attending: FAMILY MEDICINE
Payer: MEDICARE

## 2022-07-05 VITALS
TEMPERATURE: 98 F | DIASTOLIC BLOOD PRESSURE: 78 MMHG | OXYGEN SATURATION: 97 % | HEIGHT: 68 IN | SYSTOLIC BLOOD PRESSURE: 150 MMHG | BODY MASS INDEX: 28.66 KG/M2 | RESPIRATION RATE: 18 BRPM | WEIGHT: 189.13 LBS | HEART RATE: 71 BPM

## 2022-07-05 DIAGNOSIS — K21.9 GASTROESOPHAGEAL REFLUX DISEASE WITHOUT ESOPHAGITIS: ICD-10-CM

## 2022-07-05 DIAGNOSIS — F41.9 ANXIETY: ICD-10-CM

## 2022-07-05 DIAGNOSIS — R35.1 BENIGN PROSTATIC HYPERPLASIA WITH NOCTURIA: ICD-10-CM

## 2022-07-05 DIAGNOSIS — I10 HYPERTENSION, BENIGN: Primary | ICD-10-CM

## 2022-07-05 DIAGNOSIS — M54.41 CHRONIC BILATERAL LOW BACK PAIN WITH BILATERAL SCIATICA: ICD-10-CM

## 2022-07-05 DIAGNOSIS — M54.42 CHRONIC BILATERAL LOW BACK PAIN WITH BILATERAL SCIATICA: ICD-10-CM

## 2022-07-05 DIAGNOSIS — D64.9 NORMOCYTIC ANEMIA: ICD-10-CM

## 2022-07-05 DIAGNOSIS — M48.061 SPINAL STENOSIS OF LUMBAR REGION, UNSPECIFIED WHETHER NEUROGENIC CLAUDICATION PRESENT: ICD-10-CM

## 2022-07-05 DIAGNOSIS — N40.1 BENIGN PROSTATIC HYPERPLASIA WITH NOCTURIA: ICD-10-CM

## 2022-07-05 DIAGNOSIS — D69.6 THROMBOCYTOPENIA: ICD-10-CM

## 2022-07-05 DIAGNOSIS — N28.1 BILATERAL RENAL CYSTS: ICD-10-CM

## 2022-07-05 DIAGNOSIS — M47.816 LUMBAR SPONDYLOSIS: ICD-10-CM

## 2022-07-05 DIAGNOSIS — G89.29 CHRONIC BILATERAL LOW BACK PAIN WITH BILATERAL SCIATICA: ICD-10-CM

## 2022-07-05 DIAGNOSIS — M51.36 DEGENERATIVE DISC DISEASE, LUMBAR: ICD-10-CM

## 2022-07-05 LAB
FERRITIN SERPL-MCNC: 151 NG/ML (ref 20–300)
IRON SERPL-MCNC: 90 UG/DL (ref 45–160)
RETICS/RBC NFR AUTO: 1.6 % (ref 0.4–2)
SATURATED IRON: 27 % (ref 20–50)
TOTAL IRON BINDING CAPACITY: 329 UG/DL (ref 250–450)
TRANSFERRIN SERPL-MCNC: 222 MG/DL (ref 200–375)
TSH SERPL DL<=0.005 MIU/L-ACNC: 0.67 UIU/ML (ref 0.4–4)

## 2022-07-05 PROCEDURE — 1101F PT FALLS ASSESS-DOCD LE1/YR: CPT | Mod: CPTII,S$GLB,, | Performed by: FAMILY MEDICINE

## 2022-07-05 PROCEDURE — 3288F FALL RISK ASSESSMENT DOCD: CPT | Mod: CPTII,S$GLB,, | Performed by: FAMILY MEDICINE

## 2022-07-05 PROCEDURE — 84443 ASSAY THYROID STIM HORMONE: CPT | Performed by: FAMILY MEDICINE

## 2022-07-05 PROCEDURE — 84630 ASSAY OF ZINC: CPT | Performed by: FAMILY MEDICINE

## 2022-07-05 PROCEDURE — 99215 OFFICE O/P EST HI 40 MIN: CPT | Mod: PBBFAC,PN | Performed by: FAMILY MEDICINE

## 2022-07-05 PROCEDURE — 3077F SYST BP >= 140 MM HG: CPT | Mod: CPTII,S$GLB,, | Performed by: FAMILY MEDICINE

## 2022-07-05 PROCEDURE — 1159F PR MEDICATION LIST DOCUMENTED IN MEDICAL RECORD: ICD-10-PCS | Mod: CPTII,S$GLB,, | Performed by: FAMILY MEDICINE

## 2022-07-05 PROCEDURE — 3078F DIAST BP <80 MM HG: CPT | Mod: CPTII,S$GLB,, | Performed by: FAMILY MEDICINE

## 2022-07-05 PROCEDURE — 3078F PR MOST RECENT DIASTOLIC BLOOD PRESSURE < 80 MM HG: ICD-10-PCS | Mod: CPTII,S$GLB,, | Performed by: FAMILY MEDICINE

## 2022-07-05 PROCEDURE — 1101F PR PT FALLS ASSESS DOC 0-1 FALLS W/OUT INJ PAST YR: ICD-10-PCS | Mod: CPTII,S$GLB,, | Performed by: FAMILY MEDICINE

## 2022-07-05 PROCEDURE — 36415 COLL VENOUS BLD VENIPUNCTURE: CPT | Mod: PN | Performed by: FAMILY MEDICINE

## 2022-07-05 PROCEDURE — 1160F PR REVIEW ALL MEDS BY PRESCRIBER/CLIN PHARMACIST DOCUMENTED: ICD-10-PCS | Mod: CPTII,S$GLB,, | Performed by: FAMILY MEDICINE

## 2022-07-05 PROCEDURE — 82728 ASSAY OF FERRITIN: CPT | Performed by: FAMILY MEDICINE

## 2022-07-05 PROCEDURE — 85045 AUTOMATED RETICULOCYTE COUNT: CPT | Performed by: FAMILY MEDICINE

## 2022-07-05 PROCEDURE — 99214 PR OFFICE/OUTPT VISIT, EST, LEVL IV, 30-39 MIN: ICD-10-PCS | Mod: S$GLB,,, | Performed by: FAMILY MEDICINE

## 2022-07-05 PROCEDURE — 84165 PATHOLOGIST INTERPRETATION SPE: ICD-10-PCS | Mod: 26,,, | Performed by: PATHOLOGY

## 2022-07-05 PROCEDURE — 99999 PR PBB SHADOW E&M-EST. PATIENT-LVL V: CPT | Mod: PBBFAC,,, | Performed by: FAMILY MEDICINE

## 2022-07-05 PROCEDURE — 84466 ASSAY OF TRANSFERRIN: CPT | Performed by: FAMILY MEDICINE

## 2022-07-05 PROCEDURE — 86704 HEP B CORE ANTIBODY TOTAL: CPT | Performed by: FAMILY MEDICINE

## 2022-07-05 PROCEDURE — 1160F RVW MEDS BY RX/DR IN RCRD: CPT | Mod: CPTII,S$GLB,, | Performed by: FAMILY MEDICINE

## 2022-07-05 PROCEDURE — 1159F MED LIST DOCD IN RCRD: CPT | Mod: CPTII,S$GLB,, | Performed by: FAMILY MEDICINE

## 2022-07-05 PROCEDURE — 86706 HEP B SURFACE ANTIBODY: CPT | Performed by: FAMILY MEDICINE

## 2022-07-05 PROCEDURE — 3077F PR MOST RECENT SYSTOLIC BLOOD PRESSURE >= 140 MM HG: ICD-10-PCS | Mod: CPTII,S$GLB,, | Performed by: FAMILY MEDICINE

## 2022-07-05 PROCEDURE — 99999 PR PBB SHADOW E&M-EST. PATIENT-LVL V: ICD-10-PCS | Mod: PBBFAC,,, | Performed by: FAMILY MEDICINE

## 2022-07-05 PROCEDURE — 84165 PROTEIN E-PHORESIS SERUM: CPT | Mod: 26,,, | Performed by: PATHOLOGY

## 2022-07-05 PROCEDURE — 1125F AMNT PAIN NOTED PAIN PRSNT: CPT | Mod: CPTII,S$GLB,, | Performed by: FAMILY MEDICINE

## 2022-07-05 PROCEDURE — 1125F PR PAIN SEVERITY QUANTIFIED, PAIN PRESENT: ICD-10-PCS | Mod: CPTII,S$GLB,, | Performed by: FAMILY MEDICINE

## 2022-07-05 PROCEDURE — 84165 PROTEIN E-PHORESIS SERUM: CPT | Performed by: FAMILY MEDICINE

## 2022-07-05 PROCEDURE — 87340 HEPATITIS B SURFACE AG IA: CPT | Performed by: FAMILY MEDICINE

## 2022-07-05 PROCEDURE — 3288F PR FALLS RISK ASSESSMENT DOCUMENTED: ICD-10-PCS | Mod: CPTII,S$GLB,, | Performed by: FAMILY MEDICINE

## 2022-07-05 PROCEDURE — 99214 OFFICE O/P EST MOD 30 MIN: CPT | Mod: S$GLB,,, | Performed by: FAMILY MEDICINE

## 2022-07-05 RX ORDER — DOXAZOSIN 2 MG/1
4 TABLET ORAL NIGHTLY
Start: 2022-07-05 | End: 2022-08-19 | Stop reason: SDUPTHER

## 2022-07-06 LAB
HBV CORE AB SERPL QL IA: POSITIVE
HBV SURFACE AB SER-ACNC: POSITIVE M[IU]/ML
HBV SURFACE AG SERPL QL IA: NEGATIVE

## 2022-07-07 LAB
ALBUMIN SERPL ELPH-MCNC: 4.3 G/DL (ref 3.35–5.55)
ALPHA1 GLOB SERPL ELPH-MCNC: 0.27 G/DL (ref 0.17–0.41)
ALPHA2 GLOB SERPL ELPH-MCNC: 0.78 G/DL (ref 0.43–0.99)
B-GLOBULIN SERPL ELPH-MCNC: 0.73 G/DL (ref 0.5–1.1)
GAMMA GLOB SERPL ELPH-MCNC: 0.73 G/DL (ref 0.67–1.58)
PATHOLOGIST INTERPRETATION SPE: NORMAL
PROT SERPL-MCNC: 6.8 G/DL (ref 6–8.4)

## 2022-07-11 NOTE — PROGRESS NOTES
Subjective:       Patient ID: Javid Daniel is a 81 y.o. male.    Chief Complaint: Hypertension, Anemia, and Back Pain    Hypertension  This is a chronic problem. The current episode started more than 1 year ago. The problem is uncontrolled. Pertinent negatives include no anxiety, blurred vision, chest pain, headaches, malaise/fatigue, orthopnea, palpitations, peripheral edema, PND, shortness of breath or sweats. Risk factors for coronary artery disease include male gender and sedentary lifestyle. Past treatments include beta blockers, central alpha agonists and calcium channel blockers. The current treatment provides moderate improvement. There are no compliance problems.    Anemia  Presents for follow-up visit. There has been no abdominal pain, anorexia, bruising/bleeding easily, confusion, fever, leg swelling, light-headedness, malaise/fatigue, pallor, palpitations, paresthesias, pica or weight loss. Signs of blood loss that are not present include hematemesis.   Back Pain  This is a chronic problem. The current episode started more than 1 year ago. Episode frequency: controlled with current regimen. Pertinent negatives include no abdominal pain, chest pain, fever, headaches, paresthesias or weight loss. Treatments tried: Seeing [facility]. Had MRI recently.   Anxiety  Presents for follow-up visit. Patient reports no chest pain, compulsions, confusion, decreased concentration, depressed mood, dizziness, dry mouth, excessive worry, feeling of choking, hyperventilation, irritability, muscle tension, nervous/anxious behavior, obsessions, palpitations or shortness of breath. Episode frequency: controlled with Buspar.     His past medical history is significant for anemia.     Review of Systems   Constitutional: Negative for fever, irritability, malaise/fatigue and weight loss.   Eyes: Negative for blurred vision.   Respiratory: Negative for shortness of breath.    Cardiovascular: Negative for chest  pain, palpitations, orthopnea and PND.   Gastrointestinal: Negative for abdominal pain, anorexia and hematemesis.   Musculoskeletal: Positive for back pain.   Integumentary:  Negative for pallor.   Neurological: Negative for dizziness, light-headedness, headaches and paresthesias.   Hematological: Does not bruise/bleed easily.   Psychiatric/Behavioral: Negative for confusion and decreased concentration. The patient is not nervous/anxious.          Objective:      Physical Exam  Vitals reviewed.   Constitutional:       General: He is not in acute distress.     Appearance: He is well-developed. He is not diaphoretic.   HENT:      Head: Normocephalic and atraumatic.      Right Ear: External ear normal.      Left Ear: External ear normal.      Nose: Nose normal.      Mouth/Throat:      Mouth: Mucous membranes are moist.   Eyes:      General:         Right eye: No discharge.         Left eye: No discharge.      Conjunctiva/sclera: Conjunctivae normal.      Pupils: Pupils are equal, round, and reactive to light.   Neck:      Thyroid: No thyromegaly.      Trachea: No tracheal deviation.   Cardiovascular:      Rate and Rhythm: Normal rate and regular rhythm.      Pulses:           Radial pulses are 2+ on the right side and 2+ on the left side.      Heart sounds: Normal heart sounds, S1 normal and S2 normal.   Pulmonary:      Effort: Pulmonary effort is normal. No respiratory distress.      Breath sounds: Normal breath sounds. No wheezing, rhonchi or rales.   Abdominal:      General: Bowel sounds are normal. There is no distension.      Palpations: Abdomen is soft. Abdomen is not rigid. There is no mass.      Tenderness: There is no abdominal tenderness. There is no guarding.   Musculoskeletal:      Cervical back: Normal range of motion and neck supple.   Lymphadenopathy:      Cervical: No cervical adenopathy.   Skin:     General: Skin is warm and dry.      Capillary Refill: Capillary refill takes less than 2 seconds.       Findings: No rash.   Neurological:      Mental Status: He is alert and oriented to person, place, and time.      Cranial Nerves: No cranial nerve deficit.      Sensory: No sensory deficit.      Motor: No atrophy or abnormal muscle tone.      Deep Tendon Reflexes:      Reflex Scores:       Patellar reflexes are 2+ on the right side and 2+ on the left side.  Psychiatric:         Behavior: Behavior normal.         Assessment:       Problem List Items Addressed This Visit    None     Visit Diagnoses     Hypertension, benign    -  Primary    Relevant Medications    doxazosin (CARDURA) 2 MG tablet    Normocytic anemia        Relevant Orders    Iron and TIBC (Completed)    Ferritin (Completed)    Reticulocytes (Completed)    Protein Electrophoresis, Serum (Completed)    TSH (Completed)    Hepatitis B Surface Antigen (Completed)    Hepatitis B Core Antibody, Total (Completed)    Hepatitis B Surface Ab, Qualitative (Completed)    ZINC    Thrombocytopenia        Relevant Orders    Iron and TIBC (Completed)    Ferritin (Completed)    Reticulocytes (Completed)    Protein Electrophoresis, Serum (Completed)    TSH (Completed)    Hepatitis B Surface Antigen (Completed)    Hepatitis B Core Antibody, Total (Completed)    Hepatitis B Surface Ab, Qualitative (Completed)    ZINC    Gastroesophageal reflux disease without esophagitis        Relevant Orders    Ambulatory referral/consult to Gastroenterology    Bilateral renal cysts        Relevant Orders    US Retroperitoneal Complete    Anxiety        Degenerative disc disease, lumbar        Lumbar spondylosis        Relevant Orders    WALKER FOR HOME USE    Spinal stenosis of lumbar region, unspecified whether neurogenic claudication present        Relevant Orders    WALKER FOR HOME USE    Chronic bilateral low back pain with bilateral sciatica        Relevant Orders    WALKER FOR HOME USE    Benign prostatic hyperplasia with nocturia        Relevant Medications    doxazosin (CARDURA)  2 MG tablet          Plan:       Javid was seen today for hypertension, anemia and back pain.    Diagnoses and all orders for this visit:    Hypertension, benign  -     doxazosin (CARDURA) 2 MG tablet; Take 2 tablets (4 mg total) by mouth every evening.  Increase Carduar to 4mg  Recheck BP in 2 weeks    Normocytic anemia/Thrombocytopenia  -     Iron and TIBC; Future  -     Ferritin; Future  -     Reticulocytes; Future  -     Protein Electrophoresis, Serum; Future  -     TSH; Future  -     Hepatitis B Surface Antigen; Future  -     Hepatitis B Core Antibody, Total; Future  -     Hepatitis B Surface Ab, Qualitative; Future  -     ZINC; Future  Check labs    Gastroesophageal reflux disease without esophagitis  -     Ambulatory referral/consult to Gastroenterology; Future  Patient requested referral to GI and this was placed    Bilateral renal cysts  -     US Retroperitoneal Complete; Future  Check Renal US    Anxiety  Continue current management    Degenerative disc disease, lumbar/ Lumbar spondylosis/Spinal stenosis of lumbar region, unspecified whether neurogenic claudication present/Chronic bilateral low back pain with bilateral sciatica  -     WALKER FOR HOME USE  Patient requested a rollator and Rx was sent in  Management by his orthopedist    Benign prostatic hyperplasia with nocturia  -     doxazosin (CARDURA) 2 MG tablet; Take 2 tablets (4 mg total) by mouth every evening.  Continue cardura

## 2022-07-12 ENCOUNTER — OFFICE VISIT (OUTPATIENT)
Dept: RHEUMATOLOGY | Facility: CLINIC | Age: 82
End: 2022-07-12
Payer: MEDICARE

## 2022-07-12 VITALS
SYSTOLIC BLOOD PRESSURE: 142 MMHG | DIASTOLIC BLOOD PRESSURE: 79 MMHG | OXYGEN SATURATION: 97 % | RESPIRATION RATE: 18 BRPM | WEIGHT: 183.19 LBS | BODY MASS INDEX: 27.76 KG/M2 | HEART RATE: 62 BPM | HEIGHT: 68 IN

## 2022-07-12 DIAGNOSIS — M15.9 PRIMARY OSTEOARTHRITIS INVOLVING MULTIPLE JOINTS: ICD-10-CM

## 2022-07-12 DIAGNOSIS — Z71.89 COUNSELING AND COORDINATION OF CARE: ICD-10-CM

## 2022-07-12 DIAGNOSIS — R76.8 RHEUMATOID FACTOR POSITIVE: Primary | ICD-10-CM

## 2022-07-12 PROCEDURE — 3288F FALL RISK ASSESSMENT DOCD: CPT | Mod: CPTII,S$GLB,, | Performed by: INTERNAL MEDICINE

## 2022-07-12 PROCEDURE — 3078F PR MOST RECENT DIASTOLIC BLOOD PRESSURE < 80 MM HG: ICD-10-PCS | Mod: CPTII,S$GLB,, | Performed by: INTERNAL MEDICINE

## 2022-07-12 PROCEDURE — 3077F PR MOST RECENT SYSTOLIC BLOOD PRESSURE >= 140 MM HG: ICD-10-PCS | Mod: CPTII,S$GLB,, | Performed by: INTERNAL MEDICINE

## 2022-07-12 PROCEDURE — 99204 PR OFFICE/OUTPT VISIT, NEW, LEVL IV, 45-59 MIN: ICD-10-PCS | Mod: S$GLB,,, | Performed by: INTERNAL MEDICINE

## 2022-07-12 PROCEDURE — 3077F SYST BP >= 140 MM HG: CPT | Mod: CPTII,S$GLB,, | Performed by: INTERNAL MEDICINE

## 2022-07-12 PROCEDURE — 1125F AMNT PAIN NOTED PAIN PRSNT: CPT | Mod: CPTII,S$GLB,, | Performed by: INTERNAL MEDICINE

## 2022-07-12 PROCEDURE — 1125F PR PAIN SEVERITY QUANTIFIED, PAIN PRESENT: ICD-10-PCS | Mod: CPTII,S$GLB,, | Performed by: INTERNAL MEDICINE

## 2022-07-12 PROCEDURE — 3288F PR FALLS RISK ASSESSMENT DOCUMENTED: ICD-10-PCS | Mod: CPTII,S$GLB,, | Performed by: INTERNAL MEDICINE

## 2022-07-12 PROCEDURE — 99999 PR PBB SHADOW E&M-EST. PATIENT-LVL IV: ICD-10-PCS | Mod: PBBFAC,,, | Performed by: INTERNAL MEDICINE

## 2022-07-12 PROCEDURE — 1159F MED LIST DOCD IN RCRD: CPT | Mod: CPTII,S$GLB,, | Performed by: INTERNAL MEDICINE

## 2022-07-12 PROCEDURE — 99204 OFFICE O/P NEW MOD 45 MIN: CPT | Mod: S$GLB,,, | Performed by: INTERNAL MEDICINE

## 2022-07-12 PROCEDURE — 1101F PR PT FALLS ASSESS DOC 0-1 FALLS W/OUT INJ PAST YR: ICD-10-PCS | Mod: CPTII,S$GLB,, | Performed by: INTERNAL MEDICINE

## 2022-07-12 PROCEDURE — 1101F PT FALLS ASSESS-DOCD LE1/YR: CPT | Mod: CPTII,S$GLB,, | Performed by: INTERNAL MEDICINE

## 2022-07-12 PROCEDURE — 1159F PR MEDICATION LIST DOCUMENTED IN MEDICAL RECORD: ICD-10-PCS | Mod: CPTII,S$GLB,, | Performed by: INTERNAL MEDICINE

## 2022-07-12 PROCEDURE — 3078F DIAST BP <80 MM HG: CPT | Mod: CPTII,S$GLB,, | Performed by: INTERNAL MEDICINE

## 2022-07-12 PROCEDURE — 99999 PR PBB SHADOW E&M-EST. PATIENT-LVL IV: CPT | Mod: PBBFAC,,, | Performed by: INTERNAL MEDICINE

## 2022-07-12 NOTE — PROGRESS NOTES
RHEUMATOLOGY OUTPATIENT CLINIC NOTE    7/12/2022    Attending Rheumatologist: Gustavo Vernon  Primary Care Provider: Maxx Mcintosh Jr, MD   Physician Requesting Consultation: Maxx Mcintosh Jr., MD  [address] [facility]  [place] [address]  Chief Complaint/Reason For Consultation:  Arthritis (Back pain )      Subjective:       HPI  Javid Daniel is a 81 y.o. Other male with medical history noted below who presents for evaluation of joint pain.     Patient presents for evaluation of joint pain. He had +RF. He notes FHx of arthritis. He notes chronic joint pain dating back less than 10 years . He notes his main issue is back pain, has upcoming injection tomorrow. He notes the pain is worse with activity, improves NSAIDs, Tylenol, rest, Norco. Pain at times radiates down his legs. Denies chronic pain in his hands, notes his 1st CMC area is growing. No swelling. No morning stiffness. No fatigue, wt loss, night sweats, or chills.     Review of Systems   Constitutional: Negative for chills, fatigue, fever and unexpected weight change.   HENT: Negative for mouth sores.    Eyes: Negative for redness and eye dryness.   Respiratory: Negative for cough and shortness of breath.    Cardiovascular: Negative for chest pain.   Gastrointestinal: Negative for abdominal distention, constipation, diarrhea, nausea, vomiting and reflux.   Musculoskeletal: Positive for arthralgias. Negative for back pain, gait problem, joint swelling, leg pain, myalgias, neck pain, neck stiffness and joint deformity.   Integumentary:  Negative for rash.   Neurological: Negative for weakness, numbness, headaches and memory loss.   Hematological: Negative for adenopathy. Does not bruise/bleed easily.   Psychiatric/Behavioral: Negative for confusion, decreased concentration, sleep disturbance and suicidal ideas. The patient is not nervous/anxious.    All other systems reviewed and are negative.       Chronic comorbid conditions affecting  medical decision making today:  Past Medical History:   Diagnosis Date    Anxiety     GERD (gastroesophageal reflux disease)     Hypertension     Nuclear sclerosis of both eyes 3/8/2021     No past surgical history on file.  Family History   Problem Relation Age of Onset    No Known Problems Mother     No Known Problems Father     No Known Problems Sister     No Known Problems Brother     No Known Problems Maternal Aunt     No Known Problems Maternal Uncle     No Known Problems Paternal Aunt     No Known Problems Paternal Uncle     No Known Problems Maternal Grandmother     No Known Problems Maternal Grandfather     No Known Problems Paternal Grandmother     No Known Problems Paternal Grandfather     Amblyopia Neg Hx     Blindness Neg Hx     Cancer Neg Hx     Cataracts Neg Hx     Diabetes Neg Hx     Glaucoma Neg Hx     Hypertension Neg Hx     Macular degeneration Neg Hx     Retinal detachment Neg Hx     Strabismus Neg Hx     Stroke Neg Hx     Thyroid disease Neg Hx      Social History     Substance and Sexual Activity   Alcohol Use Not Currently     Social History     Tobacco Use   Smoking Status Never Smoker   Smokeless Tobacco Never Used     Social History     Substance and Sexual Activity   Drug Use Never       Current Outpatient Medications:     ALPRAZolam (XANAX) 0.5 MG tablet, Take 1 tablet (0.5 mg total) by mouth nightly as needed for Insomnia., Disp: 30 tablet, Rfl: 3    amLODIPine (NORVASC) 10 MG tablet, Take 1 tablet (10 mg total) by mouth once daily., Disp: 90 tablet, Rfl: 0    busPIRone (BUSPAR) 15 MG tablet, Take 1 tablet (15 mg total) by mouth 3 (three) times daily., Disp: 270 tablet, Rfl: 0    carvediloL (COREG) 12.5 MG tablet, Take 1 tablet (12.5 mg total) by mouth 2 (two) times daily with meals., Disp: 180 tablet, Rfl: 0    cloNIDine (CATAPRES) 0.1 MG tablet, TAKE 1 TABLET(0.1 MG) BY MOUTH EVERY NIGHT AS NEEDED, Disp: 30 tablet, Rfl: 2    diclofenac sodium  (VOLTAREN) 1 % Gel, APPLY 3 TO 4 GRAMS ONTO THE AFFECTED AREA OF THE SKIN BID, Disp: , Rfl:     doxazosin (CARDURA) 2 MG tablet, Take 2 tablets (4 mg total) by mouth every evening., Disp: , Rfl:     fluticasone propionate (FLONASE) 50 mcg/actuation nasal spray, SHAKE LIQUID AND USE 2 SPRAYS(100 MCG) IN EACH NOSTRIL EVERY DAY, Disp: 16 g, Rfl: 2    gabapentin (NEURONTIN) 300 MG capsule, TAKE 1 CAPSULE(300 MG) BY MOUTH THREE TIMES DAILY, Disp: 270 capsule, Rfl: 1    meloxicam (MOBIC) 7.5 MG tablet, TAKE 1 TABLET(7.5 MG) BY MOUTH EVERY DAY, Disp: 30 tablet, Rfl: 2    olmesartan (BENICAR) 40 MG tablet, Take 1 tablet (40 mg total) by mouth once daily., Disp: 90 tablet, Rfl: 1    omeprazole (PRILOSEC) 40 MG capsule, TAKE 1 CAPSULE(40 MG) BY MOUTH EVERY DAY, Disp: 90 capsule, Rfl: 1    simethicone (MYLICON) 80 MG chewable tablet, Take 1 tablet (80 mg total) by mouth every 6 (six) hours as needed for Flatulence., Disp: 30 tablet, Rfl: 2     Objective:         Vitals:    07/12/22 0916   BP: (!) 142/79   Pulse: 62   Resp: 18     Physical Exam   Constitutional:   Uses Rolator    Musculoskeletal:      Comments: Can make fist, no synovitis  Bilateral 1st CMC TTP, [place] and cheng nodes  Knee crepitus  Negative ankle/MTP        Reviewed old and all outside pertinent medical records available.    All lab results personally reviewed and interpreted by me.  Lab Results   Component Value Date    WBC 4.00 06/28/2022    HGB 13.1 (L) 06/28/2022    HCT 39.5 (L) 06/28/2022    MCV 96 06/28/2022    MCH 31.7 (H) 06/28/2022    MCHC 33.2 06/28/2022    RDW 12.9 06/28/2022     (L) 06/28/2022    MPV 11.8 06/28/2022       Lab Results   Component Value Date     (L) 06/28/2022    K 5.0 06/28/2022    CL 96 06/28/2022    CO2 30 (H) 06/28/2022     06/28/2022    BUN 13 06/28/2022    CALCIUM 9.2 06/28/2022    PROT 7.1 06/28/2022    ALBUMIN 4.2 06/28/2022    BILITOT 1.4 (H) 06/28/2022    AST 16 06/28/2022    ALKPHOS 49 (L)  06/28/2022    ALT 11 06/28/2022       No results found for: COLORU, APPEARANCEUA, SPECGRAV, PHUR, PHUA, PROTEINUA, GLUCOSEU, KETONESU, BLOODU, LEUKOCYTESUR, NITRITE, UROBILINOGEN    No results found for: CRP    Lab Results   Component Value Date    SEDRATE 5 06/28/2022       Lab Results   Component Value Date    RF 18.0 (H) 06/28/2022    SEDRATE 5 06/28/2022       No components found for: 25OHVITDTOT, 02NXERVJ9, 68NAGNJA1, METHODNOTE    No results found for: URICACID    No components found for: TSPOTTB        Imaging:  All imaging reviewed and independently interpreted by me.         ASSESSMENT / PLAN:     Javid Daniel is a 81 y.o. Other male with:      1. Rheumatoid factor positive  - RF 19  - discussed results  - no evidence of RA  - no further work up     2. Primary osteoarthritis involving multiple joints  - joint pain 2/2 OA  - discussed diagnosis and management  - continue Tylenol PRN  - Uses Norco Occasionally, still on bottle from May 5th 2022  - following with Pain and Ortho  - reassurance and exercise     3. Other specified counseling  - over 10 minutes spent regarding below topics:  - Immunization counseling done.  - Weight loss counseling done.  - Nutrition and exercise counseling.  - Limitation of alcohol consumption.  - Regular exercise:  Aerobic and resistance.  - Medication counseling provided.      Follow up in about 6 months (around 1/12/2023).    Method of contact with patient concerns: [facility]    Disclaimer:  This note is prepared using voice recognition software and as such is likely to have errors and has not been proof read. Please contact me for questions.     Time spent: 45 minutes in face to face discussion concerning diagnosis, prognosis, review of lab and test results, benefits of treatment as well as management of disease, counseling of patient and coordination of care between various health care providers.  Greater than half the time spent was used for  coordination of care and counseling of patient.    Gustavo Vernon M.D.  Rheumatology Department   [facility]

## 2022-07-18 ENCOUNTER — CLINICAL SUPPORT (OUTPATIENT)
Dept: FAMILY MEDICINE | Facility: CLINIC | Age: 82
End: 2022-07-18
Payer: MEDICARE

## 2022-07-18 VITALS — HEART RATE: 64 BPM | SYSTOLIC BLOOD PRESSURE: 138 MMHG | DIASTOLIC BLOOD PRESSURE: 72 MMHG

## 2022-07-18 DIAGNOSIS — M17.0 PRIMARY OSTEOARTHRITIS OF BOTH KNEES: Primary | ICD-10-CM

## 2022-07-18 DIAGNOSIS — I10 HYPERTENSION, BENIGN: Primary | ICD-10-CM

## 2022-07-18 PROCEDURE — 99499 NO LOS: ICD-10-PCS | Mod: S$GLB,,, | Performed by: FAMILY MEDICINE

## 2022-07-18 PROCEDURE — 99999 PR PBB SHADOW E&M-EST. PATIENT-LVL II: CPT | Mod: PBBFAC,,,

## 2022-07-18 PROCEDURE — 99999 PR PBB SHADOW E&M-EST. PATIENT-LVL II: ICD-10-PCS | Mod: PBBFAC,,,

## 2022-07-18 PROCEDURE — 99499 UNLISTED E&M SERVICE: CPT | Mod: S$GLB,,, | Performed by: FAMILY MEDICINE

## 2022-07-18 RX ORDER — FLUTICASONE PROPIONATE 50 MCG
SPRAY, SUSPENSION (ML) NASAL
Qty: 16 G | Refills: 2 | Status: SHIPPED | OUTPATIENT
Start: 2022-07-18 | End: 2022-09-15 | Stop reason: SDUPTHER

## 2022-07-18 RX ORDER — DICLOFENAC SODIUM 20 MG/G
2 SOLUTION TOPICAL 2 TIMES DAILY
Qty: 112 G | Refills: 6 | Status: SHIPPED | OUTPATIENT
Start: 2022-07-18 | End: 2022-11-22

## 2022-07-18 NOTE — PROGRESS NOTES
.  Javid Daniel 81 y.o. male is here today for Blood Pressure check.   History of HTN yes.    Review of patient's allergies indicates:  No Known Allergies  Creatinine   Date Value Ref Range Status   06/28/2022 0.8 0.5 - 1.4 mg/dL Final   12/03/2019 0.9 mg/dL Final     Sodium   Date Value Ref Range Status   06/28/2022 135 (L) 136 - 145 mmol/L Final   12/03/2019 137  Final     Potassium   Date Value Ref Range Status   06/28/2022 5.0 3.5 - 5.1 mmol/L Final   12/03/2019 4.4  Final   ]  Patient verifies taking blood pressure medications on a regular basis at the same time of the day.     Current Outpatient Medications:     ALPRAZolam (XANAX) 0.5 MG tablet, Take 1 tablet (0.5 mg total) by mouth nightly as needed for Insomnia., Disp: 30 tablet, Rfl: 3    amLODIPine (NORVASC) 10 MG tablet, Take 1 tablet (10 mg total) by mouth once daily., Disp: 90 tablet, Rfl: 0    busPIRone (BUSPAR) 15 MG tablet, Take 1 tablet (15 mg total) by mouth 3 (three) times daily., Disp: 270 tablet, Rfl: 0    carvediloL (COREG) 12.5 MG tablet, Take 1 tablet (12.5 mg total) by mouth 2 (two) times daily with meals., Disp: 180 tablet, Rfl: 0    cloNIDine (CATAPRES) 0.1 MG tablet, TAKE 1 TABLET(0.1 MG) BY MOUTH EVERY NIGHT AS NEEDED, Disp: 30 tablet, Rfl: 2    diclofenac sodium (VOLTAREN) 1 % Gel, APPLY 3 TO 4 GRAMS ONTO THE AFFECTED AREA OF THE SKIN BID, Disp: , Rfl:     doxazosin (CARDURA) 2 MG tablet, Take 2 tablets (4 mg total) by mouth every evening., Disp: , Rfl:     fluticasone propionate (FLONASE) 50 mcg/actuation nasal spray, SHAKE LIQUID AND USE 2 SPRAYS(100 MCG) IN EACH NOSTRIL EVERY DAY, Disp: 16 g, Rfl: 2    gabapentin (NEURONTIN) 300 MG capsule, TAKE 1 CAPSULE(300 MG) BY MOUTH THREE TIMES DAILY, Disp: 270 capsule, Rfl: 1    meloxicam (MOBIC) 7.5 MG tablet, TAKE 1 TABLET(7.5 MG) BY MOUTH EVERY DAY, Disp: 30 tablet, Rfl: 2    olmesartan (BENICAR) 40 MG tablet, Take 1 tablet (40 mg total) by mouth once daily., Disp: 90  tablet, Rfl: 1    omeprazole (PRILOSEC) 40 MG capsule, TAKE 1 CAPSULE(40 MG) BY MOUTH EVERY DAY, Disp: 90 capsule, Rfl: 1    simethicone (MYLICON) 80 MG chewable tablet, Take 1 tablet (80 mg total) by mouth every 6 (six) hours as needed for Flatulence., Disp: 30 tablet, Rfl: 2  Does patient have record of home blood pressure readings no. Readings have been averaging n/a.   Last dose of blood pressure medication was taken at 07/18/2022 @ 0800.  Patient is asymptomatic.   Complains of n/a.    BP: (!) 148/72 , Pulse: 67 .    Blood pressure reading after 15 minutes was 138/72, Pulse 72.  Dr. Mcintosh notified.

## 2022-07-18 NOTE — TELEPHONE ENCOUNTER
No new care gaps identified.  [facility] Embedded Care Gaps. Reference number: [id number]. 7/18/2022   9:48:49 AM SIGRIDT

## 2022-07-21 LAB — ZINC SERPL-MCNC: 74 UG/DL (ref 60–130)

## 2022-08-11 DIAGNOSIS — F41.9 ANXIETY: ICD-10-CM

## 2022-08-11 NOTE — TELEPHONE ENCOUNTER
----- Message from Chitra Nuñez sent at 8/11/2022  4:53 PM CDT -----  Type: RX Refill Request    Who Called: alecia Hua    Have you contacted your pharmacy:no    Refill or New Rx:refill    RX Name and Strength:ALPRAZolam (XANAX) 0.5 MG tablet      Preferred Pharmacy with phone number:[facility] #[id number], [place] - [address] AT [facility] [address]   Phone:  [phone]  Fax:  [phone]    Local or Mail Order:local    Ordering Provider:Dr. Mcintosh    Would the patient rather a call back or a response via [facility]? call    Best Call Back Number:[phone] (home) [phone] (work)

## 2022-08-11 NOTE — TELEPHONE ENCOUNTER
No new care gaps identified.  [facility] Embedded Care Gaps. Reference number: [id number]. 8/11/2022   5:00:12 PM CDT

## 2022-08-15 RX ORDER — ALPRAZOLAM 0.5 MG/1
0.5 TABLET ORAL NIGHTLY PRN
Qty: 30 TABLET | Refills: 3 | Status: SHIPPED | OUTPATIENT
Start: 2022-08-15 | End: 2022-08-19 | Stop reason: SDUPTHER

## 2022-08-19 DIAGNOSIS — F41.9 ANXIETY: ICD-10-CM

## 2022-08-19 DIAGNOSIS — R35.1 BENIGN PROSTATIC HYPERPLASIA WITH NOCTURIA: ICD-10-CM

## 2022-08-19 DIAGNOSIS — I10 HYPERTENSION, BENIGN: ICD-10-CM

## 2022-08-19 DIAGNOSIS — N40.1 BENIGN PROSTATIC HYPERPLASIA WITH NOCTURIA: ICD-10-CM

## 2022-08-19 NOTE — TELEPHONE ENCOUNTER
----- Message from Chitra Nuñez sent at 8/19/2022  2:23 PM CDT -----  Type: RX Refill Request    Who Called: Marlyn    Have you contacted your pharmacy:yes    Refill or New Rx:refill    RX Name and Strength:doxazosin (CARDURA) 2 MG tablet  ALPRAZolam (XANAX) 0.5 MG tablet ( supposed too be increased to 180 for 990-day supply)      Preferred Pharmacy with phone number:[id number] [place] [facility] [address] AT [facility] [address]   Phone:  [phone]  Fax:  [phone]    Local or Mail Order:local    Ordering Provider:Dr. Mcintosh    Would the patient rather a call back or a response via My [facility]? call    Best Call Back Number:[phone] (M)   [phone] (home) [phone] (work)

## 2022-08-19 NOTE — TELEPHONE ENCOUNTER
No new care gaps identified.  [facility] Embedded Care Gaps. Reference number: [id number]. 8/19/2022   2:28:14 PM CDT

## 2022-08-19 NOTE — TELEPHONE ENCOUNTER
Last office visit 07/05/22- patient's daughter stated that he was supposed to get a 90-day supply for his xanax

## 2022-08-21 RX ORDER — ALPRAZOLAM 0.5 MG/1
0.5 TABLET ORAL NIGHTLY PRN
Qty: 90 TABLET | Refills: 1 | Status: SHIPPED | OUTPATIENT
Start: 2022-08-21 | End: 2022-11-22 | Stop reason: SDUPTHER

## 2022-08-21 RX ORDER — DOXAZOSIN 2 MG/1
4 TABLET ORAL NIGHTLY
Qty: 60 TABLET | Refills: 11
Start: 2022-08-21 | End: 2022-08-22

## 2022-08-22 ENCOUNTER — TELEPHONE (OUTPATIENT)
Dept: FAMILY MEDICINE | Facility: CLINIC | Age: 82
End: 2022-08-22
Payer: MEDICARE

## 2022-08-22 DIAGNOSIS — R35.1 BENIGN PROSTATIC HYPERPLASIA WITH NOCTURIA: ICD-10-CM

## 2022-08-22 DIAGNOSIS — I10 HYPERTENSION, BENIGN: ICD-10-CM

## 2022-08-22 DIAGNOSIS — N40.1 BENIGN PROSTATIC HYPERPLASIA WITH NOCTURIA: ICD-10-CM

## 2022-08-22 RX ORDER — DOXAZOSIN 4 MG/1
4 TABLET ORAL NIGHTLY
Qty: 90 TABLET | Refills: 1 | Status: SHIPPED | OUTPATIENT
Start: 2022-08-22 | End: 2022-11-22 | Stop reason: SDUPTHER

## 2022-08-22 NOTE — TELEPHONE ENCOUNTER
No new care gaps identified.  [facility] Embedded Care Gaps. Reference number: [id number]. 8/22/2022   5:33:30 PM CDT

## 2022-08-22 NOTE — TELEPHONE ENCOUNTER
----- Message from Effie Valle sent at 8/22/2022 11:21 AM CDT -----  Name of Who is Calling:[facility] pharmacy              What is the request in detail:Clarification on medicationdoxazosin (CARDURA) 2 MG tablet              Can the clinic reply by [facility]:no              What Number to Call Back if not in [facility]:[phone]

## 2022-08-22 NOTE — TELEPHONE ENCOUNTER
Called [facility] back , pharmacist says for insurance they need script for doxazosin  With needed instructions faxed over . They gave pt a emergency  fill over the weekend .

## 2022-08-27 ENCOUNTER — HOSPITAL ENCOUNTER (OUTPATIENT)
Dept: RADIOLOGY | Facility: HOSPITAL | Age: 82
Discharge: HOME | End: 2022-08-27
Attending: FAMILY MEDICINE
Payer: MEDICARE

## 2022-08-27 DIAGNOSIS — N28.1 BILATERAL RENAL CYSTS: ICD-10-CM

## 2022-08-27 PROCEDURE — 76770 US EXAM ABDO BACK WALL COMP: CPT | Mod: TC

## 2022-08-27 PROCEDURE — 76770 US RETROPERITONEAL COMPLETE: ICD-10-PCS | Mod: 26,,, | Performed by: RADIOLOGY

## 2022-08-27 PROCEDURE — 76770 US EXAM ABDO BACK WALL COMP: CPT | Mod: 26,,, | Performed by: RADIOLOGY

## 2022-08-28 DIAGNOSIS — N28.1 BILATERAL RENAL CYSTS: Primary | ICD-10-CM

## 2022-08-29 ENCOUNTER — TELEPHONE (OUTPATIENT)
Dept: GASTROENTEROLOGY | Facility: CLINIC | Age: 82
End: 2022-08-29
Payer: MEDICARE

## 2022-08-29 NOTE — TELEPHONE ENCOUNTER
----- Message from Sean Mendez sent at 8/29/2022  1:45 PM CDT -----  Name of Who is Calling: Velia (daughter)         What is the request in detail: Velia is calling to r.s the patient appointment to 09/02/22. please advise          Can the clinic reply by [facility]: no         What Number [facility] Back [facility]: [phone]

## 2022-09-01 ENCOUNTER — OFFICE VISIT (OUTPATIENT)
Dept: GASTROENTEROLOGY | Facility: CLINIC | Age: 82
End: 2022-09-01
Payer: MEDICARE

## 2022-09-01 VITALS
WEIGHT: 181.56 LBS | HEART RATE: 67 BPM | BODY MASS INDEX: 27.52 KG/M2 | DIASTOLIC BLOOD PRESSURE: 92 MMHG | SYSTOLIC BLOOD PRESSURE: 160 MMHG | HEIGHT: 68 IN

## 2022-09-01 DIAGNOSIS — K21.9 GASTROESOPHAGEAL REFLUX DISEASE WITHOUT ESOPHAGITIS: ICD-10-CM

## 2022-09-01 DIAGNOSIS — K30 INDIGESTION: Primary | ICD-10-CM

## 2022-09-01 PROCEDURE — 1126F AMNT PAIN NOTED NONE PRSNT: CPT | Mod: CPTII,S$GLB,, | Performed by: INTERNAL MEDICINE

## 2022-09-01 PROCEDURE — 3288F FALL RISK ASSESSMENT DOCD: CPT | Mod: CPTII,S$GLB,, | Performed by: INTERNAL MEDICINE

## 2022-09-01 PROCEDURE — 3288F PR FALLS RISK ASSESSMENT DOCUMENTED: ICD-10-PCS | Mod: CPTII,S$GLB,, | Performed by: INTERNAL MEDICINE

## 2022-09-01 PROCEDURE — 99999 PR PBB SHADOW E&M-EST. PATIENT-LVL IV: CPT | Mod: PBBFAC,,, | Performed by: INTERNAL MEDICINE

## 2022-09-01 PROCEDURE — 3077F PR MOST RECENT SYSTOLIC BLOOD PRESSURE >= 140 MM HG: ICD-10-PCS | Mod: CPTII,S$GLB,, | Performed by: INTERNAL MEDICINE

## 2022-09-01 PROCEDURE — 1159F MED LIST DOCD IN RCRD: CPT | Mod: CPTII,S$GLB,, | Performed by: INTERNAL MEDICINE

## 2022-09-01 PROCEDURE — 99203 PR OFFICE/OUTPT VISIT, NEW, LEVL III, 30-44 MIN: ICD-10-PCS | Mod: S$GLB,,, | Performed by: INTERNAL MEDICINE

## 2022-09-01 PROCEDURE — 99203 OFFICE O/P NEW LOW 30 MIN: CPT | Mod: S$GLB,,, | Performed by: INTERNAL MEDICINE

## 2022-09-01 PROCEDURE — 1160F RVW MEDS BY RX/DR IN RCRD: CPT | Mod: CPTII,S$GLB,, | Performed by: INTERNAL MEDICINE

## 2022-09-01 PROCEDURE — 3080F PR MOST RECENT DIASTOLIC BLOOD PRESSURE >= 90 MM HG: ICD-10-PCS | Mod: CPTII,S$GLB,, | Performed by: INTERNAL MEDICINE

## 2022-09-01 PROCEDURE — 3080F DIAST BP >= 90 MM HG: CPT | Mod: CPTII,S$GLB,, | Performed by: INTERNAL MEDICINE

## 2022-09-01 PROCEDURE — 1159F PR MEDICATION LIST DOCUMENTED IN MEDICAL RECORD: ICD-10-PCS | Mod: CPTII,S$GLB,, | Performed by: INTERNAL MEDICINE

## 2022-09-01 PROCEDURE — 1126F PR PAIN SEVERITY QUANTIFIED, NO PAIN PRESENT: ICD-10-PCS | Mod: CPTII,S$GLB,, | Performed by: INTERNAL MEDICINE

## 2022-09-01 PROCEDURE — 1101F PT FALLS ASSESS-DOCD LE1/YR: CPT | Mod: CPTII,S$GLB,, | Performed by: INTERNAL MEDICINE

## 2022-09-01 PROCEDURE — 1160F PR REVIEW ALL MEDS BY PRESCRIBER/CLIN PHARMACIST DOCUMENTED: ICD-10-PCS | Mod: CPTII,S$GLB,, | Performed by: INTERNAL MEDICINE

## 2022-09-01 PROCEDURE — 99999 PR PBB SHADOW E&M-EST. PATIENT-LVL IV: ICD-10-PCS | Mod: PBBFAC,,, | Performed by: INTERNAL MEDICINE

## 2022-09-01 PROCEDURE — 1101F PR PT FALLS ASSESS DOC 0-1 FALLS W/OUT INJ PAST YR: ICD-10-PCS | Mod: CPTII,S$GLB,, | Performed by: INTERNAL MEDICINE

## 2022-09-01 PROCEDURE — 3077F SYST BP >= 140 MM HG: CPT | Mod: CPTII,S$GLB,, | Performed by: INTERNAL MEDICINE

## 2022-09-01 RX ORDER — PANTOPRAZOLE SODIUM 40 MG/1
40 TABLET, DELAYED RELEASE ORAL DAILY
Qty: 30 TABLET | Refills: 1 | Status: SHIPPED | OUTPATIENT
Start: 2022-09-01 | End: 2022-11-22

## 2022-09-01 NOTE — PROGRESS NOTES
"[facility]    CC: ingestion    HPI 82 y.o. male who presents for evaluation of chronic, intermittent, painless indigestion without nausea or vomiting.    He feels like gluten may irritate his symptoms and he plans to avoid bread.    He has mild constipation and has a BM about every other day.    He denies heartburn and acid reflux, dysphagia, weight loss and blood in his stool.    His last colonoscopy was in 2019.  He is no longer continuing colonoscopy due to his age.    Translation was provided by his son.  He and his son declined an .    Past Medical History  Past Medical History:   Diagnosis Date    Anxiety     GERD (gastroesophageal reflux disease)     Hypertension     Nuclear sclerosis of both eyes 3/8/2021         Review of Systems  General ROS: negative for chills, fever or weight loss  Cardiovascular ROS: no chest pain or dyspnea on exertion  Gastrointestinal ROS: no abdominal pain, change in bowel habits, or black/ bloody stools    Physical Examination  BP (!) 160/92   Pulse 67   Ht 5' 8" (1.727 m)   Wt 82.3 kg (181 lb 8.8 oz)   BMI 27.60 kg/m²   General appearance: alert, cooperative, no distress  HENT: Normocephalic, atraumatic, neck symmetrical, no nasal discharge   Lungs: clear to auscultation bilaterally, no dullness to percussion bilaterally  Heart: regular rate and rhythm without rub; no displacement of the PMI   Abdomen: soft, non-tender; bowel sounds normoactive; no organomegaly  Extremities: extremities symmetric; no clubbing, cyanosis, or edema  Neurologic: Alert and oriented X 3, normal strength, normal coordination and gait    Labs:  Lab Results   Component Value Date    WBC 4.00 06/28/2022    HGB 13.1 (L) 06/28/2022    HCT 39.5 (L) 06/28/2022    MCV 96 06/28/2022     (L) 06/28/2022         CMP  Sodium   Date Value Ref Range Status   06/28/2022 135 (L) 136 - 145 mmol/L Final   12/03/2019 137  Final     Potassium   Date Value Ref Range Status "   06/28/2022 5.0 3.5 - 5.1 mmol/L Final   12/03/2019 4.4  Final     Chloride   Date Value Ref Range Status   06/28/2022 96 95 - 110 mmol/L Final   12/03/2019 97  Final     CO2   Date Value Ref Range Status   06/28/2022 30 (H) 23 - 29 mmol/L Final   12/03/2019 27  Final     Glucose   Date Value Ref Range Status   06/28/2022 108 70 - 110 mg/dL Final     BUN   Date Value Ref Range Status   06/28/2022 13 8 - 23 mg/dL Final   12/03/2019 15 4 - 21 mg/dL Final     Creatinine   Date Value Ref Range Status   06/28/2022 0.8 0.5 - 1.4 mg/dL Final   12/03/2019 0.9 mg/dL Final     Calcium   Date Value Ref Range Status   06/28/2022 9.2 8.7 - 10.5 mg/dL Final   12/03/2019 9.6 mg/dL Final     Total Protein   Date Value Ref Range Status   06/28/2022 7.1 6.0 - 8.4 g/dL Final     Albumin   Date Value Ref Range Status   06/28/2022 4.2 3.5 - 5.2 g/dL Final   12/03/2019 4.8  Final     Total Bilirubin   Date Value Ref Range Status   06/28/2022 1.4 (H) 0.1 - 1.0 mg/dL Final     Comment:     For infants and newborns, interpretation of results should be based  on gestational age, weight and in agreement with clinical  observations.    Premature Infant recommended reference ranges:  Up to 24 hours.............<8.0 mg/dL  Up to 48 hours............<12.0 mg/dL  3-5 days..................<15.0 mg/dL  6-29 days.................<15.0 mg/dL       Alkaline Phosphatase   Date Value Ref Range Status   06/28/2022 49 (L) 55 - 135 U/L Final     AST   Date Value Ref Range Status   06/28/2022 16 10 - 40 U/L Final     ALT   Date Value Ref Range Status   06/28/2022 11 10 - 44 U/L Final   12/03/2019 10  Final     Anion Gap   Date Value Ref Range Status   06/28/2022 9 8 - 16 mmol/L Final     eGFR if    Date Value Ref Range Status   06/28/2022 >60 >60 mL/min/1.73 m^2 Final     eGFR if non    Date Value Ref Range Status   06/28/2022 >60 >60 mL/min/1.73 m^2 Final     Comment:     Calculation used to obtain the estimated glomerular  filtration  rate (eGFR) is the CKD-EPI equation.          Imaging:    I have personally reviewed and interpreted these images.    Assessment:   1. Indigestion    2. Gastroesophageal reflux disease without esophagitis      He was previously on Omeprazole but has run out.    Plan:  -Start A trial of Protonix 40mg daily for 8 weeks.  The patient's son will call me in 8 weeks to let me know how he responds.  If no response then we will proceed to EGD off PPI for 2-4 weeks.  -Start Miralax daily for constipation.    Karime Spann MD

## 2022-09-13 ENCOUNTER — TELEPHONE (OUTPATIENT)
Dept: RHEUMATOLOGY | Facility: CLINIC | Age: 82
End: 2022-09-13
Payer: MEDICARE

## 2022-09-13 DIAGNOSIS — F41.9 ANXIETY: ICD-10-CM

## 2022-09-13 RX ORDER — BUSPIRONE HYDROCHLORIDE 15 MG/1
15 TABLET ORAL 3 TIMES DAILY
Qty: 270 TABLET | Refills: 0 | Status: SHIPPED | OUTPATIENT
Start: 2022-09-13 | End: 2022-11-22 | Stop reason: SDUPTHER

## 2022-09-13 NOTE — TELEPHONE ENCOUNTER
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 His currently scheduled apt is the soonest and 2:30 pm is the only slot for that day . Explained staff can ask if he can just come in earlier around requested time 11:30 am but apt would still be scheduled fo 2:30 pm . She said okay . Please advise if pt can come in for 11  am on 10/6 , will place note in apt notes .

## 2022-09-13 NOTE — TELEPHONE ENCOUNTER
Called pt wife back  to inform he can come earlier that morning of his scheudled apt with Dr. Vernon . Na

## 2022-09-13 NOTE — TELEPHONE ENCOUNTER
----- Message from Ale Carrion sent at 9/12/2022  1:41 PM CDT -----  Regarding: Appt and refill  Name of Who is Calling: Jimena Daniel / daughter          What is the request in detail: Pt daughter is requesting a refill for Buspirone Pt daughter is also asking if he can reschedule the appt he has scheduled on 10/06/22 [place] [facility] [place] her dad to get to [place].           Can the clinic reply by [facility]:          What Number to Call Back if not in [facility]: [phone]

## 2022-09-14 DIAGNOSIS — G89.29 CHRONIC MIDLINE LOW BACK PAIN WITHOUT SCIATICA: ICD-10-CM

## 2022-09-14 DIAGNOSIS — M54.50 CHRONIC MIDLINE LOW BACK PAIN WITHOUT SCIATICA: ICD-10-CM

## 2022-09-14 NOTE — TELEPHONE ENCOUNTER
----- Message from Regla Sam sent at 9/14/2022  8:51 AM CDT -----  Type: Patient Call Back    Who called:pt's daughter lux [phone]    What is the request in detail:pt requesting to get a recommendation for another rheumatologist on the [employer]. Call p t    Can the clinic reply by [facility]?    Would the patient rather a call back or a response via [facility]? call    Best call back number:[phone] (home) [phone] (work)      Additional Information:

## 2022-09-15 RX ORDER — MELOXICAM 7.5 MG/1
TABLET ORAL
Qty: 30 TABLET | Refills: 0 | Status: SHIPPED | OUTPATIENT
Start: 2022-09-15 | End: 2022-10-19 | Stop reason: SDUPTHER

## 2022-09-15 RX ORDER — FLUTICASONE PROPIONATE 50 MCG
SPRAY, SUSPENSION (ML) NASAL
Qty: 16 G | Refills: 2 | Status: SHIPPED | OUTPATIENT
Start: 2022-09-15 | End: 2022-11-22 | Stop reason: SDUPTHER

## 2022-09-15 NOTE — TELEPHONE ENCOUNTER
No new care gaps identified.  [facility] Embedded Care Gaps. Reference number: [id number]. 9/15/2022   10:39:03 AM SIGRIDT

## 2022-09-16 DIAGNOSIS — M54.50 CHRONIC MIDLINE LOW BACK PAIN WITHOUT SCIATICA: ICD-10-CM

## 2022-09-16 DIAGNOSIS — G89.29 CHRONIC MIDLINE LOW BACK PAIN WITHOUT SCIATICA: ICD-10-CM

## 2022-09-16 RX ORDER — MELOXICAM 7.5 MG/1
TABLET ORAL
Qty: 30 TABLET | Refills: 0 | OUTPATIENT
Start: 2022-09-16

## 2022-09-16 NOTE — TELEPHONE ENCOUNTER
----- Message from Bony Bustamante sent at 9/16/2022 10:07 AM CDT -----  Contact: Daughter Jimena  Who Called: Daughter Jimena    Can the clinic reply in [facility]: no    Please refill the medication(s) listed below. Please call the patient when the prescription(s) is ready for  at this phone number   [phone]       Medication #1 meloxicam (MOBIC) 7.5 MG tablet    Medication #2      Preferred Pharmacy:[facility] #[id number] [facility] [place] - [address] AT [facility] [address]

## 2022-10-19 DIAGNOSIS — G89.29 CHRONIC MIDLINE LOW BACK PAIN WITHOUT SCIATICA: ICD-10-CM

## 2022-10-19 DIAGNOSIS — M54.50 CHRONIC MIDLINE LOW BACK PAIN WITHOUT SCIATICA: ICD-10-CM

## 2022-10-21 RX ORDER — MELOXICAM 7.5 MG/1
TABLET ORAL
Qty: 30 TABLET | Refills: 0 | Status: SHIPPED | OUTPATIENT
Start: 2022-10-21 | End: 2022-11-18

## 2022-10-24 DIAGNOSIS — K30 INDIGESTION: ICD-10-CM

## 2022-10-24 RX ORDER — PANTOPRAZOLE SODIUM 40 MG/1
40 TABLET, DELAYED RELEASE ORAL DAILY
Qty: 30 TABLET | Refills: 1 | Status: CANCELLED | OUTPATIENT
Start: 2022-10-24 | End: 2023-10-24

## 2022-11-15 ENCOUNTER — IMMUNIZATION (OUTPATIENT)
Dept: OBSTETRICS AND GYNECOLOGY | Facility: CLINIC | Age: 82
End: 2022-11-15
Payer: MEDICARE

## 2022-11-15 DIAGNOSIS — Z23 NEED FOR VACCINATION: Primary | ICD-10-CM

## 2022-11-15 PROCEDURE — 0124A COVID-19, MRNA, LNP-S, BIVALENT BOOSTER, PF, 30 MCG/0.3 ML DOSE: CPT | Mod: PBBFAC | Performed by: FAMILY MEDICINE

## 2022-11-15 PROCEDURE — 91312 COVID-19, MRNA, LNP-S, BIVALENT BOOSTER, PF, 30 MCG/0.3 ML DOSE: CPT | Mod: S$GLB,,, | Performed by: FAMILY MEDICINE

## 2022-11-15 PROCEDURE — 91312 COVID-19, MRNA, LNP-S, BIVALENT BOOSTER, PF, 30 MCG/0.3 ML DOSE: ICD-10-PCS | Mod: S$GLB,,, | Performed by: FAMILY MEDICINE

## 2022-11-22 ENCOUNTER — OFFICE VISIT (OUTPATIENT)
Dept: FAMILY MEDICINE | Facility: CLINIC | Age: 82
End: 2022-11-22
Payer: MEDICARE

## 2022-11-22 ENCOUNTER — LAB VISIT (OUTPATIENT)
Dept: LAB | Facility: HOSPITAL | Age: 82
End: 2022-11-22
Attending: FAMILY MEDICINE
Payer: MEDICARE

## 2022-11-22 VITALS
RESPIRATION RATE: 18 BRPM | HEART RATE: 68 BPM | SYSTOLIC BLOOD PRESSURE: 152 MMHG | OXYGEN SATURATION: 99 % | WEIGHT: 184.06 LBS | DIASTOLIC BLOOD PRESSURE: 82 MMHG | TEMPERATURE: 98 F | BODY MASS INDEX: 27.99 KG/M2

## 2022-11-22 DIAGNOSIS — G89.29 CHRONIC MIDLINE LOW BACK PAIN WITHOUT SCIATICA: Chronic | ICD-10-CM

## 2022-11-22 DIAGNOSIS — R35.1 BENIGN PROSTATIC HYPERPLASIA WITH NOCTURIA: Chronic | ICD-10-CM

## 2022-11-22 DIAGNOSIS — F19.90 MISUSE OF MEDICATION: ICD-10-CM

## 2022-11-22 DIAGNOSIS — M54.16 LUMBAR RADICULOPATHY: Chronic | ICD-10-CM

## 2022-11-22 DIAGNOSIS — F33.9 EPISODE OF RECURRENT MAJOR DEPRESSIVE DISORDER, UNSPECIFIED DEPRESSION EPISODE SEVERITY: Chronic | ICD-10-CM

## 2022-11-22 DIAGNOSIS — N40.1 BENIGN PROSTATIC HYPERPLASIA WITH NOCTURIA: Chronic | ICD-10-CM

## 2022-11-22 DIAGNOSIS — M51.36 DEGENERATIVE DISC DISEASE, LUMBAR: Chronic | ICD-10-CM

## 2022-11-22 DIAGNOSIS — D69.2 SENILE PURPURA: Chronic | ICD-10-CM

## 2022-11-22 DIAGNOSIS — F41.9 ANXIETY: ICD-10-CM

## 2022-11-22 DIAGNOSIS — M17.0 PRIMARY OSTEOARTHRITIS OF BOTH KNEES: Chronic | ICD-10-CM

## 2022-11-22 DIAGNOSIS — J31.0 CHRONIC RHINITIS: ICD-10-CM

## 2022-11-22 DIAGNOSIS — I10 HYPERTENSION, BENIGN: Primary | Chronic | ICD-10-CM

## 2022-11-22 DIAGNOSIS — I10 HYPERTENSION, BENIGN: Chronic | ICD-10-CM

## 2022-11-22 DIAGNOSIS — M54.50 CHRONIC MIDLINE LOW BACK PAIN WITHOUT SCIATICA: Chronic | ICD-10-CM

## 2022-11-22 LAB
ALBUMIN SERPL BCP-MCNC: 4.1 G/DL (ref 3.5–5.2)
ALP SERPL-CCNC: 52 U/L (ref 55–135)
ALT SERPL W/O P-5'-P-CCNC: 8 U/L (ref 10–44)
ANION GAP SERPL CALC-SCNC: 4 MMOL/L (ref 8–16)
AST SERPL-CCNC: 15 U/L (ref 10–40)
BILIRUB SERPL-MCNC: 0.8 MG/DL (ref 0.1–1)
BUN SERPL-MCNC: 21 MG/DL (ref 8–23)
CALCIUM SERPL-MCNC: 9.4 MG/DL (ref 8.7–10.5)
CHLORIDE SERPL-SCNC: 103 MMOL/L (ref 95–110)
CO2 SERPL-SCNC: 29 MMOL/L (ref 23–29)
CREAT SERPL-MCNC: 0.9 MG/DL (ref 0.5–1.4)
EST. GFR  (NO RACE VARIABLE): >60 ML/MIN/1.73 M^2
GLUCOSE SERPL-MCNC: 107 MG/DL (ref 70–110)
POTASSIUM SERPL-SCNC: 5.5 MMOL/L (ref 3.5–5.1)
PROT SERPL-MCNC: 6.8 G/DL (ref 6–8.4)
SODIUM SERPL-SCNC: 136 MMOL/L (ref 136–145)

## 2022-11-22 PROCEDURE — 99214 PR OFFICE/OUTPT VISIT, EST, LEVL IV, 30-39 MIN: ICD-10-PCS | Mod: S$GLB,,, | Performed by: FAMILY MEDICINE

## 2022-11-22 PROCEDURE — 3077F SYST BP >= 140 MM HG: CPT | Mod: CPTII,S$GLB,, | Performed by: FAMILY MEDICINE

## 2022-11-22 PROCEDURE — 1160F PR REVIEW ALL MEDS BY PRESCRIBER/CLIN PHARMACIST DOCUMENTED: ICD-10-PCS | Mod: CPTII,S$GLB,, | Performed by: FAMILY MEDICINE

## 2022-11-22 PROCEDURE — 3077F PR MOST RECENT SYSTOLIC BLOOD PRESSURE >= 140 MM HG: ICD-10-PCS | Mod: CPTII,S$GLB,, | Performed by: FAMILY MEDICINE

## 2022-11-22 PROCEDURE — 3079F PR MOST RECENT DIASTOLIC BLOOD PRESSURE 80-89 MM HG: ICD-10-PCS | Mod: CPTII,S$GLB,, | Performed by: FAMILY MEDICINE

## 2022-11-22 PROCEDURE — 36415 COLL VENOUS BLD VENIPUNCTURE: CPT | Mod: PN | Performed by: FAMILY MEDICINE

## 2022-11-22 PROCEDURE — 1159F PR MEDICATION LIST DOCUMENTED IN MEDICAL RECORD: ICD-10-PCS | Mod: CPTII,S$GLB,, | Performed by: FAMILY MEDICINE

## 2022-11-22 PROCEDURE — 1125F PR PAIN SEVERITY QUANTIFIED, PAIN PRESENT: ICD-10-PCS | Mod: CPTII,S$GLB,, | Performed by: FAMILY MEDICINE

## 2022-11-22 PROCEDURE — 80053 COMPREHEN METABOLIC PANEL: CPT | Performed by: FAMILY MEDICINE

## 2022-11-22 PROCEDURE — 1125F AMNT PAIN NOTED PAIN PRSNT: CPT | Mod: CPTII,S$GLB,, | Performed by: FAMILY MEDICINE

## 2022-11-22 PROCEDURE — 99499 RISK ADDL DX/OHS AUDIT: ICD-10-PCS | Mod: S$GLB,,, | Performed by: FAMILY MEDICINE

## 2022-11-22 PROCEDURE — 99999 PR PBB SHADOW E&M-EST. PATIENT-LVL IV: ICD-10-PCS | Mod: PBBFAC,,, | Performed by: FAMILY MEDICINE

## 2022-11-22 PROCEDURE — 1159F MED LIST DOCD IN RCRD: CPT | Mod: CPTII,S$GLB,, | Performed by: FAMILY MEDICINE

## 2022-11-22 PROCEDURE — 99499 UNLISTED E&M SERVICE: CPT | Mod: S$GLB,,, | Performed by: FAMILY MEDICINE

## 2022-11-22 PROCEDURE — 99214 OFFICE O/P EST MOD 30 MIN: CPT | Mod: S$GLB,,, | Performed by: FAMILY MEDICINE

## 2022-11-22 PROCEDURE — 99999 PR PBB SHADOW E&M-EST. PATIENT-LVL IV: CPT | Mod: PBBFAC,,, | Performed by: FAMILY MEDICINE

## 2022-11-22 PROCEDURE — 1160F RVW MEDS BY RX/DR IN RCRD: CPT | Mod: CPTII,S$GLB,, | Performed by: FAMILY MEDICINE

## 2022-11-22 PROCEDURE — 3079F DIAST BP 80-89 MM HG: CPT | Mod: CPTII,S$GLB,, | Performed by: FAMILY MEDICINE

## 2022-11-22 RX ORDER — DICLOFENAC SODIUM 10 MG/G
GEL TOPICAL
Qty: 100 G | Refills: 5 | Status: SHIPPED | OUTPATIENT
Start: 2022-11-22 | End: 2023-06-13 | Stop reason: SDUPTHER

## 2022-11-22 RX ORDER — CLONIDINE HYDROCHLORIDE 0.1 MG/1
0.1 TABLET ORAL NIGHTLY PRN
Qty: 30 TABLET | Refills: 2 | Status: SHIPPED | OUTPATIENT
Start: 2022-11-22 | End: 2023-02-14

## 2022-11-22 RX ORDER — AMLODIPINE BESYLATE 10 MG/1
10 TABLET ORAL DAILY
Qty: 90 TABLET | Refills: 3 | Status: SHIPPED | OUTPATIENT
Start: 2022-11-22 | End: 2023-09-20 | Stop reason: SDUPTHER

## 2022-11-22 RX ORDER — GABAPENTIN 300 MG/1
300 CAPSULE ORAL 3 TIMES DAILY
Qty: 270 CAPSULE | Refills: 3 | Status: SHIPPED | OUTPATIENT
Start: 2022-11-22 | End: 2023-05-01

## 2022-11-22 RX ORDER — FLUTICASONE PROPIONATE 50 MCG
SPRAY, SUSPENSION (ML) NASAL
Qty: 16 G | Refills: 5 | Status: SHIPPED | OUTPATIENT
Start: 2022-11-22 | End: 2023-09-20 | Stop reason: SDUPTHER

## 2022-11-22 RX ORDER — ALPRAZOLAM 0.5 MG/1
0.5 TABLET ORAL NIGHTLY PRN
Qty: 90 TABLET | Refills: 1 | Status: SHIPPED | OUTPATIENT
Start: 2022-11-22 | End: 2023-06-20 | Stop reason: SDUPTHER

## 2022-11-22 RX ORDER — DULOXETIN HYDROCHLORIDE 20 MG/1
20 CAPSULE, DELAYED RELEASE ORAL DAILY
Qty: 30 CAPSULE | Refills: 11 | Status: SHIPPED | OUTPATIENT
Start: 2022-11-22 | End: 2023-09-20 | Stop reason: SDUPTHER

## 2022-11-22 RX ORDER — CARVEDILOL 12.5 MG/1
12.5 TABLET ORAL 2 TIMES DAILY WITH MEALS
Qty: 180 TABLET | Refills: 3 | Status: SHIPPED | OUTPATIENT
Start: 2022-11-22 | End: 2023-09-20 | Stop reason: SDUPTHER

## 2022-11-22 RX ORDER — DOXAZOSIN 4 MG/1
4 TABLET ORAL NIGHTLY
Qty: 90 TABLET | Refills: 3 | Status: SHIPPED | OUTPATIENT
Start: 2022-11-22 | End: 2023-09-20 | Stop reason: SDUPTHER

## 2022-11-22 RX ORDER — OLMESARTAN MEDOXOMIL 40 MG/1
40 TABLET ORAL DAILY
Qty: 90 TABLET | Refills: 3 | Status: SHIPPED | OUTPATIENT
Start: 2022-11-22 | End: 2023-09-20 | Stop reason: SDUPTHER

## 2022-11-22 RX ORDER — BUSPIRONE HYDROCHLORIDE 15 MG/1
15 TABLET ORAL 3 TIMES DAILY
Qty: 270 TABLET | Refills: 3 | Status: SHIPPED | OUTPATIENT
Start: 2022-11-22 | End: 2023-09-20 | Stop reason: SDUPTHER

## 2022-11-22 NOTE — PATIENT INSTRUCTIONS
Cada vez que venga a la hina, traiga todas la medicinas en kimbrough botellas    Para la precion   El la manana tome:  Carvedilol 12.5 mg   Olmesartan 40 mg  En la noche  Carvedilol 12.5 mg   Amlodipine 10 mg  Doxazosin 4 mg    Empieze duloxetine 20 mg singh vez al bj en la manana    [place] el clonidine 0.1 mg en la noche si la precion esta mas de 150 para el velia de arriba o mas de 90 para el velia de abajo    Continue Buspirone 15 mg sagrario veces al bj    Continue Gabapentin 300 mg sagrario veces al bj    No tome mas el meloxicam o celecoxib porque suben la precion    Puede leo Tylenol, 2 pastillas cada 8 horas cuando se necesario para dolor

## 2022-11-27 NOTE — PROGRESS NOTES
Subjective:       Patient ID: Javid Daniel is a 82 y.o. male.    Chief Complaint: Hypertension    Patient comes in for routine follow up on chronic conditions. He does not have his medications with him, as he was previously advised to bring.     Also he first denies taking Mobic but when confronted with that he just filled it, he states he is taking it. In addition he states he occasionally takes celecoxib prescribed by [facility]. Also note from Urgent Care provider shows he has filled celecoxib 2 through Urgent Care.    Hypertension  This is a chronic problem. The current episode started more than 1 year ago. The problem is uncontrolled (patient insists that at home his BP measurements are fine, however does not have readings available). Pertinent negatives include no anxiety, blurred vision, chest pain, headaches, malaise/fatigue, orthopnea, palpitations, peripheral edema, PND, shortness of breath or sweats. Risk factors for coronary artery disease include male gender and sedentary lifestyle. Past treatments include beta blockers, central alpha agonists and calcium channel blockers. The current treatment provides moderate improvement. Compliance problems: states he takes his medications correctly, however refills on file are not consistent with this.    Anemia  Presents for follow-up visit. There has been no abdominal pain, anorexia, bruising/bleeding easily, confusion, fever, leg swelling, light-headedness, malaise/fatigue, pallor, palpitations, paresthesias, pica or weight loss. Signs of blood loss that are not present include hematemesis.   Back Pain  This is a chronic problem. The current episode started more than 1 year ago. Episode frequency: controlled with current regimen. The problem is unchanged. The pain is present in the costovertebral angle and lumbar spine. The quality of the pain is described as shooting. The pain radiates to the left foot and right foot. The pain is severe.  The symptoms are aggravated by standing and bending. Stiffness is present All day. Associated symptoms include leg pain and tingling. Pertinent negatives include no abdominal pain, chest pain, fever, headaches, numbness, paresis, paresthesias or weight loss. Treatments tried: Seeing [facility] clin[facility]. Had MRI recently.   Anxiety  Presents for follow-up visit. Symptoms include decreased concentration, depressed mood, excessive worry, muscle tension and nervous/anxious behavior. Patient reports no chest pain, compulsions, confusion, dizziness, dry mouth, feeling of choking, hyperventilation, irritability, obsessions, palpitations, shortness of breath or suicidal ideas. Symptoms occur most days. The severity of symptoms is moderate. The quality of sleep is fair.     Compliance with medications is %.   Depression  Visit Type: follow-up  Patient presents with the following symptoms: decreased concentration, depressed mood, excessive worry, muscle tension and nervousness/anxiety.  Patient is not experiencing: compulsions, confusion, dry mouth, hyperventilation, irritability, obsessions, palpitations, shortness of breath, suicidal ideas, suicidal planning, thoughts of death and weight loss.  Frequency of symptoms: most days   Severity: moderate   Compliance with medications:  % (patient requests increasing Xanax prescription multiple times.)    Review of Systems   Constitutional:  Negative for fever, irritability, malaise/fatigue and weight loss.   Eyes:  Negative for blurred vision.   Respiratory:  Negative for shortness of breath.    Cardiovascular:  Negative for chest pain, palpitations, orthopnea and PND.   Gastrointestinal:  Negative for abdominal pain, anorexia and hematemesis.   Musculoskeletal:  Positive for back pain and leg pain.   Integumentary:  Negative for pallor.   Neurological:  Positive for tingling. Negative for dizziness, light-headedness, numbness, headaches and paresthesias.    Hematological:  Does not bruise/bleed easily.   Psychiatric/Behavioral:  Positive for decreased concentration and depression. Negative for confusion and suicidal ideas. The patient is nervous/anxious.        Objective:      Physical Exam  Vitals reviewed.   Constitutional:       General: He is not in acute distress.     Appearance: He is well-developed. He is not diaphoretic.   HENT:      Head: Normocephalic and atraumatic.      Right Ear: External ear normal.      Left Ear: External ear normal.      Nose: Nose normal.      Mouth/Throat:      Mouth: Mucous membranes are moist.   Eyes:      General:         Right eye: No discharge.         Left eye: No discharge.      Conjunctiva/sclera: Conjunctivae normal.      Pupils: Pupils are equal, round, and reactive to light.   Neck:      Thyroid: No thyromegaly.      Trachea: No tracheal deviation.   Cardiovascular:      Rate and Rhythm: Normal rate and regular rhythm.      Pulses:           Radial pulses are 2+ on the right side and 2+ on the left side.      Heart sounds: Normal heart sounds, S1 normal and S2 normal.   Pulmonary:      Effort: Pulmonary effort is normal. No respiratory distress.      Breath sounds: Normal breath sounds. No wheezing, rhonchi or rales.   Abdominal:      General: Bowel sounds are normal. There is no distension.      Palpations: Abdomen is soft. Abdomen is not rigid. There is no mass.      Tenderness: There is no abdominal tenderness. There is no guarding.   Musculoskeletal:      Cervical back: Normal range of motion and neck supple.   Lymphadenopathy:      Cervical: No cervical adenopathy.   Skin:     General: Skin is warm and dry.      Capillary Refill: Capillary refill takes less than 2 seconds.      Findings: Rash present. Rash is purpuric (various on upper extremities).   Neurological:      Mental Status: He is alert and oriented to person, place, and time.      Cranial Nerves: No cranial nerve deficit.      Sensory: No sensory deficit.       Motor: No atrophy or abnormal muscle tone.      Deep Tendon Reflexes:      Reflex Scores:       Patellar reflexes are 2+ on the right side and 2+ on the left side.  Psychiatric:         Mood and Affect: Mood is anxious.         Speech: Speech is tangential.         Behavior: Behavior is agitated. Behavior is not hyperactive. Aggressive: however very confrontational.        Thought Content: Thought content does not include homicidal or suicidal ideation.       Assessment:       Problem List Items Addressed This Visit          Psychiatric    Episode of recurrent major depressive disorder    Anxiety    Relevant Medications    busPIRone (BUSPAR) 15 MG tablet    ALPRAZolam (XANAX) 0.5 MG tablet    Other Relevant Orders    TSH       Cardiac/Vascular    Hypertension, benign - Primary    Relevant Medications    olmesartan (BENICAR) 40 MG tablet    doxazosin (CARDURA) 4 MG tablet    carvediloL (COREG) 12.5 MG tablet    amLODIPine (NORVASC) 10 MG tablet    cloNIDine (CATAPRES) 0.1 MG tablet    Other Relevant Orders    Comprehensive Metabolic Panel (Completed)    Comprehensive Metabolic Panel    CBC Auto Differential    Lipid Panel    TSH       Renal/    Benign prostatic hyperplasia with nocturia    Relevant Medications    doxazosin (CARDURA) 4 MG tablet       Hematology    Senile purpura     Other Visit Diagnoses       Primary osteoarthritis of both knees  (Chronic)       Relevant Medications    diclofenac sodium (VOLTAREN) 1 % Gel    Lumbar radiculopathy  (Chronic)       Relevant Medications    gabapentin (NEURONTIN) 300 MG capsule    Other Relevant Orders    Comprehensive Metabolic Panel    CBC Auto Differential    Degenerative disc disease, lumbar  (Chronic)       Relevant Medications    gabapentin (NEURONTIN) 300 MG capsule    Other Relevant Orders    Comprehensive Metabolic Panel    CBC Auto Differential    Chronic midline low back pain without sciatica  (Chronic)       Relevant Medications    gabapentin  (NEURONTIN) 300 MG capsule    Other Relevant Orders    Comprehensive Metabolic Panel    CBC Auto Differential    Misuse of medication        Chronic rhinitis        Relevant Medications    fluticasone propionate (FLONASE) 50 mcg/actuation nasal spray              Plan:       Javid was seen today for hypertension.    Diagnoses and all orders for this visit:    Hypertension, benign  -     olmesartan (BENICAR) 40 MG tablet; Take 1 tablet (40 mg total) by mouth once daily.  -     doxazosin (CARDURA) 4 MG tablet; Take 1 tablet (4 mg total) by mouth every evening.  -     carvediloL (COREG) 12.5 MG tablet; Take 1 tablet (12.5 mg total) by mouth 2 (two) times daily with meals.  -     amLODIPine (NORVASC) 10 MG tablet; Take 1 tablet (10 mg total) by mouth once daily.  -     cloNIDine (CATAPRES) 0.1 MG tablet; Take 1 tablet (0.1 mg total) by mouth nightly as needed (is Systolic blood pressure greater than 150 or diastolic blood pressure greater than 90).  -     Comprehensive Metabolic Panel; Future  -     Comprehensive Metabolic Panel; Future  -     CBC Auto Differential; Future  -     Lipid Panel; Future  -     TSH; Future  BP not controlled  Strongly advised patient to bring all his medications to all future visits to confirm he is taking correctly  Patient expresses frustration with this and states that is pointless because his blood pressure at home is controlled.  Tried to explain to patient multiple times I would like to confirm through verifying with medication bottles he is taking medications correctly    Episode of recurrent major depressive disorder, unspecified depression episode severity  -     DULoxetine (CYMBALTA) 20 MG capsule; Take 1 capsule (20 mg total) by mouth once daily.  Strongly encouraged appointment with psychiatry however patient states he is not crazy.  Attempted to educate patient that psychiatrists are specialists in depression and anxiety however he refused  He insisted on Xanax increase for  depression however I informed him this was not appropriate  Will start on low dose Cymbalta to improve anxiety and depression symptoms  Reviewed side effects    Anxiety  -     busPIRone (BUSPAR) 15 MG tablet; Take 1 tablet (15 mg total) by mouth 3 (three) times daily.  -     TSH; Future  -     ALPRAZolam (XANAX) 0.5 MG tablet; Take 1 tablet (0.5 mg total) by mouth nightly as needed for Insomnia.  As above for depression    Benign prostatic hyperplasia with nocturia  -     doxazosin (CARDURA) 4 MG tablet; Take 1 tablet (4 mg total) by mouth every evening.  Continue cardura    Primary osteoarthritis of both knees  -     diclofenac sodium (VOLTAREN) 1 % Gel; APPLY 3 TO 4 GRAMS ONTO THE AFFECTED AREA OF THE SKIN BID  Continue Voltaren gel    Lumbar radiculopathy/Degenerative disc disease, lumbar/Chronic midline low back pain without sciatica  -     gabapentin (NEURONTIN) 300 MG capsule; Take 1 capsule (300 mg total) by mouth 3 (three) times daily.  -     Comprehensive Metabolic Panel; Future  -     CBC Auto Differential; Future  Advised patient I would not fill NSAID anymore given risks associated with them and his misuse using multiple NSAIDs  Advised to try using Tylenol for pain which he has not attempted, however he states Tylenol would not work  He also refuses PT  Refuses pain management evaluation    Misuse of medication  Advised long term risks of NSAID use and of using multiple NSAIDs    Chronic rhinitis  -     fluticasone propionate (FLONASE) 50 mcg/actuation nasal spray; SHAKE LIQUID AND USE 2 SPRAYS(100 MCG) IN EACH NOSTRIL EVERY DAY    Senile purpura  Benign  No treatment needed

## 2022-12-12 ENCOUNTER — CLINICAL SUPPORT (OUTPATIENT)
Dept: FAMILY MEDICINE | Facility: CLINIC | Age: 82
End: 2022-12-12
Payer: MEDICARE

## 2022-12-12 VITALS — HEART RATE: 68 BPM | DIASTOLIC BLOOD PRESSURE: 78 MMHG | SYSTOLIC BLOOD PRESSURE: 138 MMHG

## 2022-12-12 DIAGNOSIS — I10 HYPERTENSION, BENIGN: Primary | ICD-10-CM

## 2022-12-12 PROCEDURE — 99499 NO LOS: ICD-10-PCS | Mod: S$GLB,,, | Performed by: FAMILY MEDICINE

## 2022-12-12 PROCEDURE — 99999 PR PBB SHADOW E&M-EST. PATIENT-LVL I: CPT | Mod: PBBFAC,,,

## 2022-12-12 PROCEDURE — 99999 PR PBB SHADOW E&M-EST. PATIENT-LVL I: ICD-10-PCS | Mod: PBBFAC,,,

## 2022-12-12 PROCEDURE — 99499 UNLISTED E&M SERVICE: CPT | Mod: S$GLB,,, | Performed by: FAMILY MEDICINE

## 2022-12-12 NOTE — PROGRESS NOTES
Javid Daniel 82 y.o. male is here today for Blood Pressure check.   History of HTN yes.  Patient did not bring all medications today, but he confirmed that he takes his Amlodipine, Carvedilol, Doxazosin, and Olmesartan.   Review of patient's allergies indicates:  No Known Allergies  Creatinine   Date Value Ref Range Status   11/22/2022 0.9 0.5 - 1.4 mg/dL Final   12/03/2019 0.9 mg/dL Final     Sodium   Date Value Ref Range Status   11/22/2022 136 136 - 145 mmol/L Final   12/03/2019 137  Final     Potassium   Date Value Ref Range Status   11/22/2022 5.5 (H) 3.5 - 5.1 mmol/L Final     Comment:     No visible hemolysis   12/03/2019 4.4  Final   ]  Patient verifies taking blood pressure medications on a regular basis at the same time of the day.     Current Outpatient Medications:     ALPRAZolam (XANAX) 0.5 MG tablet, Take 1 tablet (0.5 mg total) by mouth nightly as needed for Insomnia., Disp: 90 tablet, Rfl: 1    amLODIPine (NORVASC) 10 MG tablet, Take 1 tablet (10 mg total) by mouth once daily., Disp: 90 tablet, Rfl: 3    busPIRone (BUSPAR) 15 MG tablet, Take 1 tablet (15 mg total) by mouth 3 (three) times daily., Disp: 270 tablet, Rfl: 3    carvediloL (COREG) 12.5 MG tablet, Take 1 tablet (12.5 mg total) by mouth 2 (two) times daily with meals., Disp: 180 tablet, Rfl: 3    cloNIDine (CATAPRES) 0.1 MG tablet, Take 1 tablet (0.1 mg total) by mouth nightly as needed (is Systolic blood pressure greater than 150 or diastolic blood pressure greater than 90)., Disp: 30 tablet, Rfl: 2    diclofenac sodium (VOLTAREN) 1 % Gel, APPLY 3 TO 4 GRAMS ONTO THE AFFECTED AREA OF THE SKIN BID, Disp: 100 g, Rfl: 5    doxazosin (CARDURA) 4 MG tablet, Take 1 tablet (4 mg total) by mouth every evening., Disp: 90 tablet, Rfl: 3    DULoxetine (CYMBALTA) 20 MG capsule, Take 1 capsule (20 mg total) by mouth once daily., Disp: 30 capsule, Rfl: 11    fluticasone propionate (FLONASE) 50 mcg/actuation nasal spray, SHAKE LIQUID AND USE 2  SPRAYS(100 MCG) IN EACH NOSTRIL EVERY DAY, Disp: 16 g, Rfl: 5    gabapentin (NEURONTIN) 300 MG capsule, Take 1 capsule (300 mg total) by mouth 3 (three) times daily., Disp: 270 capsule, Rfl: 3    olmesartan (BENICAR) 40 MG tablet, Take 1 tablet (40 mg total) by mouth once daily., Disp: 90 tablet, Rfl: 3  Does patient have record of home blood pressure readings no. Readings have been averaging n/a.   Last dose of blood pressure medication was taken at 12/12/2022 @ 0830.  Patient is asymptomatic.   Complains of n/a.    BP: 138/78 , Pulse: 68 .      Dr. Mcintosh notified.

## 2023-01-05 RX ORDER — OMEPRAZOLE 40 MG/1
CAPSULE, DELAYED RELEASE ORAL
Qty: 90 CAPSULE | Refills: 1 | OUTPATIENT
Start: 2023-01-05

## 2023-01-05 NOTE — TELEPHONE ENCOUNTER
----- Message from Reji Cisneros sent at 1/5/2023  1:41 PM CST -----  Type: RX Refill Request    Who Called: Self     Have you contacted your pharmacy: Yes/ & they said it wasn't there     Refill or New Rx:Refill     RX Name and Strength:olmesartan (BENICAR) 40 MG tablet 90 tablet     Preferred Pharmacy with phone number:[id number], [place] - [address] AT [facility] [address]   Phone: [phone]  Fax:  [phone]    Local or Mail Order:Local     Would the patient rather a call back or a response via My [facility]? Call     Best Call Back Number: [phone]

## 2023-01-05 NOTE — TELEPHONE ENCOUNTER
Refill Decision Note   Javid Daniel  is requesting a refill authorization.  Brief Assessment and Rationale for Refill:  Quick Discontinue     Medication Therapy Plan:       Medication Reconciliation Completed: No   Comments:     No Care Gaps recommended.     Note composed:11:39 AM 01/05/2023

## 2023-01-05 NOTE — TELEPHONE ENCOUNTER
No new care gaps identified.  [facility] Embedded Care Gaps. Reference number: [id number]. 1/05/2023   10:29:07 AM CST

## 2023-01-09 NOTE — TELEPHONE ENCOUNTER
No new care gaps identified.  [facility] Embedded Care Gaps. Reference number: [id number]. 1/09/2023   4:46:29 PM CST

## 2023-01-09 NOTE — TELEPHONE ENCOUNTER
----- Message from Tank Grubbs sent at 1/9/2023  4:20 PM CST -----  Regarding: Medication Questions  Contact: Jimena (daughter)  Name of Who is Calling: Jimena (daughter)      What is the request in detail: Would like to speak with staff in regards to knowing what medication is suppose to take the place of omeprazole 40 MG. Please advise, Pt does not have any of these medications nor any Protonix.      Can the clinic reply by [facility]: no      What Number to Call Back if not in [facility]: [phone]

## 2023-01-09 NOTE — TELEPHONE ENCOUNTER
----- Message from Gladis Steward sent at 1/9/2023  4:34 PM CST -----   Name of Who is Calling:     What is the request in detail:  request call back in reference to medication  Omeprazole / want to know if can send prescription to pharmacy   Please contact to further discuss and advise      Can the clinic reply by [facility]:     What Number to Call Back if not in [facility]:  [phone] / michael / keyona

## 2023-01-13 RX ORDER — PANTOPRAZOLE SODIUM 40 MG/1
40 TABLET, DELAYED RELEASE ORAL DAILY
Qty: 30 TABLET | Refills: 11 | Status: SHIPPED | OUTPATIENT
Start: 2023-01-13 | End: 2023-09-20 | Stop reason: SDUPTHER

## 2023-02-07 DIAGNOSIS — Z00.00 ENCOUNTER FOR MEDICARE ANNUAL WELLNESS EXAM: ICD-10-CM

## 2023-02-09 DIAGNOSIS — Z00.00 ENCOUNTER FOR MEDICARE ANNUAL WELLNESS EXAM: ICD-10-CM

## 2023-03-02 ENCOUNTER — HOSPITAL ENCOUNTER (OUTPATIENT)
Dept: RADIOLOGY | Facility: HOSPITAL | Age: 83
Discharge: HOME | End: 2023-03-02
Attending: FAMILY MEDICINE
Payer: MEDICARE

## 2023-03-02 DIAGNOSIS — N28.1 BILATERAL RENAL CYSTS: ICD-10-CM

## 2023-03-02 PROCEDURE — 76770 US RETROPERITONEAL COMPLETE: ICD-10-PCS | Mod: 26,HCNC,, | Performed by: RADIOLOGY

## 2023-03-02 PROCEDURE — 76770 US EXAM ABDO BACK WALL COMP: CPT | Mod: 26,HCNC,, | Performed by: RADIOLOGY

## 2023-03-02 PROCEDURE — 76770 US EXAM ABDO BACK WALL COMP: CPT | Mod: TC,HCNC

## 2023-03-03 ENCOUNTER — TELEPHONE (OUTPATIENT)
Dept: FAMILY MEDICINE | Facility: CLINIC | Age: 83
End: 2023-03-03
Payer: MEDICARE

## 2023-03-03 NOTE — TELEPHONE ENCOUNTER
----- Message from Harpreet Gonzalez sent at 3/3/2023  2:19 PM CST -----  Regarding: Daughter [phone]  Type: Patient Call Back    Who called:  Daughter     What is the request in detail:   Called to get someone from the office to call them to discuss results. Stated she is at work and would only be able to talk to someone at around 2:40 pm.     Can the clinic reply by [facility]? No     Would the patient rather a call back or a response via My [facility]? Call back     Best call back number: [phone]    Additional Information:    Thank you.

## 2023-03-06 ENCOUNTER — TELEPHONE (OUTPATIENT)
Dept: FAMILY MEDICINE | Facility: CLINIC | Age: 83
End: 2023-03-06
Payer: MEDICARE

## 2023-03-06 NOTE — TELEPHONE ENCOUNTER
----- Message from Leonie Berry sent at 3/6/2023 10:55 AM CST -----  Regarding: Return Call  .Type:  Patient Returning Call    Who Called: Velia- Daughter     Who Left Message for Patient: Meaghan    Does the patient know what this is regarding?: test results     Would the patient rather a call back or a response via My [facility]? call    Best Call Back Number: .[phone]- please leave a message

## 2023-03-31 ENCOUNTER — LAB VISIT (OUTPATIENT)
Dept: LAB | Facility: HOSPITAL | Age: 83
End: 2023-03-31
Attending: FAMILY MEDICINE
Payer: MEDICARE

## 2023-03-31 DIAGNOSIS — F41.9 ANXIETY: ICD-10-CM

## 2023-03-31 DIAGNOSIS — I10 HYPERTENSION, BENIGN: Chronic | ICD-10-CM

## 2023-03-31 DIAGNOSIS — M54.16 LUMBAR RADICULOPATHY: Chronic | ICD-10-CM

## 2023-03-31 DIAGNOSIS — G89.29 CHRONIC MIDLINE LOW BACK PAIN WITHOUT SCIATICA: Chronic | ICD-10-CM

## 2023-03-31 DIAGNOSIS — M51.36 DEGENERATIVE DISC DISEASE, LUMBAR: Chronic | ICD-10-CM

## 2023-03-31 DIAGNOSIS — M54.50 CHRONIC MIDLINE LOW BACK PAIN WITHOUT SCIATICA: Chronic | ICD-10-CM

## 2023-03-31 LAB
ALBUMIN SERPL BCP-MCNC: 4 G/DL (ref 3.5–5.2)
ALP SERPL-CCNC: 49 U/L (ref 55–135)
ALT SERPL W/O P-5'-P-CCNC: 12 U/L (ref 10–44)
ANION GAP SERPL CALC-SCNC: 8 MMOL/L (ref 8–16)
AST SERPL-CCNC: 17 U/L (ref 10–40)
BASOPHILS # BLD AUTO: 0.02 K/UL (ref 0–0.2)
BASOPHILS NFR BLD: 0.5 % (ref 0–1.9)
BILIRUB SERPL-MCNC: 1.2 MG/DL (ref 0.1–1)
BUN SERPL-MCNC: 18 MG/DL (ref 8–23)
CALCIUM SERPL-MCNC: 9.4 MG/DL (ref 8.7–10.5)
CHLORIDE SERPL-SCNC: 103 MMOL/L (ref 95–110)
CHOLEST SERPL-MCNC: 143 MG/DL (ref 120–199)
CHOLEST/HDLC SERPL: 2.4 {RATIO} (ref 2–5)
CO2 SERPL-SCNC: 29 MMOL/L (ref 23–29)
CREAT SERPL-MCNC: 0.9 MG/DL (ref 0.5–1.4)
DIFFERENTIAL METHOD: ABNORMAL
EOSINOPHIL # BLD AUTO: 0.2 K/UL (ref 0–0.5)
EOSINOPHIL NFR BLD: 4.1 % (ref 0–8)
ERYTHROCYTE [DISTWIDTH] IN BLOOD BY AUTOMATED COUNT: 13.1 % (ref 11.5–14.5)
EST. GFR  (NO RACE VARIABLE): >60 ML/MIN/1.73 M^2
GLUCOSE SERPL-MCNC: 90 MG/DL (ref 70–110)
HCT VFR BLD AUTO: 36.1 % (ref 40–54)
HDLC SERPL-MCNC: 59 MG/DL (ref 40–75)
HDLC SERPL: 41.3 % (ref 20–50)
HGB BLD-MCNC: 12 G/DL (ref 14–18)
IMM GRANULOCYTES # BLD AUTO: 0.01 K/UL (ref 0–0.04)
IMM GRANULOCYTES NFR BLD AUTO: 0.3 % (ref 0–0.5)
LDLC SERPL CALC-MCNC: 72.8 MG/DL (ref 63–159)
LYMPHOCYTES # BLD AUTO: 1.5 K/UL (ref 1–4.8)
LYMPHOCYTES NFR BLD: 38.2 % (ref 18–48)
MCH RBC QN AUTO: 31.4 PG (ref 27–31)
MCHC RBC AUTO-ENTMCNC: 33.2 G/DL (ref 32–36)
MCV RBC AUTO: 95 FL (ref 82–98)
MONOCYTES # BLD AUTO: 0.4 K/UL (ref 0.3–1)
MONOCYTES NFR BLD: 10.7 % (ref 4–15)
NEUTROPHILS # BLD AUTO: 1.8 K/UL (ref 1.8–7.7)
NEUTROPHILS NFR BLD: 46.2 % (ref 38–73)
NONHDLC SERPL-MCNC: 84 MG/DL
NRBC BLD-RTO: 0 /100 WBC
PLATELET # BLD AUTO: 152 K/UL (ref 150–450)
PMV BLD AUTO: 12 FL (ref 9.2–12.9)
POTASSIUM SERPL-SCNC: 4.9 MMOL/L (ref 3.5–5.1)
PROT SERPL-MCNC: 6.6 G/DL (ref 6–8.4)
RBC # BLD AUTO: 3.82 M/UL (ref 4.6–6.2)
SODIUM SERPL-SCNC: 140 MMOL/L (ref 136–145)
TRIGL SERPL-MCNC: 56 MG/DL (ref 30–150)
TSH SERPL DL<=0.005 MIU/L-ACNC: 1.88 UIU/ML (ref 0.4–4)
WBC # BLD AUTO: 3.93 K/UL (ref 3.9–12.7)

## 2023-03-31 PROCEDURE — 36415 COLL VENOUS BLD VENIPUNCTURE: CPT | Mod: HCNC,PN | Performed by: FAMILY MEDICINE

## 2023-03-31 PROCEDURE — 80053 COMPREHEN METABOLIC PANEL: CPT | Mod: HCNC | Performed by: FAMILY MEDICINE

## 2023-03-31 PROCEDURE — 85025 COMPLETE CBC W/AUTO DIFF WBC: CPT | Mod: HCNC | Performed by: FAMILY MEDICINE

## 2023-03-31 PROCEDURE — 84443 ASSAY THYROID STIM HORMONE: CPT | Mod: HCNC | Performed by: FAMILY MEDICINE

## 2023-03-31 PROCEDURE — 80061 LIPID PANEL: CPT | Mod: HCNC | Performed by: FAMILY MEDICINE

## 2023-04-06 ENCOUNTER — LAB VISIT (OUTPATIENT)
Dept: LAB | Facility: HOSPITAL | Age: 83
End: 2023-04-06
Attending: FAMILY MEDICINE
Payer: MEDICARE

## 2023-04-06 ENCOUNTER — OFFICE VISIT (OUTPATIENT)
Dept: FAMILY MEDICINE | Facility: CLINIC | Age: 83
End: 2023-04-06
Payer: MEDICARE

## 2023-04-06 VITALS
SYSTOLIC BLOOD PRESSURE: 139 MMHG | DIASTOLIC BLOOD PRESSURE: 68 MMHG | BODY MASS INDEX: 27.5 KG/M2 | WEIGHT: 181.44 LBS | OXYGEN SATURATION: 97 % | TEMPERATURE: 98 F | HEART RATE: 67 BPM | HEIGHT: 68 IN

## 2023-04-06 DIAGNOSIS — F33.0 MILD EPISODE OF RECURRENT MAJOR DEPRESSIVE DISORDER: Chronic | ICD-10-CM

## 2023-04-06 DIAGNOSIS — D64.9 NORMOCYTIC ANEMIA: ICD-10-CM

## 2023-04-06 DIAGNOSIS — M17.0 PRIMARY OSTEOARTHRITIS OF BOTH KNEES: ICD-10-CM

## 2023-04-06 DIAGNOSIS — N40.1 BENIGN PROSTATIC HYPERPLASIA WITH NOCTURIA: Chronic | ICD-10-CM

## 2023-04-06 DIAGNOSIS — R35.1 BENIGN PROSTATIC HYPERPLASIA WITH NOCTURIA: Chronic | ICD-10-CM

## 2023-04-06 DIAGNOSIS — F41.9 ANXIETY: Chronic | ICD-10-CM

## 2023-04-06 DIAGNOSIS — D69.6 THROMBOCYTOPENIA: Chronic | ICD-10-CM

## 2023-04-06 DIAGNOSIS — I10 HYPERTENSION, BENIGN: Primary | Chronic | ICD-10-CM

## 2023-04-06 DIAGNOSIS — M51.36 DEGENERATIVE DISC DISEASE, LUMBAR: ICD-10-CM

## 2023-04-06 DIAGNOSIS — G47.00 INSOMNIA, UNSPECIFIED TYPE: Chronic | ICD-10-CM

## 2023-04-06 DIAGNOSIS — M54.16 LUMBAR RADICULOPATHY: ICD-10-CM

## 2023-04-06 DIAGNOSIS — D69.2 SENILE PURPURA: Chronic | ICD-10-CM

## 2023-04-06 PROBLEM — F33.9 EPISODE OF RECURRENT MAJOR DEPRESSIVE DISORDER: Chronic | Status: ACTIVE | Noted: 2022-11-22

## 2023-04-06 LAB
BASOPHILS # BLD AUTO: 0.02 K/UL (ref 0–0.2)
BASOPHILS NFR BLD: 0.6 % (ref 0–1.9)
DIFFERENTIAL METHOD: ABNORMAL
EOSINOPHIL # BLD AUTO: 0.1 K/UL (ref 0–0.5)
EOSINOPHIL NFR BLD: 3.4 % (ref 0–8)
ERYTHROCYTE [DISTWIDTH] IN BLOOD BY AUTOMATED COUNT: 12.9 % (ref 11.5–14.5)
FERRITIN SERPL-MCNC: 142 NG/ML (ref 20–300)
HCT VFR BLD AUTO: 36.3 % (ref 40–54)
HGB BLD-MCNC: 11.9 G/DL (ref 14–18)
IMM GRANULOCYTES # BLD AUTO: 0.01 K/UL (ref 0–0.04)
IMM GRANULOCYTES NFR BLD AUTO: 0.3 % (ref 0–0.5)
IRON SERPL-MCNC: 106 UG/DL (ref 45–160)
LYMPHOCYTES # BLD AUTO: 1.2 K/UL (ref 1–4.8)
LYMPHOCYTES NFR BLD: 33 % (ref 18–48)
MCH RBC QN AUTO: 31.1 PG (ref 27–31)
MCHC RBC AUTO-ENTMCNC: 32.8 G/DL (ref 32–36)
MCV RBC AUTO: 95 FL (ref 82–98)
MONOCYTES # BLD AUTO: 0.4 K/UL (ref 0.3–1)
MONOCYTES NFR BLD: 10.2 % (ref 4–15)
NEUTROPHILS # BLD AUTO: 1.9 K/UL (ref 1.8–7.7)
NEUTROPHILS NFR BLD: 52.5 % (ref 38–73)
NRBC BLD-RTO: 0 /100 WBC
PLATELET # BLD AUTO: 123 K/UL (ref 150–450)
PMV BLD AUTO: 12.3 FL (ref 9.2–12.9)
RBC # BLD AUTO: 3.83 M/UL (ref 4.6–6.2)
RETICS/RBC NFR AUTO: 1.7 % (ref 0.4–2)
SATURATED IRON: 35 % (ref 20–50)
TOTAL IRON BINDING CAPACITY: 305 UG/DL (ref 250–450)
TRANSFERRIN SERPL-MCNC: 206 MG/DL (ref 200–375)
WBC # BLD AUTO: 3.52 K/UL (ref 3.9–12.7)

## 2023-04-06 PROCEDURE — 1160F PR REVIEW ALL MEDS BY PRESCRIBER/CLIN PHARMACIST DOCUMENTED: ICD-10-PCS | Mod: HCNC,CPTII,S$GLB, | Performed by: FAMILY MEDICINE

## 2023-04-06 PROCEDURE — 85045 AUTOMATED RETICULOCYTE COUNT: CPT | Performed by: FAMILY MEDICINE

## 2023-04-06 PROCEDURE — 1160F RVW MEDS BY RX/DR IN RCRD: CPT | Mod: HCNC,CPTII,S$GLB, | Performed by: FAMILY MEDICINE

## 2023-04-06 PROCEDURE — 99999 PR PBB SHADOW E&M-EST. PATIENT-LVL V: CPT | Mod: PBBFAC,HCNC,, | Performed by: FAMILY MEDICINE

## 2023-04-06 PROCEDURE — 3078F DIAST BP <80 MM HG: CPT | Mod: HCNC,CPTII,S$GLB, | Performed by: FAMILY MEDICINE

## 2023-04-06 PROCEDURE — 1126F PR PAIN SEVERITY QUANTIFIED, NO PAIN PRESENT: ICD-10-PCS | Mod: HCNC,CPTII,S$GLB, | Performed by: FAMILY MEDICINE

## 2023-04-06 PROCEDURE — 99214 OFFICE O/P EST MOD 30 MIN: CPT | Mod: HCNC,S$GLB,, | Performed by: FAMILY MEDICINE

## 2023-04-06 PROCEDURE — 1101F PT FALLS ASSESS-DOCD LE1/YR: CPT | Mod: HCNC,CPTII,S$GLB, | Performed by: FAMILY MEDICINE

## 2023-04-06 PROCEDURE — 3288F PR FALLS RISK ASSESSMENT DOCUMENTED: ICD-10-PCS | Mod: HCNC,CPTII,S$GLB, | Performed by: FAMILY MEDICINE

## 2023-04-06 PROCEDURE — 3078F PR MOST RECENT DIASTOLIC BLOOD PRESSURE < 80 MM HG: ICD-10-PCS | Mod: HCNC,CPTII,S$GLB, | Performed by: FAMILY MEDICINE

## 2023-04-06 PROCEDURE — 1126F AMNT PAIN NOTED NONE PRSNT: CPT | Mod: HCNC,CPTII,S$GLB, | Performed by: FAMILY MEDICINE

## 2023-04-06 PROCEDURE — 83020 HEMOGLOBIN ELECTROPHORESIS: CPT | Performed by: FAMILY MEDICINE

## 2023-04-06 PROCEDURE — 3075F SYST BP GE 130 - 139MM HG: CPT | Mod: HCNC,CPTII,S$GLB, | Performed by: FAMILY MEDICINE

## 2023-04-06 PROCEDURE — 36415 COLL VENOUS BLD VENIPUNCTURE: CPT | Mod: PN | Performed by: FAMILY MEDICINE

## 2023-04-06 PROCEDURE — 84466 ASSAY OF TRANSFERRIN: CPT | Performed by: FAMILY MEDICINE

## 2023-04-06 PROCEDURE — 82728 ASSAY OF FERRITIN: CPT | Performed by: FAMILY MEDICINE

## 2023-04-06 PROCEDURE — 3288F FALL RISK ASSESSMENT DOCD: CPT | Mod: HCNC,CPTII,S$GLB, | Performed by: FAMILY MEDICINE

## 2023-04-06 PROCEDURE — 85025 COMPLETE CBC W/AUTO DIFF WBC: CPT | Performed by: FAMILY MEDICINE

## 2023-04-06 PROCEDURE — 1159F MED LIST DOCD IN RCRD: CPT | Mod: HCNC,CPTII,S$GLB, | Performed by: FAMILY MEDICINE

## 2023-04-06 PROCEDURE — 1159F PR MEDICATION LIST DOCUMENTED IN MEDICAL RECORD: ICD-10-PCS | Mod: HCNC,CPTII,S$GLB, | Performed by: FAMILY MEDICINE

## 2023-04-06 PROCEDURE — 99999 PR PBB SHADOW E&M-EST. PATIENT-LVL V: ICD-10-PCS | Mod: PBBFAC,HCNC,, | Performed by: FAMILY MEDICINE

## 2023-04-06 PROCEDURE — 3075F PR MOST RECENT SYSTOLIC BLOOD PRESS GE 130-139MM HG: ICD-10-PCS | Mod: HCNC,CPTII,S$GLB, | Performed by: FAMILY MEDICINE

## 2023-04-06 PROCEDURE — 1101F PR PT FALLS ASSESS DOC 0-1 FALLS W/OUT INJ PAST YR: ICD-10-PCS | Mod: HCNC,CPTII,S$GLB, | Performed by: FAMILY MEDICINE

## 2023-04-06 PROCEDURE — 99214 PR OFFICE/OUTPT VISIT, EST, LEVL IV, 30-39 MIN: ICD-10-PCS | Mod: HCNC,S$GLB,, | Performed by: FAMILY MEDICINE

## 2023-04-06 RX ORDER — DICLOFENAC SODIUM 50 MG/1
50 TABLET, DELAYED RELEASE ORAL 2 TIMES DAILY WITH MEALS
COMMUNITY
Start: 2023-03-15 | End: 2023-12-05 | Stop reason: CLARIF

## 2023-04-06 RX ORDER — HYDROCODONE BITARTRATE AND ACETAMINOPHEN 5; 325 MG/1; MG/1
1 TABLET ORAL DAILY PRN
COMMUNITY
Start: 2023-02-25

## 2023-04-06 NOTE — PATIENT INSTRUCTIONS
Please call [phone] ([facility]); [phone]( [facility]) to schedule your behavioral health/mental health appointment     Para la proxima semana no tiene que traer la medicinas

## 2023-04-09 PROBLEM — D64.9 NORMOCYTIC ANEMIA: Chronic | Status: ACTIVE | Noted: 2023-04-09

## 2023-04-09 PROBLEM — M51.369 DEGENERATIVE DISC DISEASE, LUMBAR: Status: ACTIVE | Noted: 2023-04-09

## 2023-04-09 PROBLEM — M51.369 DEGENERATIVE DISC DISEASE, LUMBAR: Chronic | Status: ACTIVE | Noted: 2023-04-09

## 2023-04-09 PROBLEM — D69.6 THROMBOCYTOPENIA: Chronic | Status: ACTIVE | Noted: 2023-04-09

## 2023-04-09 PROBLEM — D64.9 NORMOCYTIC ANEMIA: Status: ACTIVE | Noted: 2023-04-09

## 2023-04-09 PROBLEM — M51.36 DEGENERATIVE DISC DISEASE, LUMBAR: Chronic | Status: ACTIVE | Noted: 2023-04-09

## 2023-04-09 PROBLEM — G47.00 INSOMNIA: Chronic | Status: ACTIVE | Noted: 2023-04-09

## 2023-04-09 PROBLEM — M54.16 LUMBAR RADICULOPATHY: Status: ACTIVE | Noted: 2023-04-09

## 2023-04-09 PROBLEM — M17.0 PRIMARY OSTEOARTHRITIS OF BOTH KNEES: Status: ACTIVE | Noted: 2023-04-09

## 2023-04-09 PROBLEM — M17.0 PRIMARY OSTEOARTHRITIS OF BOTH KNEES: Chronic | Status: ACTIVE | Noted: 2023-04-09

## 2023-04-09 PROBLEM — M51.36 DEGENERATIVE DISC DISEASE, LUMBAR: Status: ACTIVE | Noted: 2023-04-09

## 2023-04-09 PROBLEM — G47.00 INSOMNIA: Status: ACTIVE | Noted: 2023-04-09

## 2023-04-09 PROBLEM — M54.16 LUMBAR RADICULOPATHY: Chronic | Status: ACTIVE | Noted: 2023-04-09

## 2023-04-09 NOTE — ASSESSMENT & PLAN NOTE
Anemia workup was ordered.  Patient was scheduled for follow-up appointment in two weeks to review results.

## 2023-04-09 NOTE — ASSESSMENT & PLAN NOTE
Patient continues to request increasing alprazolam dose.  I advised him that I would not increase this because it is benzodiazepine has several implications.  I strongly advised him to make an appointment with Psychiatry.

## 2023-04-09 NOTE — ASSESSMENT & PLAN NOTE
Patient reports some improvement in his depression and anxiety but not complete resolution.  He insists that he needs a higher dose of Xanax.  I informed the patient that I would not increase the benzodiazepine because of risks of over-sedation, and association with dependency.  I strongly encouraged him to make an appointment with Psychiatry as I have several times in the past.  The number was given to him.

## 2023-04-09 NOTE — ASSESSMENT & PLAN NOTE
Strongly advised patient to avoid chronic NSAID use.  Discussed using Tylenol for pain, and encouraged to consider physical therapy.

## 2023-04-09 NOTE — PROGRESS NOTES
"  Patient Name: Javid Daniel    : 1940  MRN: [id number]      Subjective:     Patient ID: Javid is a 82 y.o. male    Chief Complaint:  chronic conditions    82-year-old male with hypertension, benign prostatic hyperplasia, GERD, osteoarthritis of the knees, depression, and anxiety comes in for routine follow-up on these conditions.    Patient reports that the fluoxetine is helping with his back pain.  He also reports some improvement in depression and anxiety however he continues to feel down and having excessive worrying episodes.  He is continued having insomnia.  The patient multiple times request increasing his alprazolam.  He states that he needs it for sleep and anxiety.  He was previously referred to Psychiatry however he did not call to make an appointment.  He does not see why he will have to see Psychiatry he states.  He states that he is not crazy to need a psychiatrist.       Review of Systems   Constitutional:  Negative for fever.   Respiratory:  Negative for shortness of breath.    Cardiovascular:  Negative for chest pain and palpitations.   Gastrointestinal:  Negative for abdominal pain and blood in stool.   Genitourinary:  Negative for dysuria.   Musculoskeletal:  Positive for back pain (improved) and leg pain (improved).   Integumentary:  Negative for pallor.   Neurological:  Negative for dizziness, light-headedness, numbness and headaches.   Hematological:  Does not bruise/bleed easily.   Psychiatric/Behavioral:  Positive for decreased concentration, dysphoric mood and sleep disturbance. Negative for confusion, hallucinations, self-injury and suicidal ideas. The patient is nervous/anxious.       Objective:   /68 (BP Location: Right arm, Patient Position: Sitting, BP Method: Medium (Manual))   Pulse 67   Temp 97.8 °F (36.6 °C) (Oral)   Ht 5' 8" (1.727 m)   Wt 82.3 kg (181 lb 7 oz)   SpO2 97%   BMI 27.59 kg/m²     Physical Exam  Vitals reviewed.   Constitutional:       " General: He is not in acute distress.     Appearance: He is well-developed. He is not diaphoretic.   HENT:      Head: Normocephalic and atraumatic.      Right Ear: External ear normal.      Left Ear: External ear normal.      Nose: Nose normal.      Mouth/Throat:      Mouth: Mucous membranes are moist.   Eyes:      General:         Right eye: No discharge.         Left eye: No discharge.      Conjunctiva/sclera: Conjunctivae normal.      Pupils: Pupils are equal, round, and reactive to light.   Neck:      Thyroid: No thyromegaly.      Trachea: No tracheal deviation.   Cardiovascular:      Rate and Rhythm: Normal rate and regular rhythm.      Pulses:           Radial pulses are 2+ on the right side and 2+ on the left side.      Heart sounds: S1 normal and S2 normal.   Pulmonary:      Effort: Pulmonary effort is normal. No respiratory distress.      Breath sounds: Normal breath sounds. No wheezing, rhonchi or rales.   Abdominal:      General: Bowel sounds are normal. There is no distension.      Palpations: Abdomen is soft. Abdomen is not rigid. There is no mass.      Tenderness: There is no abdominal tenderness. There is no guarding.   Musculoskeletal:      Cervical back: Normal range of motion and neck supple.   Lymphadenopathy:      Cervical: No cervical adenopathy.   Skin:     General: Skin is warm and dry.      Capillary Refill: Capillary refill takes less than 2 seconds.      Findings: Rash present. Rash is purpuric (various on upper extremities).   Neurological:      Mental Status: He is alert and oriented to person, place, and time.      Cranial Nerves: No cranial nerve deficit.      Sensory: No sensory deficit.      Motor: No atrophy or abnormal muscle tone.      Deep Tendon Reflexes:      Reflex Scores:       Patellar reflexes are 2+ on the right side and 2+ on the left side.  Psychiatric:         Mood and Affect: Mood is anxious.         Speech: Speech is tangential.         Behavior: Behavior is agitated.  Behavior is not hyperactive. Aggressive: however very confrontational.        Thought Content: Thought content does not include homicidal or suicidal ideation.        Component Date Value Ref Range Status    Sodium 03/31/2023 140  136 - 145 mmol/L Final    Potassium 03/31/2023 4.9  3.5 - 5.1 mmol/L Final    Chloride 03/31/2023 103  95 - 110 mmol/L Final    CO2 03/31/2023 29  23 - 29 mmol/L Final    Glucose 03/31/2023 90  70 - 110 mg/dL Final    BUN 03/31/2023 18  8 - 23 mg/dL Final    Creatinine 03/31/2023 0.9  0.5 - 1.4 mg/dL Final    Calcium 03/31/2023 9.4  8.7 - 10.5 mg/dL Final    Total Protein 03/31/2023 6.6  6.0 - 8.4 g/dL Final    Albumin 03/31/2023 4.0  3.5 - 5.2 g/dL Final    Total Bilirubin 03/31/2023 1.2 (H)  0.1 - 1.0 mg/dL Final    Comment: For infants and newborns, interpretation of results should be based  on gestational age, weight and in agreement with clinical  observations.    Premature Infant recommended reference ranges:  Up to 24 hours.............<8.0 mg/dL  Up to 48 hours............<12.0 mg/dL  3-5 days..................<15.0 mg/dL  6-29 days.................<15.0 mg/dL      Alkaline Phosphatase 03/31/2023 49 (L)  55 - 135 U/L Final    AST 03/31/2023 17  10 - 40 U/L Final    ALT 03/31/2023 12  10 - 44 U/L Final    Anion Gap 03/31/2023 8  8 - 16 mmol/L Final    eGFR 03/31/2023 >60  >60 mL/min/1.73 m^2 Final    WBC 03/31/2023 3.93  3.90 - 12.70 K/uL Final    RBC 03/31/2023 3.82 (L)  4.60 - 6.20 M/uL Final    Hemoglobin 03/31/2023 12.0 (L)  14.0 - 18.0 g/dL Final    Hematocrit 03/31/2023 36.1 (L)  40.0 - 54.0 % Final    MCV 03/31/2023 95  82 - 98 fL Final    MCH 03/31/2023 31.4 (H)  27.0 - 31.0 pg Final    MCHC 03/31/2023 33.2  32.0 - 36.0 g/dL Final    RDW 03/31/2023 13.1  11.5 - 14.5 % Final    Platelets 03/31/2023 152  150 - 450 K/uL Final    MPV 03/31/2023 12.0  9.2 - 12.9 fL Final    Immature Granulocytes 03/31/2023 0.3  0.0 - 0.5 % Final    Gran # (ANC) 03/31/2023 1.8  1.8 - 7.7 K/uL  Final    Immature Grans (Abs) 03/31/2023 0.01  0.00 - 0.04 K/uL Final    Comment: Mild elevation in immature granulocytes is non specific and   can be seen in a variety of conditions including stress response,   acute inflammation, trauma and pregnancy. Correlation with other   laboratory and clinical findings is essential.      Lymph # 03/31/2023 1.5  1.0 - 4.8 K/uL Final    Mono # 03/31/2023 0.4  0.3 - 1.0 K/uL Final    Eos # 03/31/2023 0.2  0.0 - 0.5 K/uL Final    Baso # 03/31/2023 0.02  0.00 - 0.20 K/uL Final    nRBC 03/31/2023 0  0 /100 WBC Final    Gran % 03/31/2023 46.2  38.0 - 73.0 % Final    Lymph % 03/31/2023 38.2  18.0 - 48.0 % Final    Mono % 03/31/2023 10.7  4.0 - 15.0 % Final    Eosinophil % 03/31/2023 4.1  0.0 - 8.0 % Final    Basophil % 03/31/2023 0.5  0.0 - 1.9 % Final    Differential Method 03/31/2023 Automated   Final    Cholesterol 03/31/2023 143  120 - 199 mg/dL Final    Comment: The National Cholesterol Education Program (NCEP) has set the  following guidelines (reference ranges) for Cholesterol:  Optimal.....................<200 mg/dL  Borderline High.............200-239 mg/dL  High........................> or = 240 mg/dL      Triglycerides 03/31/2023 56  30 - 150 mg/dL Final    Comment: The National Cholesterol Education Program (NCEP) has set the  following guidelines (reference values) for triglycerides:  Normal......................<150 mg/dL  Borderline High.............150-199 mg/dL  High........................200-499 mg/dL      HDL 03/31/2023 59  40 - 75 mg/dL Final    Comment: The National Cholesterol Education Program (NCEP) has set the  following guidelines (reference values) for HDL Cholesterol:  Low...............<40 mg/dL  Optimal...........>60 mg/dL      LDL Cholesterol 03/31/2023 72.8  63.0 - 159.0 mg/dL Final    Comment: The National Cholesterol Education Program (NCEP) has set the  following guidelines (reference values) for LDL  Cholesterol:  Optimal.......................<130 mg/dL  Borderline High...............130-159 mg/dL  High..........................160-189 mg/dL  Very High.....................>190 mg/dL      HDL/Cholesterol Ratio 03/31/2023 41.3  20.0 - 50.0 % Final    Total Cholesterol/HDL Ratio 03/31/2023 2.4  2.0 - 5.0 Final    Non-HDL Cholesterol 03/31/2023 84  mg/dL Final    Comment: Risk category and Non-HDL cholesterol goals:  Coronary heart disease (CHD)or equivalent (10-year risk of CHD >20%):  Non-HDL cholesterol goal     <130 mg/dL  Two or more CHD risk factors and 10-year risk of CHD <= 20%:  Non-HDL cholesterol goal     <160 mg/dL  0 to 1 CHD risk factor:  Non-HDL cholesterol goal     <190 mg/dL      TSH 03/31/2023 1.884  0.400 - 4.000 uIU/mL Final       Assessment        ICD-10-CM ICD-9-CM   1. Hypertension, benign  I10 401.1   2. Mild episode of recurrent major depressive disorder  F33.0 296.31   3. Anxiety  F41.9 300.00   4. Primary osteoarthritis of both knees  M17.0 715.16   5. Senile purpura  D69.2 287.2   6. Insomnia, unspecified type  G47.00 780.52   7. Benign prostatic hyperplasia with nocturia  N40.1 600.01    R35.1 788.43   8. Normocytic anemia  D64.9 285.9   9. Thrombocytopenia  D69.6 287.5   10. Lumbar radiculopathy  M54.16 724.4   11. Degenerative disc disease, lumbar  M51.36 722.52         Plan:     1. Hypertension, benign  Assessment & Plan:  Fair BP control.  Continue current regimen.      2. Mild episode of recurrent major depressive disorder  Assessment & Plan:  Patient reports some improvement in his depression and anxiety but not complete resolution.  He insists that he needs a higher dose of Xanax.  I informed the patient that I would not increase the benzodiazepine because of risks of over-sedation, and association with dependency.  I strongly encouraged him to make an appointment with Psychiatry as I have several times in the past.  The number was given to him.    Orders:  -     Ambulatory  referral/consult to Psychiatry; Future; Expected date: 04/13/2023    3. Anxiety  Assessment & Plan:  See depression    Orders:  -     Ambulatory referral/consult to Psychiatry; Future; Expected date: 04/13/2023    4. Primary osteoarthritis of both knees  Assessment & Plan:  Strongly advised patient to avoid chronic NSAID use.  Discussed using Tylenol for pain, and encouraged to consider physical therapy.      5. Senile purpura  Assessment & Plan:  Patient has a history of thrombocytopenia.  Will recheck CBC.      6. Insomnia, unspecified type  Assessment & Plan:  Patient continues to request increasing alprazolam dose.  I advised him that I would not increase this because it is benzodiazepine has several implications.  I strongly advised him to make an appointment with Psychiatry.      7. Benign prostatic hyperplasia with nocturia  Assessment & Plan:  Continue Cardura      8. Normocytic anemia  Assessment & Plan:  Anemia workup was ordered.  Patient was scheduled for follow-up appointment in two weeks to review results.    Orders:  -     CBC Auto Differential; Future; Expected date: 04/06/2023  -     Iron and TIBC; Future; Expected date: 04/06/2023  -     Ferritin; Future; Expected date: 04/06/2023  -     Reticulocytes; Future; Expected date: 04/06/2023  -     Hemoglobin Electrophoresis,Hgb A2 Nirav.; Future; Expected date: 04/06/2023    9. Thrombocytopenia  Assessment & Plan:  Patient has a history of thrombocytopenia.  Will recheck CBC      10. Lumbar radiculopathy  Assessment & Plan:  Reports improvement symptoms with duloxetine.  Will continue.      11. Degenerative disc disease, lumbar  Assessment & Plan:  See lumbar radiculopathy.             -Maxx Mcintosh Jr., MD, AAHIVS      This office note has been dictated.  This dictation has been generated using M-Modal Fluency Direct dictation; some phonetic errors may occur.         Patient Instructions   Please call [phone] ([facility]); [phone](  [facility]) to schedule your behavioral health/mental health appointment     Para la proxima semana no tiene que traer la medicinas    Future Appointments   Date Time Provider Department Center   4/13/2023  8:20 AM Maxx Mcintosh Jr., MD [facility]

## 2023-04-11 LAB
HGB A2 MFR BLD HPLC: 2.6 % (ref 2.2–3.2)
HGB FRACT BLD ELPH-IMP: NORMAL
HGB FRACT BLD ELPH-IMP: NORMAL

## 2023-04-13 ENCOUNTER — OFFICE VISIT (OUTPATIENT)
Dept: FAMILY MEDICINE | Facility: CLINIC | Age: 83
End: 2023-04-13
Payer: MEDICARE

## 2023-04-13 VITALS
OXYGEN SATURATION: 95 % | SYSTOLIC BLOOD PRESSURE: 139 MMHG | HEIGHT: 68 IN | TEMPERATURE: 99 F | HEART RATE: 81 BPM | DIASTOLIC BLOOD PRESSURE: 74 MMHG | WEIGHT: 181.19 LBS | BODY MASS INDEX: 27.46 KG/M2

## 2023-04-13 DIAGNOSIS — M47.816 LUMBAR SPONDYLOSIS: Chronic | ICD-10-CM

## 2023-04-13 DIAGNOSIS — F41.1 GENERALIZED ANXIETY DISORDER: Chronic | ICD-10-CM

## 2023-04-13 DIAGNOSIS — M48.061 SPINAL STENOSIS OF LUMBAR REGION, UNSPECIFIED WHETHER NEUROGENIC CLAUDICATION PRESENT: Chronic | ICD-10-CM

## 2023-04-13 DIAGNOSIS — I10 HYPERTENSION, BENIGN: Chronic | ICD-10-CM

## 2023-04-13 DIAGNOSIS — D61.818 PANCYTOPENIA: Primary | ICD-10-CM

## 2023-04-13 DIAGNOSIS — F33.1 MODERATE EPISODE OF RECURRENT MAJOR DEPRESSIVE DISORDER: Chronic | ICD-10-CM

## 2023-04-13 PROCEDURE — 1126F PR PAIN SEVERITY QUANTIFIED, NO PAIN PRESENT: ICD-10-PCS | Mod: HCNC,CPTII,S$GLB, | Performed by: FAMILY MEDICINE

## 2023-04-13 PROCEDURE — 1101F PR PT FALLS ASSESS DOC 0-1 FALLS W/OUT INJ PAST YR: ICD-10-PCS | Mod: HCNC,CPTII,S$GLB, | Performed by: FAMILY MEDICINE

## 2023-04-13 PROCEDURE — 1101F PT FALLS ASSESS-DOCD LE1/YR: CPT | Mod: HCNC,CPTII,S$GLB, | Performed by: FAMILY MEDICINE

## 2023-04-13 PROCEDURE — 3288F FALL RISK ASSESSMENT DOCD: CPT | Mod: HCNC,CPTII,S$GLB, | Performed by: FAMILY MEDICINE

## 2023-04-13 PROCEDURE — 99999 PR PBB SHADOW E&M-EST. PATIENT-LVL V: CPT | Mod: PBBFAC,HCNC,, | Performed by: FAMILY MEDICINE

## 2023-04-13 PROCEDURE — 3078F DIAST BP <80 MM HG: CPT | Mod: HCNC,CPTII,S$GLB, | Performed by: FAMILY MEDICINE

## 2023-04-13 PROCEDURE — 3075F PR MOST RECENT SYSTOLIC BLOOD PRESS GE 130-139MM HG: ICD-10-PCS | Mod: HCNC,CPTII,S$GLB, | Performed by: FAMILY MEDICINE

## 2023-04-13 PROCEDURE — 3078F PR MOST RECENT DIASTOLIC BLOOD PRESSURE < 80 MM HG: ICD-10-PCS | Mod: HCNC,CPTII,S$GLB, | Performed by: FAMILY MEDICINE

## 2023-04-13 PROCEDURE — 1126F AMNT PAIN NOTED NONE PRSNT: CPT | Mod: HCNC,CPTII,S$GLB, | Performed by: FAMILY MEDICINE

## 2023-04-13 PROCEDURE — 99999 PR PBB SHADOW E&M-EST. PATIENT-LVL V: ICD-10-PCS | Mod: PBBFAC,HCNC,, | Performed by: FAMILY MEDICINE

## 2023-04-13 PROCEDURE — 3288F PR FALLS RISK ASSESSMENT DOCUMENTED: ICD-10-PCS | Mod: HCNC,CPTII,S$GLB, | Performed by: FAMILY MEDICINE

## 2023-04-13 PROCEDURE — 99213 OFFICE O/P EST LOW 20 MIN: CPT | Mod: HCNC,S$GLB,, | Performed by: FAMILY MEDICINE

## 2023-04-13 PROCEDURE — 99213 PR OFFICE/OUTPT VISIT, EST, LEVL III, 20-29 MIN: ICD-10-PCS | Mod: HCNC,S$GLB,, | Performed by: FAMILY MEDICINE

## 2023-04-13 PROCEDURE — 3075F SYST BP GE 130 - 139MM HG: CPT | Mod: HCNC,CPTII,S$GLB, | Performed by: FAMILY MEDICINE

## 2023-04-13 NOTE — PATIENT INSTRUCTIONS
Llame al [phone] ([facility]), [phone]( [facility]) para programar kimbrough hina de juliette conductual / juliette mental    Traiga todas chaim medicinas a cada visita.

## 2023-04-25 ENCOUNTER — OFFICE VISIT (OUTPATIENT)
Dept: HEMATOLOGY/ONCOLOGY | Facility: CLINIC | Age: 83
End: 2023-04-25
Payer: MEDICARE

## 2023-04-25 ENCOUNTER — LAB VISIT (OUTPATIENT)
Dept: LAB | Facility: HOSPITAL | Age: 83
End: 2023-04-25
Attending: STUDENT IN AN ORGANIZED HEALTH CARE EDUCATION/TRAINING PROGRAM
Payer: MEDICARE

## 2023-04-25 VITALS
WEIGHT: 182.75 LBS | OXYGEN SATURATION: 98 % | BODY MASS INDEX: 27.7 KG/M2 | DIASTOLIC BLOOD PRESSURE: 88 MMHG | HEART RATE: 64 BPM | SYSTOLIC BLOOD PRESSURE: 151 MMHG | HEIGHT: 68 IN

## 2023-04-25 DIAGNOSIS — F41.1 GENERALIZED ANXIETY DISORDER: Chronic | ICD-10-CM

## 2023-04-25 DIAGNOSIS — D69.6 THROMBOCYTOPENIA: Chronic | ICD-10-CM

## 2023-04-25 DIAGNOSIS — D64.9 ANEMIA, UNSPECIFIED TYPE: ICD-10-CM

## 2023-04-25 DIAGNOSIS — R35.1 BENIGN PROSTATIC HYPERPLASIA WITH NOCTURIA: Chronic | ICD-10-CM

## 2023-04-25 DIAGNOSIS — M48.061 SPINAL STENOSIS OF LUMBAR REGION, UNSPECIFIED WHETHER NEUROGENIC CLAUDICATION PRESENT: Chronic | ICD-10-CM

## 2023-04-25 DIAGNOSIS — D61.818 PANCYTOPENIA: ICD-10-CM

## 2023-04-25 DIAGNOSIS — F19.90 EXCESSIVE USE OF NONSTEROIDAL ANTI-INFLAMMATORY DRUGS (NSAIDS): ICD-10-CM

## 2023-04-25 DIAGNOSIS — N40.1 BENIGN PROSTATIC HYPERPLASIA WITH NOCTURIA: Chronic | ICD-10-CM

## 2023-04-25 DIAGNOSIS — M47.816 LUMBAR SPONDYLOSIS: Chronic | ICD-10-CM

## 2023-04-25 DIAGNOSIS — D61.818 PANCYTOPENIA: Primary | ICD-10-CM

## 2023-04-25 DIAGNOSIS — F33.1 MODERATE EPISODE OF RECURRENT MAJOR DEPRESSIVE DISORDER: Chronic | ICD-10-CM

## 2023-04-25 DIAGNOSIS — M17.0 PRIMARY OSTEOARTHRITIS OF BOTH KNEES: Chronic | ICD-10-CM

## 2023-04-25 DIAGNOSIS — I10 HYPERTENSION, BENIGN: Chronic | ICD-10-CM

## 2023-04-25 DIAGNOSIS — D64.9 NORMOCYTIC ANEMIA: Chronic | ICD-10-CM

## 2023-04-25 LAB
BASOPHILS # BLD AUTO: 0.02 K/UL (ref 0–0.2)
BASOPHILS NFR BLD: 0.5 % (ref 0–1.9)
DIFFERENTIAL METHOD: ABNORMAL
EOSINOPHIL # BLD AUTO: 0.1 K/UL (ref 0–0.5)
EOSINOPHIL NFR BLD: 3.1 % (ref 0–8)
ERYTHROCYTE [DISTWIDTH] IN BLOOD BY AUTOMATED COUNT: 12.7 % (ref 11.5–14.5)
ERYTHROCYTE [SEDIMENTATION RATE] IN BLOOD BY WESTERGREN METHOD: 8 MM/HR (ref 0–10)
FERRITIN SERPL-MCNC: 119 NG/ML (ref 20–300)
HCT VFR BLD AUTO: 37.7 % (ref 40–54)
HGB BLD-MCNC: 12.1 G/DL (ref 14–18)
IMM GRANULOCYTES # BLD AUTO: 0.01 K/UL (ref 0–0.04)
IMM GRANULOCYTES NFR BLD AUTO: 0.2 % (ref 0–0.5)
IRON SERPL-MCNC: 79 UG/DL (ref 45–160)
LYMPHOCYTES # BLD AUTO: 1.2 K/UL (ref 1–4.8)
LYMPHOCYTES NFR BLD: 27.9 % (ref 18–48)
MCH RBC QN AUTO: 30.4 PG (ref 27–31)
MCHC RBC AUTO-ENTMCNC: 32.1 G/DL (ref 32–36)
MCV RBC AUTO: 95 FL (ref 82–98)
MONOCYTES # BLD AUTO: 0.4 K/UL (ref 0.3–1)
MONOCYTES NFR BLD: 9.5 % (ref 4–15)
NEUTROPHILS # BLD AUTO: 2.5 K/UL (ref 1.8–7.7)
NEUTROPHILS NFR BLD: 58.8 % (ref 38–73)
NRBC BLD-RTO: 0 /100 WBC
PATH REV BLD -IMP: NORMAL
PATH REV BLD -IMP: NORMAL
PLATELET # BLD AUTO: 140 K/UL (ref 150–450)
PMV BLD AUTO: 11.9 FL (ref 9.2–12.9)
RBC # BLD AUTO: 3.98 M/UL (ref 4.6–6.2)
RETICS/RBC NFR AUTO: 1.7 % (ref 0.4–2)
SATURATED IRON: 25 % (ref 20–50)
TOTAL IRON BINDING CAPACITY: 320 UG/DL (ref 250–450)
TRANSFERRIN SERPL-MCNC: 216 MG/DL (ref 200–375)
WBC # BLD AUTO: 4.19 K/UL (ref 3.9–12.7)

## 2023-04-25 PROCEDURE — 84238 ASSAY NONENDOCRINE RECEPTOR: CPT | Mod: HCNC | Performed by: STUDENT IN AN ORGANIZED HEALTH CARE EDUCATION/TRAINING PROGRAM

## 2023-04-25 PROCEDURE — 83020 HEMOGLOBIN ELECTROPHORESIS: CPT | Mod: 91,HCNC | Performed by: STUDENT IN AN ORGANIZED HEALTH CARE EDUCATION/TRAINING PROGRAM

## 2023-04-25 PROCEDURE — 3077F SYST BP >= 140 MM HG: CPT | Mod: HCNC,CPTII,S$GLB, | Performed by: STUDENT IN AN ORGANIZED HEALTH CARE EDUCATION/TRAINING PROGRAM

## 2023-04-25 PROCEDURE — 1125F AMNT PAIN NOTED PAIN PRSNT: CPT | Mod: HCNC,CPTII,S$GLB, | Performed by: STUDENT IN AN ORGANIZED HEALTH CARE EDUCATION/TRAINING PROGRAM

## 2023-04-25 PROCEDURE — 3077F PR MOST RECENT SYSTOLIC BLOOD PRESSURE >= 140 MM HG: ICD-10-PCS | Mod: HCNC,CPTII,S$GLB, | Performed by: STUDENT IN AN ORGANIZED HEALTH CARE EDUCATION/TRAINING PROGRAM

## 2023-04-25 PROCEDURE — 84630 ASSAY OF ZINC: CPT | Mod: HCNC | Performed by: STUDENT IN AN ORGANIZED HEALTH CARE EDUCATION/TRAINING PROGRAM

## 2023-04-25 PROCEDURE — 85060 PATHOLOGIST REVIEW: ICD-10-PCS | Mod: HCNC,,, | Performed by: PATHOLOGY

## 2023-04-25 PROCEDURE — 85025 COMPLETE CBC W/AUTO DIFF WBC: CPT | Mod: HCNC | Performed by: STUDENT IN AN ORGANIZED HEALTH CARE EDUCATION/TRAINING PROGRAM

## 2023-04-25 PROCEDURE — 36415 COLL VENOUS BLD VENIPUNCTURE: CPT | Mod: HCNC | Performed by: STUDENT IN AN ORGANIZED HEALTH CARE EDUCATION/TRAINING PROGRAM

## 2023-04-25 PROCEDURE — 81269 HBA1/HBA2 GENE DUP/DEL VRNTS: CPT | Mod: HCNC | Performed by: STUDENT IN AN ORGANIZED HEALTH CARE EDUCATION/TRAINING PROGRAM

## 2023-04-25 PROCEDURE — 99999 PR PBB SHADOW E&M-EST. PATIENT-LVL IV: ICD-10-PCS | Mod: PBBFAC,HCNC,, | Performed by: STUDENT IN AN ORGANIZED HEALTH CARE EDUCATION/TRAINING PROGRAM

## 2023-04-25 PROCEDURE — 99205 OFFICE O/P NEW HI 60 MIN: CPT | Mod: HCNC,S$GLB,, | Performed by: STUDENT IN AN ORGANIZED HEALTH CARE EDUCATION/TRAINING PROGRAM

## 2023-04-25 PROCEDURE — 1159F PR MEDICATION LIST DOCUMENTED IN MEDICAL RECORD: ICD-10-PCS | Mod: HCNC,CPTII,S$GLB, | Performed by: STUDENT IN AN ORGANIZED HEALTH CARE EDUCATION/TRAINING PROGRAM

## 2023-04-25 PROCEDURE — 3079F DIAST BP 80-89 MM HG: CPT | Mod: HCNC,CPTII,S$GLB, | Performed by: STUDENT IN AN ORGANIZED HEALTH CARE EDUCATION/TRAINING PROGRAM

## 2023-04-25 PROCEDURE — 84466 ASSAY OF TRANSFERRIN: CPT | Mod: HCNC | Performed by: STUDENT IN AN ORGANIZED HEALTH CARE EDUCATION/TRAINING PROGRAM

## 2023-04-25 PROCEDURE — 85652 RBC SED RATE AUTOMATED: CPT | Mod: HCNC | Performed by: STUDENT IN AN ORGANIZED HEALTH CARE EDUCATION/TRAINING PROGRAM

## 2023-04-25 PROCEDURE — 3079F PR MOST RECENT DIASTOLIC BLOOD PRESSURE 80-89 MM HG: ICD-10-PCS | Mod: HCNC,CPTII,S$GLB, | Performed by: STUDENT IN AN ORGANIZED HEALTH CARE EDUCATION/TRAINING PROGRAM

## 2023-04-25 PROCEDURE — 99999 PR PBB SHADOW E&M-EST. PATIENT-LVL IV: CPT | Mod: PBBFAC,HCNC,, | Performed by: STUDENT IN AN ORGANIZED HEALTH CARE EDUCATION/TRAINING PROGRAM

## 2023-04-25 PROCEDURE — 83921 ORGANIC ACID SINGLE QUANT: CPT | Mod: HCNC | Performed by: STUDENT IN AN ORGANIZED HEALTH CARE EDUCATION/TRAINING PROGRAM

## 2023-04-25 PROCEDURE — 85045 AUTOMATED RETICULOCYTE COUNT: CPT | Mod: HCNC | Performed by: STUDENT IN AN ORGANIZED HEALTH CARE EDUCATION/TRAINING PROGRAM

## 2023-04-25 PROCEDURE — 85060 BLOOD SMEAR INTERPRETATION: CPT | Mod: HCNC,,, | Performed by: PATHOLOGY

## 2023-04-25 PROCEDURE — 82525 ASSAY OF COPPER: CPT | Mod: HCNC | Performed by: STUDENT IN AN ORGANIZED HEALTH CARE EDUCATION/TRAINING PROGRAM

## 2023-04-25 PROCEDURE — 82728 ASSAY OF FERRITIN: CPT | Mod: HCNC | Performed by: STUDENT IN AN ORGANIZED HEALTH CARE EDUCATION/TRAINING PROGRAM

## 2023-04-25 PROCEDURE — 1159F MED LIST DOCD IN RCRD: CPT | Mod: HCNC,CPTII,S$GLB, | Performed by: STUDENT IN AN ORGANIZED HEALTH CARE EDUCATION/TRAINING PROGRAM

## 2023-04-25 PROCEDURE — 99205 PR OFFICE/OUTPT VISIT, NEW, LEVL V, 60-74 MIN: ICD-10-PCS | Mod: HCNC,S$GLB,, | Performed by: STUDENT IN AN ORGANIZED HEALTH CARE EDUCATION/TRAINING PROGRAM

## 2023-04-25 PROCEDURE — 1125F PR PAIN SEVERITY QUANTIFIED, PAIN PRESENT: ICD-10-PCS | Mod: HCNC,CPTII,S$GLB, | Performed by: STUDENT IN AN ORGANIZED HEALTH CARE EDUCATION/TRAINING PROGRAM

## 2023-04-25 NOTE — PROGRESS NOTES
Hematology- Oncology Clinic Note :     4/25/2023    Chief Complaint   Patient presents with    Pancytopenia    chronic joint pain    Establish Care     Pt refused  services preferring son to translate     HPI  Pt is a 82 y.o. male who  has a past medical history of Anxiety, GERD (gastroesophageal reflux disease), Hypertension, Nuclear sclerosis of both eyes (3/8/2021), and Primary osteoarthritis of both knees (4/9/2023). Who presents as a referral for pancytopenia.  Pt's only complaint at time of exam is chronic joint pain.  Pt cautioned against further PO NSAID use and is agreeable to pancytopenia workup.  All questions answered to his satisfaction.      Active Problem List with Overview Notes    Diagnosis Date Noted    Insomnia 04/09/2023    Normocytic anemia 04/09/2023    Thrombocytopenia 04/09/2023    Primary osteoarthritis of both knees 04/09/2023    Lumbar spinal stenosis 04/09/2023    Lumbar spondylosis 04/09/2023    Senile purpura 11/22/2022    Moderate episode of recurrent major depressive disorder 11/22/2022    Generalized anxiety disorder 11/22/2022    Hypertension, benign 11/22/2022    Refractive error 03/08/2021    Nuclear sclerosis of both eyes 03/08/2021    Benign prostatic hyperplasia with nocturia 12/06/2019    Weakness of trunk musculature 11/27/2017    Poor flexibility of tendon 11/27/2017    Decreased range of motion of hip 11/27/2017    Weakness of both lower extremities 11/27/2017       Patient Active Problem List    Diagnosis Date Noted    Insomnia 04/09/2023    Normocytic anemia 04/09/2023    Thrombocytopenia 04/09/2023    Primary osteoarthritis of both knees 04/09/2023    Lumbar spinal stenosis 04/09/2023    Lumbar spondylosis 04/09/2023    Senile purpura 11/22/2022    Moderate episode of recurrent major depressive disorder 11/22/2022    Generalized anxiety disorder 11/22/2022    Hypertension, benign 11/22/2022    Refractive error 03/08/2021    Nuclear sclerosis of both eyes  03/08/2021    Benign prostatic hyperplasia with nocturia 12/06/2019    Weakness of trunk musculature 11/27/2017    Poor flexibility of tendon 11/27/2017    Decreased range of motion of hip 11/27/2017    Weakness of both lower extremities 11/27/2017     Past Medical History:   Diagnosis Date    Anxiety     GERD (gastroesophageal reflux disease)     Hypertension     Nuclear sclerosis of both eyes 3/8/2021    Primary osteoarthritis of both knees 4/9/2023      No past surgical history on file.   (Not in a hospital admission)    Review of patient's allergies indicates:  No Known Allergies   Social History     Tobacco Use    Smoking status: Never    Smokeless tobacco: Never   Substance Use Topics    Alcohol use: Not Currently      Family History   Problem Relation Age of Onset    No Known Problems Mother     No Known Problems Father     No Known Problems Sister     No Known Problems Brother     No Known Problems Maternal Aunt     No Known Problems Maternal Uncle     No Known Problems Paternal Aunt     No Known Problems Paternal Uncle     No Known Problems Maternal Grandmother     No Known Problems Maternal Grandfather     No Known Problems Paternal Grandmother     No Known Problems Paternal Grandfather     Amblyopia Neg Hx     Blindness Neg Hx     Cancer Neg Hx     Cataracts Neg Hx     Diabetes Neg Hx     Glaucoma Neg Hx     Hypertension Neg Hx     Macular degeneration Neg Hx     Retinal detachment Neg Hx     Strabismus Neg Hx     Stroke Neg Hx     Thyroid disease Neg Hx     Colon cancer Neg Hx     Esophageal cancer Neg Hx         Review of Systems :  Review of Systems   Constitutional:  Negative for fever, malaise/fatigue and weight loss.   HENT:  Negative for congestion and hearing loss.    Eyes:  Negative for blurred vision and pain.   Respiratory:  Negative for cough, sputum production and shortness of breath.    Cardiovascular:  Negative for chest pain, palpitations and claudication.   Gastrointestinal:  Negative for  abdominal pain, blood in stool, constipation, diarrhea, heartburn, nausea and vomiting.   Genitourinary:  Negative for dysuria and hematuria.   Musculoskeletal:  Positive for back pain, joint pain and myalgias. Negative for falls and neck pain.   Skin:  Negative for itching and rash.   Neurological:  Negative for dizziness, focal weakness and weakness.   Endo/Heme/Allergies:  Does not bruise/bleed easily.   Psychiatric/Behavioral:  Negative for depression. The patient is not nervous/anxious.      Physical Exam :  Wt Readings from Last 3 Encounters:   04/13/23 82.2 kg (181 lb 3.5 oz)   04/06/23 82.3 kg (181 lb 7 oz)   11/22/22 83.5 kg (184 lb 1.4 oz)     Temp Readings from Last 3 Encounters:   04/13/23 99.1 °F (37.3 °C) (Oral)   04/06/23 97.8 °F (36.6 °C) (Oral)   11/22/22 97.7 °F (36.5 °C) (Oral)     BP Readings from Last 3 Encounters:   04/13/23 139/74   04/06/23 139/68   12/12/22 138/78     Pulse Readings from Last 3 Encounters:   04/13/23 81   04/06/23 67   12/12/22 68     There is no height or weight on file to calculate BMI.    Physical Exam  Vitals reviewed.   Constitutional:       Appearance: Normal appearance.      Comments: Uses walker   HENT:      Head: Normocephalic and atraumatic.      Nose: Nose normal.      Mouth/Throat:      Mouth: Mucous membranes are moist.   Eyes:      Pupils: Pupils are equal, round, and reactive to light.   Cardiovascular:      Rate and Rhythm: Normal rate.      Heart sounds: Normal heart sounds.   Pulmonary:      Effort: Pulmonary effort is normal.      Breath sounds: Normal breath sounds.   Abdominal:      General: Abdomen is flat.   Musculoskeletal:      Cervical back: Neck supple.      Right lower leg: No edema.      Left lower leg: No edema.   Skin:     General: Skin is warm and dry.   Neurological:      Mental Status: He is alert. Mental status is at baseline.   Psychiatric:         Mood and Affect: Mood normal.         Behavior: Behavior normal.         Pertinent  Diagnostic studies:    No results found for this or any previous visit (from the past 24 hour(s)).    Assessment/Plan :       Pancytopenia  -Mild and worsening this past year  -Will do anemia workup, most likely multifactorial but could primarily be due to heavy NSAID use  -Will do full anemia workup  -RTC in 7 weeks for MD visit and to review labs      Hypertension, benign  -Not well controlled on current meds  -Could be influenced by NSAID use  -PCP working to adjust       Hx of depression/Anxiety  -Denies SI or HI  -On cymbalta  -Per PCP        Benign prostatic hyperplasia with nocturia  - Controlled on doxazosin  -Per PCP        Primary osteoarthritis of both knees  - Partially controled on Voltaren gel  -Per PCP       Lumbar radiculopathy/Degenerative disc disease, lumbar/Chronic midline low back pain without sciatica  -Chronic   -On gabapentin  -Pt considering alternative pain meds and agreeable to think about pain management, clinic number provided  -Per PCP          Time spent on case: 60 minutes      Summary of orders placed this encounter:  No orders of the defined types were placed in this encounter.      Future Appointments   Date Time Provider Department Center   4/25/2023 10:00 AM Henrique Patton MD [facility] HEM ONC [facility]   9/15/2023  8:30 AM LAB, [facility] DRAW STATION [facility] LAB [facility]   9/20/2023  9:00 AM Maxx Mcintosh Jr., MD [facility] -            Henrique Patton MD   Hematology/oncology, [facility] [place]

## 2023-04-25 NOTE — Clinical Note
-Please get all labs I ordered on way out -RTC in 7 weeks for MD visit -Please give pt number to pain medicine clinic

## 2023-04-26 LAB — STFR SERPL-MCNC: 2 MG/L (ref 1.8–4.6)

## 2023-04-27 LAB
HB ELECTROPHORESIS INTERP CANCEL: NORMAL
HB ELECTROPHORESIS INTERPRETATION: NORMAL
HGB A MFR BLD ELPH: 97.5 % (ref 95.8–98)
HGB A2 MFR BLD: 2.5 % (ref 2–3.3)
HGB A2+XXX MFR BLD ELPH: NORMAL %
HGB F MFR BLD: 0 % (ref 0–0.9)
HGB XXX MFR BLD ELPH: NORMAL %
HPLC HB VARIANT: NORMAL

## 2023-04-28 LAB
COPPER SERPL-MCNC: 805 UG/L (ref 665–1480)
ZINC SERPL-MCNC: 67 UG/DL (ref 60–130)

## 2023-04-29 LAB — METHYLMALONATE SERPL-SCNC: 0.17 UMOL/L

## 2023-04-30 NOTE — PROGRESS NOTES
"  Patient Name: Javid Daniel    : 1940  MRN: [id number]      Subjective:     Patient ID: Javid is a 82 y.o. male    Chief Complaint:  Results    82-year-old male with chronic is as per problem list comes in for review of lab results.  Since his last visit, he continues to use NSAIDs despite being advised not to.  Is also very upset that I would not give him benzodiazepines higher doses the and has not made an appointment with Psychiatry either.       Review of Systems   Constitutional:  Negative for fever.   Respiratory:  Negative for shortness of breath.    Cardiovascular:  Negative for chest pain and palpitations.   Gastrointestinal:  Negative for abdominal pain and blood in stool.   Genitourinary:  Negative for dysuria.   Musculoskeletal:  Positive for back pain (improved) and leg pain (improved).   Integumentary:  Negative for pallor.   Neurological:  Negative for dizziness, light-headedness, numbness and headaches.   Hematological:  Does not bruise/bleed easily.   Psychiatric/Behavioral:  Positive for decreased concentration, dysphoric mood and sleep disturbance. Negative for confusion, hallucinations, self-injury and suicidal ideas. The patient is nervous/anxious.       Objective:   /74 (BP Location: Left arm, Patient Position: Sitting, BP Method: Medium (Manual))   Pulse 81   Temp 99.1 °F (37.3 °C) (Oral)   Ht 5' 8" (1.727 m)   Wt 82.2 kg (181 lb 3.5 oz)   SpO2 95%   BMI 27.55 kg/m²     Physical Exam  Vitals reviewed.   Constitutional:       General: He is not in acute distress.     Appearance: He is well-developed. He is not diaphoretic.   HENT:      Head: Normocephalic and atraumatic.      Right Ear: External ear normal.      Left Ear: External ear normal.      Nose: Nose normal.      Mouth/Throat:      Mouth: Mucous membranes are moist.   Eyes:      General:         Right eye: No discharge.         Left eye: No discharge.      Conjunctiva/sclera: Conjunctivae normal.      Pupils: " Pupils are equal, round, and reactive to light.   Neck:      Thyroid: No thyromegaly.      Trachea: No tracheal deviation.   Cardiovascular:      Rate and Rhythm: Normal rate and regular rhythm.      Pulses:           Radial pulses are 2+ on the right side and 2+ on the left side.      Heart sounds: S1 normal and S2 normal.   Pulmonary:      Effort: Pulmonary effort is normal. No respiratory distress.      Breath sounds: Normal breath sounds. No wheezing, rhonchi or rales.   Abdominal:      General: Bowel sounds are normal. There is no distension.      Palpations: Abdomen is soft. Abdomen is not rigid. There is no mass.      Tenderness: There is no abdominal tenderness. There is no guarding.   Musculoskeletal:      Cervical back: Normal range of motion and neck supple.   Lymphadenopathy:      Cervical: No cervical adenopathy.   Skin:     General: Skin is warm and dry.      Capillary Refill: Capillary refill takes less than 2 seconds.      Findings: Rash present. Rash is purpuric (various on upper extremities).   Neurological:      Mental Status: He is alert and oriented to person, place, and time.      Cranial Nerves: No cranial nerve deficit.      Sensory: No sensory deficit.      Motor: No atrophy or abnormal muscle tone.      Deep Tendon Reflexes:      Reflex Scores:       Patellar reflexes are 2+ on the right side and 2+ on the left side.  Psychiatric:         Mood and Affect: Mood is anxious.         Speech: Speech is tangential.         Behavior: Behavior is agitated. Behavior is not hyperactive. Aggressive: however very confrontational.        Thought Content: Thought content does not include homicidal or suicidal ideation.      Assessment        ICD-10-CM ICD-9-CM   1. Pancytopenia  D61.818 284.19   2. Lumbar spondylosis  M47.816 721.3   3. Spinal stenosis of lumbar region, unspecified whether neurogenic claudication present  M48.061 724.02   4. Moderate episode of recurrent major depressive disorder  F33.1  296.32   5. Generalized anxiety disorder  F41.1 300.02   6. Hypertension, benign  I10 401.1         Plan:     1. Pancytopenia  -     Ambulatory referral/consult to Hematology / Oncology; Future; Expected date: 04/20/2023  Labs reviewed and the show worse and anemia.  Discussed with patient it is urgent he stopped using NSAIDs.  Referral to Hematology placed    2. Lumbar spondylosis/ 3. Spinal stenosis of lumbar region, unspecified whether neurogenic claudication present  -     Ambulatory referral/consult to Physical/Occupational Therapy; Future; Expected date: 04/20/2023  He declined appointment with how pain management, but will consider appointment with physical therapy.    4. Moderate episode of recurrent major depressive disorder/ 5. Generalized anxiety disorder  Patient keeps insist thing on higher doses of his a benzodiazepine.  I informed the patient that I would not do this and I referred him to the last visit after visit summary where I gave number to Psychiatry.  Number was once again provided to him to make an appointment.    6. Hypertension, benign  Blood pressure not at goal.  Likely secondary to heavy NSAID use.  I reviewed reviewed with the patient and how high heavy NSAID use can contribute to poor blood pressure control         -Maxx Mcintosh Jr., MD, AAHIVS      This office note has been dictated.  This dictation has been generated using M-Modal Fluency Direct dictation; some phonetic errors may occur.         Patient Instructions   Alinaannette al [phone] ([facility]), [phone]( [facility] [place]) para programar kimbrough hina de juliette conductual / juliette mental    Traiga todas chaim medicinas a cada visita.    Future Appointments   Date Time Provider Department Center   5/1/2023  3:00 PM Edouard Dallas,  [facility] [place]   6/13/2023 11:00 AM Henrique Patton MD [facility] HEM ONC [facility]   9/15/2023  8:30 AM LAB, [facility] DRAW STATION [facility]   9/20/2023  9:00 AM Maxx Mcintosh Jr., MD  [facility]

## 2023-05-01 ENCOUNTER — OFFICE VISIT (OUTPATIENT)
Dept: PAIN MEDICINE | Facility: CLINIC | Age: 83
End: 2023-05-01
Payer: MEDICARE

## 2023-05-01 VITALS
DIASTOLIC BLOOD PRESSURE: 76 MMHG | HEIGHT: 68 IN | BODY MASS INDEX: 27.58 KG/M2 | SYSTOLIC BLOOD PRESSURE: 136 MMHG | RESPIRATION RATE: 18 BRPM | WEIGHT: 182 LBS | HEART RATE: 62 BPM

## 2023-05-01 DIAGNOSIS — M48.062 SPINAL STENOSIS OF LUMBAR REGION WITH NEUROGENIC CLAUDICATION: Primary | ICD-10-CM

## 2023-05-01 DIAGNOSIS — M51.36 DDD (DEGENERATIVE DISC DISEASE), LUMBAR: ICD-10-CM

## 2023-05-01 DIAGNOSIS — D69.6 THROMBOCYTOPENIA: Chronic | ICD-10-CM

## 2023-05-01 DIAGNOSIS — R29.898 WEAKNESS OF BOTH LOWER EXTREMITIES: ICD-10-CM

## 2023-05-01 DIAGNOSIS — F41.1 GENERALIZED ANXIETY DISORDER: Chronic | ICD-10-CM

## 2023-05-01 DIAGNOSIS — M54.16 LUMBAR RADICULOPATHY: ICD-10-CM

## 2023-05-01 PROCEDURE — 3075F PR MOST RECENT SYSTOLIC BLOOD PRESS GE 130-139MM HG: ICD-10-PCS | Mod: CPTII,S$GLB,, | Performed by: STUDENT IN AN ORGANIZED HEALTH CARE EDUCATION/TRAINING PROGRAM

## 2023-05-01 PROCEDURE — 1125F PR PAIN SEVERITY QUANTIFIED, PAIN PRESENT: ICD-10-PCS | Mod: CPTII,S$GLB,, | Performed by: STUDENT IN AN ORGANIZED HEALTH CARE EDUCATION/TRAINING PROGRAM

## 2023-05-01 PROCEDURE — 1159F PR MEDICATION LIST DOCUMENTED IN MEDICAL RECORD: ICD-10-PCS | Mod: CPTII,S$GLB,, | Performed by: STUDENT IN AN ORGANIZED HEALTH CARE EDUCATION/TRAINING PROGRAM

## 2023-05-01 PROCEDURE — 1160F PR REVIEW ALL MEDS BY PRESCRIBER/CLIN PHARMACIST DOCUMENTED: ICD-10-PCS | Mod: CPTII,S$GLB,, | Performed by: STUDENT IN AN ORGANIZED HEALTH CARE EDUCATION/TRAINING PROGRAM

## 2023-05-01 PROCEDURE — 1159F MED LIST DOCD IN RCRD: CPT | Mod: CPTII,S$GLB,, | Performed by: STUDENT IN AN ORGANIZED HEALTH CARE EDUCATION/TRAINING PROGRAM

## 2023-05-01 PROCEDURE — 3288F FALL RISK ASSESSMENT DOCD: CPT | Mod: CPTII,S$GLB,, | Performed by: STUDENT IN AN ORGANIZED HEALTH CARE EDUCATION/TRAINING PROGRAM

## 2023-05-01 PROCEDURE — 3075F SYST BP GE 130 - 139MM HG: CPT | Mod: CPTII,S$GLB,, | Performed by: STUDENT IN AN ORGANIZED HEALTH CARE EDUCATION/TRAINING PROGRAM

## 2023-05-01 PROCEDURE — 99205 OFFICE O/P NEW HI 60 MIN: CPT | Mod: S$GLB,,, | Performed by: STUDENT IN AN ORGANIZED HEALTH CARE EDUCATION/TRAINING PROGRAM

## 2023-05-01 PROCEDURE — 3078F PR MOST RECENT DIASTOLIC BLOOD PRESSURE < 80 MM HG: ICD-10-PCS | Mod: CPTII,S$GLB,, | Performed by: STUDENT IN AN ORGANIZED HEALTH CARE EDUCATION/TRAINING PROGRAM

## 2023-05-01 PROCEDURE — 1160F RVW MEDS BY RX/DR IN RCRD: CPT | Mod: CPTII,S$GLB,, | Performed by: STUDENT IN AN ORGANIZED HEALTH CARE EDUCATION/TRAINING PROGRAM

## 2023-05-01 PROCEDURE — 1125F AMNT PAIN NOTED PAIN PRSNT: CPT | Mod: CPTII,S$GLB,, | Performed by: STUDENT IN AN ORGANIZED HEALTH CARE EDUCATION/TRAINING PROGRAM

## 2023-05-01 PROCEDURE — 1101F PT FALLS ASSESS-DOCD LE1/YR: CPT | Mod: CPTII,S$GLB,, | Performed by: STUDENT IN AN ORGANIZED HEALTH CARE EDUCATION/TRAINING PROGRAM

## 2023-05-01 PROCEDURE — 3078F DIAST BP <80 MM HG: CPT | Mod: CPTII,S$GLB,, | Performed by: STUDENT IN AN ORGANIZED HEALTH CARE EDUCATION/TRAINING PROGRAM

## 2023-05-01 PROCEDURE — 99205 PR OFFICE/OUTPT VISIT, NEW, LEVL V, 60-74 MIN: ICD-10-PCS | Mod: S$GLB,,, | Performed by: STUDENT IN AN ORGANIZED HEALTH CARE EDUCATION/TRAINING PROGRAM

## 2023-05-01 PROCEDURE — 3288F PR FALLS RISK ASSESSMENT DOCUMENTED: ICD-10-PCS | Mod: CPTII,S$GLB,, | Performed by: STUDENT IN AN ORGANIZED HEALTH CARE EDUCATION/TRAINING PROGRAM

## 2023-05-01 PROCEDURE — 99999 PR PBB SHADOW E&M-EST. PATIENT-LVL IV: CPT | Mod: PBBFAC,,, | Performed by: STUDENT IN AN ORGANIZED HEALTH CARE EDUCATION/TRAINING PROGRAM

## 2023-05-01 PROCEDURE — 99999 PR PBB SHADOW E&M-EST. PATIENT-LVL IV: ICD-10-PCS | Mod: PBBFAC,,, | Performed by: STUDENT IN AN ORGANIZED HEALTH CARE EDUCATION/TRAINING PROGRAM

## 2023-05-01 PROCEDURE — 1101F PR PT FALLS ASSESS DOC 0-1 FALLS W/OUT INJ PAST YR: ICD-10-PCS | Mod: CPTII,S$GLB,, | Performed by: STUDENT IN AN ORGANIZED HEALTH CARE EDUCATION/TRAINING PROGRAM

## 2023-05-01 RX ORDER — GABAPENTIN 600 MG/1
600 TABLET ORAL 3 TIMES DAILY
Qty: 270 TABLET | Refills: 3 | Status: SHIPPED | OUTPATIENT
Start: 2023-05-01 | End: 2023-12-21 | Stop reason: SDUPTHER

## 2023-05-01 NOTE — PROGRESS NOTES
Chronic Pain - New Consult    Referring Physician: No ref. provider found    Date: 05/01/2023     Re: Javid Daniel  MR#: [id number]  YOB: 1940  Age: 82 y.o.    Chief Complaint:   Chief Complaint   Patient presents with    Leg Pain     Left leg    Low-back Pain     **This note is dictated using the M*Modal Fluency Direct word recognition program. There are word recognition mistakes that are occasionally missed on review.**    ASSESSMENT: 82 y.o. year old male with left sided back and leg pain, consistent with     1. Spinal stenosis of lumbar region with neurogenic claudication  gabapentin (NEURONTIN) 600 MG tablet    Ambulatory referral/consult to [facility]      2. Lumbar radiculopathy        3. DDD (degenerative disc disease), lumbar        4. Weakness of both lower extremities        5. Generalized anxiety disorder        6. Thrombocytopenia          PLAN:     Lumbar spinal stenosis with radiculopathy, neurogenic claudication and DDD  -Okay to continue tylenol 1000mg BID-TIDF  -healthy back referral  -he was on PRN hydrocodone 5mg  -continue Prn back brace  -epidural injections not recommended at this time given then were not overly helpful in the past and he is being worked up for pancytopenia  -Will defer and surgical referral until pancytopenia complete  -increase gabapentin to 600mg TID  -MRI reviewed with the patient and his son.    Pancytopenia  -workup ongoing  -patient's son states the patient was recommended to stop opioids and NSAIDs due to pancytopenia. I discussed with the patient that he should stop this then.    - RTC 3 months  - Counseled patient regarding the importance of weight loss and activity modification and physical therapy.    The above plan and management options were discussed at length with patient. Patient is in agreement with the above and verbalized understanding. It will be communicated with the referring physician via electronic record, fax, or mail.   Lab/study reports reviewed were important and necessary because subsequent medical and treatment recommendations required review of the above lab/study reports. Images viewed/reviewed above were important and necessary because subsequent medical and treatment recommendations required review of the reviewed image(s).     Electronically signed by:  Edouard Dallas DO  05/01/2023    Patient was seen in clinic today.  The total amount of time spent on this patient was greater than 1 hour.  Due to medical complexity and multiple issues discussed/addressed I am billing for a level 5 visit. This includes face to face time and non-face to face time preparing to see the patient by reviewing previous labs/imaging, obtaining and/or reviewing separately obtained history, documenting clinical information in the electronic or other health record, independently reviewing results and communicating results to the patient/family/caregiver.  =========================================================================================================    Scottish speaking  was present. Son also assisted with translation.    SUBJECTIVE:    Javid Daniel is a 82 y.o. male presents to the clinic for the evaluation of  Lower Back Pain pain. The pain started 6 years ago following no inciting event and symptoms have been worsening. The pain is located in the left side low back and radiates to the left leg.  When it radiates down the left leg it does not go below the knee. Most of the time the pain stays in the back.  Has been ongoing for years but worse recently.  It is worse with bending forward and lifting.  Sitting helps. Laying down makes it feel better.  When he takes his medicine, he can walk and he is fine.  If he does not take his medicine then the pain is bad.     He is seeing Dr. Arrington in orthopedics. He gets cortisone injections from Dr. Arrington.  He has gotten epidural injections in the past but not  recently.    He is currently being worked up for pancytopenia by Hematology.    Pain Description:    The pain is located in the Lower back  area and radiates to the left leg .    At BEST  6/10   At WORST  8/10 on the WORST day.    On average pain is rated as 6/10.   Today the pain is rated as 9/10  The pain is intermittent.  The pain is described as aching    Symptoms interfere with daily activity.   Exacerbating factors: Sitting and Standing.    Mitigating factors medications.   He reports 3   hours of sleep per night.    Physical Therapy/Home Exercise:  completed physical therapy    Current Pain Medications:    - Hydrocodone 5mg (stopped), Tylenol BID, gabapentin 300mg TID, Duloxetine 20mg QD    Failed Pain Medications:    - diclofenac 50mg. Cannot take NSAIDs due to pancytopenia, Hydrocodone 5mg (stopped)    Pain Treatment Therapies:    Pain procedures:   Epidural injections with Dr. Arrington - helps but doesn't last very long. A couple weeks only.  Physical Therapy: last PT for back was 3-4 years ago  Chiropractor: none  Acupuncture: none  TENS unit: none  Spinal decompression: none  Joint replacement: none    Patient denies urinary incontinence and bowel incontinence.  Patient denies any suicidal or homicidal ideations     report:  Reviewed and consistent with medication use as prescribed.    Imaging:   MRI lumbar 06/2022:  FINDINGS:  The distal cord/conus demonstrates normal size and signal.  No evidence osteomyelitis, marrow replacement process, or acute fracture.  Bilateral renal cysts, noting limited assessment without IV contrast.     At L2-3, there is moderate disc bulging and facet joint arthropathy, resulting in mild spinal canal stenosis and moderate left and mild right-sided neural foraminal narrowing.     At L3-4, there is moderate disc bulging and facet joint arthropathy, resulting in moderate spinal canal stenosis and moderate bilateral neural foraminal narrowing.     At L5-S1, there is prominent  disc bulging and advanced facet joint arthropathy, resulting in severe spinal canal stenosis and moderate/severe bilateral neural foraminal narrowing.     At L5-S1, there is moderate disc bulging and facet joint arthropathy, resulting in mild spinal canal stenosis.  There is advanced right-sided degenerative disc disease, noting marked disc height loss with sub endplate marrow changes.  This results in severe right and moderate left-sided neural foraminal narrowing.     Impression:     Lumbar spondylosis, resulting in severe spinal canal stenosis at L4-5, moderate spinal canal stenosis at L3-4, and moderate/ severe neural foraminal narrowing from L2-3 through L5-S1, as above.    Past Medical History:   Diagnosis Date    Anxiety     GERD (gastroesophageal reflux disease)     Hypertension     Nuclear sclerosis of both eyes 3/8/2021    Primary osteoarthritis of both knees 4/9/2023     No past surgical history on file.  Social History     Socioeconomic History    Marital status:    Tobacco Use    Smoking status: Never    Smokeless tobacco: Never   Substance and Sexual Activity    Alcohol use: Not Currently    Drug use: Never    Sexual activity: Yes     Family History   Problem Relation Age of Onset    No Known Problems Mother     No Known Problems Father     No Known Problems Sister     No Known Problems Brother     No Known Problems Maternal Aunt     No Known Problems Maternal Uncle     No Known Problems Paternal Aunt     No Known Problems Paternal Uncle     No Known Problems Maternal Grandmother     No Known Problems Maternal Grandfather     No Known Problems Paternal Grandmother     No Known Problems Paternal Grandfather     Amblyopia Neg Hx     Blindness Neg Hx     Cancer Neg Hx     Cataracts Neg Hx     Diabetes Neg Hx     Glaucoma Neg Hx     Hypertension Neg Hx     Macular degeneration Neg Hx     Retinal detachment Neg Hx     Strabismus Neg Hx     Stroke Neg Hx     Thyroid disease Neg Hx     Colon cancer Neg  Hx     Esophageal cancer Neg Hx        Review of patient's allergies indicates:  No Known Allergies    Current Outpatient Medications   Medication Sig    ALPRAZolam (XANAX) 0.5 MG tablet Take 1 tablet (0.5 mg total) by mouth nightly as needed for Insomnia.    amLODIPine (NORVASC) 10 MG tablet Take 1 tablet (10 mg total) by mouth once daily.    busPIRone (BUSPAR) 15 MG tablet Take 1 tablet (15 mg total) by mouth 3 (three) times daily.    carvediloL (COREG) 12.5 MG tablet Take 1 tablet (12.5 mg total) by mouth 2 (two) times daily with meals.    diclofenac sodium (VOLTAREN) 1 % Gel APPLY 3 TO 4 GRAMS ONTO THE AFFECTED AREA OF THE SKIN BID    doxazosin (CARDURA) 4 MG tablet Take 1 tablet (4 mg total) by mouth every evening.    DULoxetine (CYMBALTA) 20 MG capsule Take 1 capsule (20 mg total) by mouth once daily.    fluticasone propionate (FLONASE) 50 mcg/actuation nasal spray SHAKE LIQUID AND USE 2 SPRAYS(100 MCG) IN EACH NOSTRIL EVERY DAY    olmesartan (BENICAR) 40 MG tablet Take 1 tablet (40 mg total) by mouth once daily.    pantoprazole (PROTONIX) 40 MG tablet Take 1 tablet (40 mg total) by mouth once daily.    diclofenac (VOLTAREN) 50 MG EC tablet Take 50 mg by mouth 2 (two) times daily with meals.    gabapentin (NEURONTIN) 600 MG tablet Take 1 tablet (600 mg total) by mouth 3 (three) times daily. Take 2 or 3 times a day.    HYDROcodone-acetaminophen (NORCO) 5-325 mg per tablet Take 1 tablet by mouth daily as needed.     No current facility-administered medications for this visit.       REVIEW OF SYSTEMS:    GENERAL:  No weight loss, malaise or fevers.  HEENT:   No recent changes in vision or hearing  NECK:  Negative for lumps, no difficulty with swallowing.  RESPIRATORY:  Negative for cough, wheezing or shortness of breath, patient denies any recent URI.  CARDIOVASCULAR:  Negative for chest pain, leg swelling or palpitations.  GI:  Negative for abdominal discomfort, blood in stools or black stools or change in bowel  "habits.  MUSCULOSKELETAL:  See HPI.  SKIN:  Negative for lesions, rash, and itching.  PSYCH:  No mood disorder or recent psychosocial stressors.  Patients sleep is not disturbed secondary to pain.  HEMATOLOGY/LYMPHOLOGY:  Negative for prolonged bleeding, bruising easily or swollen nodes.  Patient is not currently taking any anti-coagulants  NEURO:   No history of headaches, syncope, paralysis, seizures or tremors.  All other reviewed and negative other than HPI.    OBJECTIVE:    /76 (BP Location: Left arm, Patient Position: Sitting, BP Method: Medium (Automatic))   Pulse 62   Resp 18   Ht 5' 8" (1.727 m)   Wt 82.6 kg (182 lb)   BMI 27.67 kg/m²     PHYSICAL EXAMINATION:    GENERAL: Well appearing, in no acute distress, alert and oriented x3.  PSYCH:  Mood and affect appropriate.  SKIN: Skin color, texture, turgor normal, no rashes or lesions.  HEAD/FACE:  Normocephalic, atraumatic. Cranial nerves grossly intact.  CV: RRR with palpation of the radial artery.  PULM: CTAB. No evidence of respiratory difficulty, symmetric chest rise.  GI:  Soft and non-tender.    BACK:   - No obvious deformity or signs of trauma, Normal lumbar lordotic curve  - Negative spinous process tenderness  - Negative paravertebral tenderness  - Negative pain to palpation over the facet joints of the lumbar spine.   - Positive left glut  - Slump test is Negative for radicular pain  - Slump test is Negative for back pain  - Supine Straight leg raising is Negative for radicular pain  - Supine Straight leg raising is Negative for back pain  - Lumbar ROM is diminished in Flexion with pain  - Lumbar ROM is normal in Extension without pain  - Lumbar ROM is diminished in Lateral Flexion without pain  - Positive Sustained Hip Flexion test (for discogenic pain)  - Positive Altered Gait, Posture  - Axial facet loading test Negative on the bilateral side(s)    SI Joint exam:  - Negative SI joint tenderness to palpation  - Anant's sign " Negative  - Yeoman's Test: Did not perform for SI joint pain indicating anterior SI ligament involvement. Did not perform for anterior thigh pain/paresthesia which indicates femoral nerve stretch.  - Gaenslen's Test:Negative  - Finger Abraham's Sign:Negative  - SI compression test:Did not perform  - SI distraction test:Negative  - Thigh Thrust: Negative  - SI Thrust: Did not perform    MUSKULOSKELETAL:    EXTREMITIES:   Hip Exam:  - Log Roll Negative  - FADIR Negative  - Stinchfield Did not perform  - Hip Scour Negative  - GTB Tenderness Negative      MUSCULOSKELETAL:  No atrophy or tone abnormalities are noted in the UE or LE.  No deformities, edema, or skin discoloration are noted on visible skin. Good capillary refill.    NEURO: Bilateral upper and lower extremity coordination and muscle stretch reflexes are physiologic and symmetric.      NEUROLOGICAL EXAM:  MENTAL STATUS: A x O x 3, good concentration, speech is fluent and goal directed  MEMORY: recent and remote are intact  CN: CN2-12 grossly intact  MOTOR: 5/5 in all muscle groups of the LE  DTRs: 3+ intact symmetric  Sensation:    -no Loss of sensation in a left lower and right lower L-1, L-2, L-3, L-4, and L-5 bilaterally distribution.  Babinski: absent on the bilateral side(s)  Clonus: absent on the bilateral side(s)

## 2023-05-08 LAB
ALPHA GLOBIN RELEASED BY: NORMAL
ALPHA-GLOBIN SPECIMEN: NORMAL
GENETICIST REVIEW: NORMAL
HBA1 GENE MUT ANL BLD/T: NEGATIVE
HBA1 GENE MUT ANL BLD/T: NORMAL
REF LAB TEST METHOD: NORMAL
SERVICE CMNT-IMP: NORMAL
SPECIMEN SOURCE: NORMAL

## 2023-06-08 ENCOUNTER — PES CALL (OUTPATIENT)
Dept: ADMINISTRATIVE | Facility: CLINIC | Age: 83
End: 2023-06-08
Payer: MEDICARE

## 2023-06-09 ENCOUNTER — OFFICE VISIT (OUTPATIENT)
Dept: OPTOMETRY | Facility: CLINIC | Age: 83
End: 2023-06-09
Payer: MEDICARE

## 2023-06-09 DIAGNOSIS — H00.012 HORDEOLUM EXTERNUM OF RIGHT LOWER EYELID: Primary | ICD-10-CM

## 2023-06-09 PROCEDURE — 3288F FALL RISK ASSESSMENT DOCD: CPT | Mod: CPTII,S$GLB,, | Performed by: OPTOMETRIST

## 2023-06-09 PROCEDURE — 1160F PR REVIEW ALL MEDS BY PRESCRIBER/CLIN PHARMACIST DOCUMENTED: ICD-10-PCS | Mod: CPTII,S$GLB,, | Performed by: OPTOMETRIST

## 2023-06-09 PROCEDURE — 1125F PR PAIN SEVERITY QUANTIFIED, PAIN PRESENT: ICD-10-PCS | Mod: CPTII,S$GLB,, | Performed by: OPTOMETRIST

## 2023-06-09 PROCEDURE — 1160F RVW MEDS BY RX/DR IN RCRD: CPT | Mod: CPTII,S$GLB,, | Performed by: OPTOMETRIST

## 2023-06-09 PROCEDURE — 1101F PR PT FALLS ASSESS DOC 0-1 FALLS W/OUT INJ PAST YR: ICD-10-PCS | Mod: CPTII,S$GLB,, | Performed by: OPTOMETRIST

## 2023-06-09 PROCEDURE — 3288F PR FALLS RISK ASSESSMENT DOCUMENTED: ICD-10-PCS | Mod: CPTII,S$GLB,, | Performed by: OPTOMETRIST

## 2023-06-09 PROCEDURE — 99999 PR PBB SHADOW E&M-EST. PATIENT-LVL II: CPT | Mod: PBBFAC,,, | Performed by: OPTOMETRIST

## 2023-06-09 PROCEDURE — 99213 OFFICE O/P EST LOW 20 MIN: CPT | Mod: S$GLB,,, | Performed by: OPTOMETRIST

## 2023-06-09 PROCEDURE — 1101F PT FALLS ASSESS-DOCD LE1/YR: CPT | Mod: CPTII,S$GLB,, | Performed by: OPTOMETRIST

## 2023-06-09 PROCEDURE — 99213 PR OFFICE/OUTPT VISIT, EST, LEVL III, 20-29 MIN: ICD-10-PCS | Mod: S$GLB,,, | Performed by: OPTOMETRIST

## 2023-06-09 PROCEDURE — 1159F PR MEDICATION LIST DOCUMENTED IN MEDICAL RECORD: ICD-10-PCS | Mod: CPTII,S$GLB,, | Performed by: OPTOMETRIST

## 2023-06-09 PROCEDURE — 1125F AMNT PAIN NOTED PAIN PRSNT: CPT | Mod: CPTII,S$GLB,, | Performed by: OPTOMETRIST

## 2023-06-09 PROCEDURE — 1159F MED LIST DOCD IN RCRD: CPT | Mod: CPTII,S$GLB,, | Performed by: OPTOMETRIST

## 2023-06-09 PROCEDURE — 99999 PR PBB SHADOW E&M-EST. PATIENT-LVL II: ICD-10-PCS | Mod: PBBFAC,,, | Performed by: OPTOMETRIST

## 2023-06-09 RX ORDER — NEOMYCIN SULFATE, POLYMYXIN B SULFATE, AND DEXAMETHASONE 3.5; 10000; 1 MG/G; [USP'U]/G; MG/G
OINTMENT OPHTHALMIC 3 TIMES DAILY
Qty: 1 EACH | Refills: 0 | Status: SHIPPED | OUTPATIENT
Start: 2023-06-09 | End: 2023-12-05 | Stop reason: CLARIF

## 2023-06-09 NOTE — PROGRESS NOTES
Subjective:       Patient ID: Javid Daniel is a 82 y.o. male      Chief Complaint   Patient presents with    Eye Problem     History of Present Illness   Dls: 3/8/21 Dr. Sanders    83 y/o male presents today with c/o x 4 days red bump RLL itching RLL mucous od fbs od no changes in vision no pain. Pt using warm compresses, systane and dry eye gtts ou.          Assessment/Plan:     1. Hordeolum externum of right lower eyelid  RLL temporal, coming to a head and expressing. Continue warm compresses BID x 5-10 minutes. Dexacine colby TID. AT PRN. If no resolution by end of month, can refer for excision if needed.   - neomycin-polymyxin-dexamethasone (DEXACINE) 3.5 mg/g-10,000 unit/g-0.1 % Oint; Place into the right eye 3 (three) times daily.  Dispense: 1 each; Refill: 0    Follow up if symptoms worsen or fail to improve.

## 2023-06-13 ENCOUNTER — OFFICE VISIT (OUTPATIENT)
Dept: HEMATOLOGY/ONCOLOGY | Facility: CLINIC | Age: 83
End: 2023-06-13
Payer: MEDICARE

## 2023-06-13 VITALS
DIASTOLIC BLOOD PRESSURE: 85 MMHG | SYSTOLIC BLOOD PRESSURE: 153 MMHG | WEIGHT: 180.56 LBS | BODY MASS INDEX: 26.74 KG/M2 | OXYGEN SATURATION: 94 % | HEIGHT: 69 IN | HEART RATE: 63 BPM

## 2023-06-13 DIAGNOSIS — N40.1 BENIGN PROSTATIC HYPERPLASIA WITH NOCTURIA: ICD-10-CM

## 2023-06-13 DIAGNOSIS — M17.0 PRIMARY OSTEOARTHRITIS OF BOTH KNEES: Chronic | ICD-10-CM

## 2023-06-13 DIAGNOSIS — I10 HYPERTENSION, BENIGN: ICD-10-CM

## 2023-06-13 DIAGNOSIS — D61.818 PANCYTOPENIA: Primary | ICD-10-CM

## 2023-06-13 DIAGNOSIS — D69.6 THROMBOCYTOPENIA: ICD-10-CM

## 2023-06-13 DIAGNOSIS — F33.1 MODERATE EPISODE OF RECURRENT MAJOR DEPRESSIVE DISORDER: ICD-10-CM

## 2023-06-13 DIAGNOSIS — D64.9 NORMOCYTIC ANEMIA: ICD-10-CM

## 2023-06-13 DIAGNOSIS — R35.1 BENIGN PROSTATIC HYPERPLASIA WITH NOCTURIA: ICD-10-CM

## 2023-06-13 DIAGNOSIS — M48.061 SPINAL STENOSIS OF LUMBAR REGION, UNSPECIFIED WHETHER NEUROGENIC CLAUDICATION PRESENT: ICD-10-CM

## 2023-06-13 DIAGNOSIS — F41.1 GENERALIZED ANXIETY DISORDER: ICD-10-CM

## 2023-06-13 PROCEDURE — 3079F DIAST BP 80-89 MM HG: CPT | Mod: CPTII,S$GLB,, | Performed by: STUDENT IN AN ORGANIZED HEALTH CARE EDUCATION/TRAINING PROGRAM

## 2023-06-13 PROCEDURE — 99214 OFFICE O/P EST MOD 30 MIN: CPT | Mod: S$GLB,,, | Performed by: STUDENT IN AN ORGANIZED HEALTH CARE EDUCATION/TRAINING PROGRAM

## 2023-06-13 PROCEDURE — 3288F FALL RISK ASSESSMENT DOCD: CPT | Mod: CPTII,S$GLB,, | Performed by: STUDENT IN AN ORGANIZED HEALTH CARE EDUCATION/TRAINING PROGRAM

## 2023-06-13 PROCEDURE — 3077F SYST BP >= 140 MM HG: CPT | Mod: CPTII,S$GLB,, | Performed by: STUDENT IN AN ORGANIZED HEALTH CARE EDUCATION/TRAINING PROGRAM

## 2023-06-13 PROCEDURE — 1101F PR PT FALLS ASSESS DOC 0-1 FALLS W/OUT INJ PAST YR: ICD-10-PCS | Mod: CPTII,S$GLB,, | Performed by: STUDENT IN AN ORGANIZED HEALTH CARE EDUCATION/TRAINING PROGRAM

## 2023-06-13 PROCEDURE — 3288F PR FALLS RISK ASSESSMENT DOCUMENTED: ICD-10-PCS | Mod: CPTII,S$GLB,, | Performed by: STUDENT IN AN ORGANIZED HEALTH CARE EDUCATION/TRAINING PROGRAM

## 2023-06-13 PROCEDURE — 3077F PR MOST RECENT SYSTOLIC BLOOD PRESSURE >= 140 MM HG: ICD-10-PCS | Mod: CPTII,S$GLB,, | Performed by: STUDENT IN AN ORGANIZED HEALTH CARE EDUCATION/TRAINING PROGRAM

## 2023-06-13 PROCEDURE — 1126F AMNT PAIN NOTED NONE PRSNT: CPT | Mod: CPTII,S$GLB,, | Performed by: STUDENT IN AN ORGANIZED HEALTH CARE EDUCATION/TRAINING PROGRAM

## 2023-06-13 PROCEDURE — 99214 PR OFFICE/OUTPT VISIT, EST, LEVL IV, 30-39 MIN: ICD-10-PCS | Mod: S$GLB,,, | Performed by: STUDENT IN AN ORGANIZED HEALTH CARE EDUCATION/TRAINING PROGRAM

## 2023-06-13 PROCEDURE — 1101F PT FALLS ASSESS-DOCD LE1/YR: CPT | Mod: CPTII,S$GLB,, | Performed by: STUDENT IN AN ORGANIZED HEALTH CARE EDUCATION/TRAINING PROGRAM

## 2023-06-13 PROCEDURE — 99999 PR PBB SHADOW E&M-EST. PATIENT-LVL II: CPT | Mod: PBBFAC,,, | Performed by: STUDENT IN AN ORGANIZED HEALTH CARE EDUCATION/TRAINING PROGRAM

## 2023-06-13 PROCEDURE — 3079F PR MOST RECENT DIASTOLIC BLOOD PRESSURE 80-89 MM HG: ICD-10-PCS | Mod: CPTII,S$GLB,, | Performed by: STUDENT IN AN ORGANIZED HEALTH CARE EDUCATION/TRAINING PROGRAM

## 2023-06-13 PROCEDURE — 1126F PR PAIN SEVERITY QUANTIFIED, NO PAIN PRESENT: ICD-10-PCS | Mod: CPTII,S$GLB,, | Performed by: STUDENT IN AN ORGANIZED HEALTH CARE EDUCATION/TRAINING PROGRAM

## 2023-06-13 PROCEDURE — 99999 PR PBB SHADOW E&M-EST. PATIENT-LVL II: ICD-10-PCS | Mod: PBBFAC,,, | Performed by: STUDENT IN AN ORGANIZED HEALTH CARE EDUCATION/TRAINING PROGRAM

## 2023-06-13 RX ORDER — DICLOFENAC SODIUM 10 MG/G
GEL TOPICAL
Qty: 100 G | Refills: 5 | Status: SHIPPED | OUTPATIENT
Start: 2023-06-13 | End: 2023-12-15

## 2023-06-13 NOTE — PROGRESS NOTES
Hematology- Oncology Clinic Note :     6/13/2023    Chief Complaint   Patient presents with    Follow-up    Anemia    Results     Pt refused  services preferring son to translate     HPI  Pt is a 82 y.o. male who  has a past medical history of Anxiety, GERD (gastroesophageal reflux disease), Hypertension, Nuclear sclerosis of both eyes (3/8/2021), and Primary osteoarthritis of both knees (4/9/2023). Who presents as a referral for pancytopenia.  Pt's only complaint at time of exam is chronic joint pain.        Interval hx:    Results reviewed with pt and CBC has improved since stopping PO NSAIDS.  Pt still using voltaren gel but stopped all PO NSAIDS.  Pt agreeable to further monitoring and denied any issues at time of exam.  All questions answered to pt's satisfaction at time of exam.    Active Problem List with Overview Notes    Diagnosis Date Noted    Insomnia 04/09/2023    Normocytic anemia 04/09/2023    Thrombocytopenia 04/09/2023    Primary osteoarthritis of both knees 04/09/2023    Lumbar spinal stenosis 04/09/2023    Lumbar spondylosis 04/09/2023    Senile purpura 11/22/2022    Moderate episode of recurrent major depressive disorder 11/22/2022    Generalized anxiety disorder 11/22/2022    Hypertension, benign 11/22/2022    Refractive error 03/08/2021    Nuclear sclerosis of both eyes 03/08/2021    Benign prostatic hyperplasia with nocturia 12/06/2019    Weakness of trunk musculature 11/27/2017    Poor flexibility of tendon 11/27/2017    Decreased range of motion of hip 11/27/2017    Weakness of both lower extremities 11/27/2017       Patient Active Problem List    Diagnosis Date Noted    Insomnia 04/09/2023    Normocytic anemia 04/09/2023    Thrombocytopenia 04/09/2023    Primary osteoarthritis of both knees 04/09/2023    Lumbar spinal stenosis 04/09/2023    Lumbar spondylosis 04/09/2023    Senile purpura 11/22/2022    Moderate episode of recurrent major depressive disorder 11/22/2022    Generalized  anxiety disorder 11/22/2022    Hypertension, benign 11/22/2022    Refractive error 03/08/2021    Nuclear sclerosis of both eyes 03/08/2021    Benign prostatic hyperplasia with nocturia 12/06/2019    Weakness of trunk musculature 11/27/2017    Poor flexibility of tendon 11/27/2017    Decreased range of motion of hip 11/27/2017    Weakness of both lower extremities 11/27/2017     Past Medical History:   Diagnosis Date    Anxiety     GERD (gastroesophageal reflux disease)     Hypertension     Nuclear sclerosis of both eyes 3/8/2021    Primary osteoarthritis of both knees 4/9/2023      No past surgical history on file.   (Not in a hospital admission)    Review of patient's allergies indicates:  No Known Allergies   Social History     Tobacco Use    Smoking status: Never    Smokeless tobacco: Never   Substance Use Topics    Alcohol use: Not Currently      Family History   Problem Relation Age of Onset    No Known Problems Mother     No Known Problems Father     No Known Problems Sister     No Known Problems Brother     No Known Problems Maternal Aunt     No Known Problems Maternal Uncle     No Known Problems Paternal Aunt     No Known Problems Paternal Uncle     No Known Problems Maternal Grandmother     No Known Problems Maternal Grandfather     No Known Problems Paternal Grandmother     No Known Problems Paternal Grandfather     No Known Problems Other     Amblyopia Neg Hx     Blindness Neg Hx     Cancer Neg Hx     Cataracts Neg Hx     Diabetes Neg Hx     Glaucoma Neg Hx     Hypertension Neg Hx     Macular degeneration Neg Hx     Retinal detachment Neg Hx     Strabismus Neg Hx     Stroke Neg Hx     Thyroid disease Neg Hx     Colon cancer Neg Hx     Esophageal cancer Neg Hx         Review of Systems :  Review of Systems   Constitutional:  Negative for fever, malaise/fatigue and weight loss.   HENT:  Negative for congestion and hearing loss.    Eyes:  Negative for blurred vision and pain.   Respiratory:  Negative for  cough, sputum production and shortness of breath.    Cardiovascular:  Negative for chest pain, palpitations and claudication.   Gastrointestinal:  Negative for abdominal pain, blood in stool, constipation, diarrhea, heartburn, nausea and vomiting.   Genitourinary:  Negative for dysuria and hematuria.   Musculoskeletal:  Positive for back pain, joint pain and myalgias. Negative for falls and neck pain.   Skin:  Negative for itching and rash.   Neurological:  Negative for dizziness, focal weakness and weakness.   Endo/Heme/Allergies:  Does not bruise/bleed easily.   Psychiatric/Behavioral:  Negative for depression. The patient is not nervous/anxious.      Physical Exam :  Wt Readings from Last 3 Encounters:   06/13/23 81.9 kg (180 lb 8.9 oz)   05/01/23 82.6 kg (182 lb)   04/25/23 82.9 kg (182 lb 12.2 oz)     Temp Readings from Last 3 Encounters:   04/13/23 99.1 °F (37.3 °C) (Oral)   04/06/23 97.8 °F (36.6 °C) (Oral)   11/22/22 97.7 °F (36.5 °C) (Oral)     BP Readings from Last 3 Encounters:   06/13/23 (!) 153/85   05/01/23 136/76   04/25/23 (!) 151/88     Pulse Readings from Last 3 Encounters:   06/13/23 63   05/01/23 62   04/25/23 64     Body mass index is 26.66 kg/m².    Physical Exam  Vitals reviewed.   Constitutional:       Appearance: Normal appearance.      Comments: Uses walker   HENT:      Head: Normocephalic and atraumatic.      Nose: Nose normal.      Mouth/Throat:      Mouth: Mucous membranes are moist.   Eyes:      Pupils: Pupils are equal, round, and reactive to light.   Cardiovascular:      Rate and Rhythm: Normal rate.      Heart sounds: Normal heart sounds.   Pulmonary:      Effort: Pulmonary effort is normal.      Breath sounds: Normal breath sounds.   Abdominal:      General: Abdomen is flat.   Musculoskeletal:      Cervical back: Neck supple.      Right lower leg: No edema.      Left lower leg: No edema.   Skin:     General: Skin is warm and dry.   Neurological:      Mental Status: He is alert.  Mental status is at baseline.   Psychiatric:         Mood and Affect: Mood normal.         Behavior: Behavior normal.         Pertinent Diagnostic studies:    No results found for this or any previous visit (from the past 24 hour(s)).    Assessment/Plan :       Anemia/Thrombocytopenia-improving   -Mild and worsening this past year but improved on past labs  -most likely multifactorial but could primarily be due to heavy NSAID use, age and co morbidities  -Full anemia workup largely unremarkable  -Continue to monitor, if plts not improving on next set of labs will get immature plt fraction and liver US  -RTC in 5 months for MD visit and labs      Hypertension, benign  -Not well controlled on current meds  -Could be influenced by NSAID use, pt stopped all PO NSAID use over past 3 months  -PCP working to adjust       Hx of depression/Anxiety  -Denies SI or HI  -On cymbalta  -Per PCP        Benign prostatic hyperplasia with nocturia  - Controlled on doxazosin  -Per PCP        Primary osteoarthritis of both knees  - Partially controled on Voltaren gel  -Per PCP       Lumbar radiculopathy/Degenerative disc disease, lumbar/Chronic midline low back pain without sciatica  -Chronic   -On gabapentin  -Pt considering alternative pain meds and agreeable to think about pain management, clinic number provided  -Per PCP          Time spent on case: 30 minutes      Summary of orders placed this encounter:  No orders of the defined types were placed in this encounter.      Future Appointments   Date Time Provider Department Center   9/15/2023  8:30 AM LAB, [facility] DRAW [facility]ATION [facility] LAB [facility]   9/20/2023  9:00 AM Maxx Mcintosh Jr., MD [facility]           Henrique Patton MD   Hematology/oncology, [facility]

## 2023-06-20 DIAGNOSIS — F41.9 ANXIETY: ICD-10-CM

## 2023-06-20 NOTE — TELEPHONE ENCOUNTER
----- Message from Keerthi George sent at 6/20/2023  2:11 PM CDT -----  Regarding: Refill Request  .Type: RX Refill Request    Who Called: daughter  Have you contacted your pharmacy:no    Refill or New Rx: refill    RX Name and Strength:ALPRAZolam (XANAX) 0.5 MG tablet    Preferred Pharmacy with phone number:.  [id number] - [place] - [address] AT [facility] [address]  [place]  [address]  [place] [address]  Phone: [phone] Fax: [phone]     Local or Mail Order:local    Ordering Provider:Arnaldo    Would the patient rather a call back or a response via [facility]? call    Best Call Back Number:.[phone]     Additional Information:

## 2023-06-20 NOTE — TELEPHONE ENCOUNTER
No care due was identified.  [facility] Embedded Care Due Messages. Reference number: [id number].   6/20/2023 2:50:07 PM CDT

## 2023-06-21 RX ORDER — ALPRAZOLAM 0.5 MG/1
0.5 TABLET ORAL NIGHTLY PRN
Qty: 90 TABLET | Refills: 1 | Status: SHIPPED | OUTPATIENT
Start: 2023-06-21 | End: 2023-11-06

## 2023-06-21 NOTE — TELEPHONE ENCOUNTER
----- Message from Carmelita Cornejo sent at 6/21/2023  2:03 PM CDT -----  Regarding: self [phone]  Type: RX Refill Request       Who Called: self         Refill or New Rx: refill       RX Name and Strength: ALPRAZolam (XANAX) 0.5 MG tablet         Preferred Pharmacy with phone number:  [facility] #[id number] - [place] - [address] AT [facility] [address]  [address]  [place] [address]  Phone: [phone] Fax: [phone]     Local or Mail Order: local            Would the patient rather a call back or a response via [facility]? Call back [phone]       Foot fx

## 2023-07-26 ENCOUNTER — PES CALL (OUTPATIENT)
Dept: ADMINISTRATIVE | Facility: CLINIC | Age: 83
End: 2023-07-26
Payer: MEDICARE

## 2023-08-09 ENCOUNTER — TELEPHONE (OUTPATIENT)
Dept: FAMILY MEDICINE | Facility: CLINIC | Age: 83
End: 2023-08-09
Payer: MEDICARE

## 2023-08-09 ENCOUNTER — TELEPHONE (OUTPATIENT)
Dept: ADMINISTRATIVE | Facility: CLINIC | Age: 83
End: 2023-08-09
Payer: MEDICARE

## 2023-08-09 DIAGNOSIS — M15.9 PRIMARY OSTEOARTHRITIS INVOLVING MULTIPLE JOINTS: Primary | ICD-10-CM

## 2023-08-09 NOTE — TELEPHONE ENCOUNTER
----- Message from Gwendolyn Mendez sent at 8/9/2023  3:36 PM CDT -----  .Type: Patient Call Back    Who called: Javid . - son     What is the request in detail: Would like to know can you send a referral to Rheumatology for Dr. Robina Jorge @ [phone] (office) [phone] (fax)     Can the clinic reply by [facility]?    Would the patient rather a call back or a response via [facility]?     Best call back number:[phone]    Additional Information:

## 2023-08-10 ENCOUNTER — OFFICE VISIT (OUTPATIENT)
Dept: FAMILY MEDICINE | Facility: CLINIC | Age: 83
End: 2023-08-10
Payer: MEDICARE

## 2023-08-10 VITALS
RESPIRATION RATE: 16 BRPM | HEART RATE: 73 BPM | TEMPERATURE: 98 F | HEIGHT: 69 IN | OXYGEN SATURATION: 93 % | WEIGHT: 190.5 LBS | SYSTOLIC BLOOD PRESSURE: 132 MMHG | BODY MASS INDEX: 28.22 KG/M2 | DIASTOLIC BLOOD PRESSURE: 62 MMHG

## 2023-08-10 DIAGNOSIS — F41.1 GENERALIZED ANXIETY DISORDER: Chronic | ICD-10-CM

## 2023-08-10 DIAGNOSIS — Z00.00 ENCOUNTER FOR MEDICARE ANNUAL WELLNESS EXAM: ICD-10-CM

## 2023-08-10 DIAGNOSIS — F33.1 MODERATE EPISODE OF RECURRENT MAJOR DEPRESSIVE DISORDER: Chronic | ICD-10-CM

## 2023-08-10 DIAGNOSIS — Z00.00 ENCOUNTER FOR PREVENTIVE HEALTH EXAMINATION: Primary | ICD-10-CM

## 2023-08-10 DIAGNOSIS — D61.818 PANCYTOPENIA: ICD-10-CM

## 2023-08-10 DIAGNOSIS — M13.0 ARTHRITIS OF MULTIPLE SITES: ICD-10-CM

## 2023-08-10 PROCEDURE — 3075F SYST BP GE 130 - 139MM HG: CPT | Mod: HCNC,CPTII,S$GLB, | Performed by: NURSE PRACTITIONER

## 2023-08-10 PROCEDURE — 1159F MED LIST DOCD IN RCRD: CPT | Mod: HCNC,CPTII,S$GLB, | Performed by: NURSE PRACTITIONER

## 2023-08-10 PROCEDURE — 1125F AMNT PAIN NOTED PAIN PRSNT: CPT | Mod: HCNC,CPTII,S$GLB, | Performed by: NURSE PRACTITIONER

## 2023-08-10 PROCEDURE — G9919 SCRN ND POS ND PROV OF REC: HCPCS | Mod: HCNC,CPTII,S$GLB, | Performed by: NURSE PRACTITIONER

## 2023-08-10 PROCEDURE — 1101F PT FALLS ASSESS-DOCD LE1/YR: CPT | Mod: HCNC,CPTII,S$GLB, | Performed by: NURSE PRACTITIONER

## 2023-08-10 PROCEDURE — 3075F PR MOST RECENT SYSTOLIC BLOOD PRESS GE 130-139MM HG: ICD-10-PCS | Mod: HCNC,CPTII,S$GLB, | Performed by: NURSE PRACTITIONER

## 2023-08-10 PROCEDURE — 3078F DIAST BP <80 MM HG: CPT | Mod: HCNC,CPTII,S$GLB, | Performed by: NURSE PRACTITIONER

## 2023-08-10 PROCEDURE — 99999 PR PBB SHADOW E&M-EST. PATIENT-LVL V: CPT | Mod: PBBFAC,HCNC,, | Performed by: NURSE PRACTITIONER

## 2023-08-10 PROCEDURE — 1159F PR MEDICATION LIST DOCUMENTED IN MEDICAL RECORD: ICD-10-PCS | Mod: HCNC,CPTII,S$GLB, | Performed by: NURSE PRACTITIONER

## 2023-08-10 PROCEDURE — 1170F PR FUNCTIONAL STATUS ASSESSED: ICD-10-PCS | Mod: HCNC,CPTII,S$GLB, | Performed by: NURSE PRACTITIONER

## 2023-08-10 PROCEDURE — 3288F FALL RISK ASSESSMENT DOCD: CPT | Mod: HCNC,CPTII,S$GLB, | Performed by: NURSE PRACTITIONER

## 2023-08-10 PROCEDURE — G0439 PR MEDICARE ANNUAL WELLNESS SUBSEQUENT VISIT: ICD-10-PCS | Mod: HCNC,S$GLB,, | Performed by: NURSE PRACTITIONER

## 2023-08-10 PROCEDURE — 3288F PR FALLS RISK ASSESSMENT DOCUMENTED: ICD-10-PCS | Mod: HCNC,CPTII,S$GLB, | Performed by: NURSE PRACTITIONER

## 2023-08-10 PROCEDURE — 1125F PR PAIN SEVERITY QUANTIFIED, PAIN PRESENT: ICD-10-PCS | Mod: HCNC,CPTII,S$GLB, | Performed by: NURSE PRACTITIONER

## 2023-08-10 PROCEDURE — G9919 PR SCREENING AND POSITIVE: ICD-10-PCS | Mod: HCNC,CPTII,S$GLB, | Performed by: NURSE PRACTITIONER

## 2023-08-10 PROCEDURE — 1170F FXNL STATUS ASSESSED: CPT | Mod: HCNC,CPTII,S$GLB, | Performed by: NURSE PRACTITIONER

## 2023-08-10 PROCEDURE — G0439 PPPS, SUBSEQ VISIT: HCPCS | Mod: HCNC,S$GLB,, | Performed by: NURSE PRACTITIONER

## 2023-08-10 PROCEDURE — 3078F PR MOST RECENT DIASTOLIC BLOOD PRESSURE < 80 MM HG: ICD-10-PCS | Mod: HCNC,CPTII,S$GLB, | Performed by: NURSE PRACTITIONER

## 2023-08-10 PROCEDURE — 99999 PR PBB SHADOW E&M-EST. PATIENT-LVL V: ICD-10-PCS | Mod: PBBFAC,HCNC,, | Performed by: NURSE PRACTITIONER

## 2023-08-10 PROCEDURE — 1101F PR PT FALLS ASSESS DOC 0-1 FALLS W/OUT INJ PAST YR: ICD-10-PCS | Mod: HCNC,CPTII,S$GLB, | Performed by: NURSE PRACTITIONER

## 2023-08-10 NOTE — PATIENT INSTRUCTIONS
Counseling and Referral of Other Preventative  (Italic type indicates deductible and co-insurance are waived)    Patient Name: Javid Daniel  Today's Date: 8/10/2023    Health Maintenance       Date Due Completion Date    COVID-19 Vaccine (7 - Pfizer risk series) 06/20/2023 4/25/2023    Influenza Vaccine (1) 09/01/2023 9/13/2022    Lipid Panel 03/31/2028 3/31/2023    TETANUS VACCINE 03/03/2030 3/3/2020        No orders of the defined types were placed in this encounter.      The following information is provided to all patients.  This information is to help you find resources for any of the problems found today that may be affecting your health:                Living healthy guide: www.Pending sale to [facility].[place].gov      Understanding Diabetes: www.diabetes.org      Eating healthy: www.cdc.gov/healthyweight      Formerly named [facility] home safety checklist: www.cdc.gov/steadi/patient.html      Agency on Aging: www.goea.[place].[facility]      Alcoholics anonymous (AA): www.aa.org      Physical Activity: www.delvin.nih.gov/gw9wvol      Tobacco use: www.quit[place]us[place].org

## 2023-08-10 NOTE — PROGRESS NOTES
"  Javid Daniel presented for a  Medicare AWV and comprehensive Health Risk Assessment today. The following components were reviewed and updated:    Medical history  Family History  Social history  Allergies and Current Medications  Health Risk Assessment  Health Maintenance  Care Team         ** See Completed Assessments for Annual Wellness Visit within the encounter summary.**         The following assessments were completed:  Living Situation  CAGE  Depression Screening  Timed Get Up and Go  Whisper Test  Cognitive Function Screening  Nutrition Screening  ADL Screening  PAQ Screening        Vitals:    08/10/23 1427   BP: 132/62   BP Location: Left arm   Patient Position: Sitting   BP Method: Large (Manual)   Pulse: 73   Resp: 16   Temp: 98.4 °F (36.9 °C)   TempSrc: Oral   SpO2: (!) 93%   Weight: 86.4 kg (190 lb 7.6 oz)   Height: 5' 9" (1.753 m)     Body mass index is 28.13 kg/m².  Physical Exam  Vitals reviewed.   Constitutional:       General: He is not in acute distress.     Appearance: Normal appearance. He is not ill-appearing, toxic-appearing or diaphoretic.   HENT:      Head: Normocephalic and atraumatic.   Cardiovascular:      Pulses: Normal pulses.   Pulmonary:      Effort: Pulmonary effort is normal. No respiratory distress.      Breath sounds: No wheezing.   Skin:     General: Skin is warm and dry.   Neurological:      General: No focal deficit present.      Mental Status: He is alert and oriented to person, place, and time.   Psychiatric:         Mood and Affect: Mood normal.         Behavior: Behavior normal.                 Diagnoses and health risks identified today and associated recommendations/orders:    1. Encounter for Medicare annual wellness exam  The patient was seen today for an annual Medicare wellness exam.  Health maintenance and screening topics were discussed.  Proper diet and exercise recommendations were reviewed.  - Ambulatory Referral/Consult to Enhanced Annual Wellness Visit " (eAWV)    2. Encounter for preventive health examination  As above.    3. Moderate episode of recurrent major depressive disorder  No acute concerns.    4. Generalized anxiety disorder  No acute concerns.    5. Pancytopenia  No acute concerns.    6. Arthritis of multiple sites    Patient requesting referral to Rheumatology for arthritis of multiple sites.  - Ambulatory referral/consult to Rheumatology; Future    Review current opioid prescriptions: Done. F/b pain clinic.  Screen for potential Substance Use Disorders: No overt evidence of abuse.    Synup software used to interpret this visit. Name Mehdi ID [id number].    Provided Javid with a 5-10 year written screening schedule and personal prevention plan. Recommendations were developed using the USPSTF age appropriate recommendations. Education, counseling, and referrals were provided as needed. After Visit Summary printed and given to patient which includes a list of additional screenings\tests needed.    Follow up in about 1 year (around 8/10/2024) for AWV.    Danny Kong, NP  I offered to discuss advanced care planning, including how to pick a person who would make decisions for you if you were unable to make them for yourself, called a health care power of , and what kind of decisions you might make such as use of life sustaining treatments such as ventilators and tube feeding when faced with a life limiting illness recorded on a living will that they will need to know. (How you want to be cared for as you near the end of your natural life)     X Patient is interested in learning more about how to make advanced directives.  I provided them paperwork and offered to discuss this with them.

## 2023-08-19 NOTE — TELEPHONE ENCOUNTER
"Please call patient with , and let patient know that the rheumatologist he requested refused the referral the following message:    "For referrals for "Osteoarthritis", "anthritis"r "anthralgia", "joint pain", "fibnomyalgia", etc:   Due to our having limited Rheumatology clinic and already having an extended wait time we cannot accept referrals for osteoarthritis, degenenative joint disease,   fibromyatgia, or non-specific joint pain. These ane left to primary cane mgmt. If there are signs/symptoms concenning for an inflammatory arthritis such as AM   stiffness of >60 mins, objectively swollen joints, symmetnic joint pain of the small joints of hands/feet, or inflammatory findings on x-ray (such as erosions)   referral can be accepted, but please indicate this in the note or referral. Also, if RF and/or CCP are significantly elevated, please indicate.   If a patient has suspected inflammatony arthritis a reasonable workup would be RF, CCP, ESR, CRP, and hand X-rays. Thank you.   OA deferred to primary care. If request penipherat joint injection can re-refer, but our wait time is over 4 mos so likely faster to see Ontho. We do not treat degenerative back pain.   Thanks "  "

## 2023-08-21 ENCOUNTER — TELEPHONE (OUTPATIENT)
Dept: FAMILY MEDICINE | Facility: CLINIC | Age: 83
End: 2023-08-21
Payer: MEDICARE

## 2023-08-21 NOTE — TELEPHONE ENCOUNTER
----- Message from Leonie Berry sent at 8/21/2023  1:30 PM CDT -----  Regarding: patient call back  Type: Patient Call Back    Who called: Nate son     What is the request in detail: Asked for a call back from the office to get referral     Can the clinic reply by [facility]? No     Would the patient rather a call back or a response via [facility]? Call     Best call back number: .[phone]

## 2023-08-21 NOTE — TELEPHONE ENCOUNTER
----- Message from Jasson Puneet sent at 8/21/2023  1:53 PM CDT -----  Regarding: Return Call    Who Called:  Patient Son      Who Left Message for Patient:  Dr. Mcintosh      Does the patient know what this is regarding?  Call Back        Best Call Back Number: [phone] Patient son         Additional Information: Patient is returning a call.  Please assist.

## 2023-08-22 ENCOUNTER — TELEPHONE (OUTPATIENT)
Dept: FAMILY MEDICINE | Facility: CLINIC | Age: 83
End: 2023-08-22
Payer: MEDICARE

## 2023-08-22 NOTE — TELEPHONE ENCOUNTER
Rt pt so Javid call , he wanted a [facility] Rheumatology provider for father due to distance and preferred Mexican speaking . Informed [facility] has no [facility] Rheumatologist at this time .We aren't able to determine providers based on wether the speak Mexican but translation  services are provided at locations . Then decided to just f/u with Dr. Vernon again . Aware of [facility] location and address . Accepted first opening 11/20 at 1:30 pm . Also gave dept number to contact if rescheduling and questions regarding location .

## 2023-08-22 NOTE — TELEPHONE ENCOUNTER
----- Message from Papito Narayanan sent at 8/22/2023 10:06 AM CDT -----  Regarding: Markus hua [phone]   Type: Patient Returning Call    Who Called: Mrakus Hua     Who Left Message for Patient: Kala Nieves MA       Does the patient know what this is regarding?: yes     Would the patient rather a call back or a response via [facility]?  Call back     Best Call Back Number: .[phone]      Additional Information:     Thank you.

## 2023-09-15 ENCOUNTER — LAB VISIT (OUTPATIENT)
Dept: LAB | Facility: HOSPITAL | Age: 83
End: 2023-09-15
Attending: FAMILY MEDICINE
Payer: MEDICARE

## 2023-09-15 DIAGNOSIS — I10 HYPERTENSION, BENIGN: Chronic | ICD-10-CM

## 2023-09-15 LAB
ALBUMIN SERPL BCP-MCNC: 3.8 G/DL (ref 3.5–5.2)
ALP SERPL-CCNC: 56 U/L (ref 55–135)
ALT SERPL W/O P-5'-P-CCNC: 10 U/L (ref 10–44)
ANION GAP SERPL CALC-SCNC: 8 MMOL/L (ref 8–16)
AST SERPL-CCNC: 16 U/L (ref 10–40)
BILIRUB SERPL-MCNC: 0.9 MG/DL (ref 0.1–1)
BUN SERPL-MCNC: 16 MG/DL (ref 8–23)
CALCIUM SERPL-MCNC: 9.1 MG/DL (ref 8.7–10.5)
CHLORIDE SERPL-SCNC: 102 MMOL/L (ref 95–110)
CHOLEST SERPL-MCNC: 151 MG/DL (ref 120–199)
CHOLEST/HDLC SERPL: 2.8 {RATIO} (ref 2–5)
CO2 SERPL-SCNC: 28 MMOL/L (ref 23–29)
CREAT SERPL-MCNC: 0.8 MG/DL (ref 0.5–1.4)
EST. GFR  (NO RACE VARIABLE): >60 ML/MIN/1.73 M^2
GLUCOSE SERPL-MCNC: 90 MG/DL (ref 70–110)
HDLC SERPL-MCNC: 53 MG/DL (ref 40–75)
HDLC SERPL: 35.1 % (ref 20–50)
LDLC SERPL CALC-MCNC: 77.6 MG/DL (ref 63–159)
NONHDLC SERPL-MCNC: 98 MG/DL
POTASSIUM SERPL-SCNC: 4.3 MMOL/L (ref 3.5–5.1)
PROT SERPL-MCNC: 6.5 G/DL (ref 6–8.4)
SODIUM SERPL-SCNC: 138 MMOL/L (ref 136–145)
TRIGL SERPL-MCNC: 102 MG/DL (ref 30–150)

## 2023-09-15 PROCEDURE — 36415 COLL VENOUS BLD VENIPUNCTURE: CPT | Mod: HCNC,PN | Performed by: FAMILY MEDICINE

## 2023-09-15 PROCEDURE — 80061 LIPID PANEL: CPT | Mod: HCNC | Performed by: FAMILY MEDICINE

## 2023-09-15 PROCEDURE — 80053 COMPREHEN METABOLIC PANEL: CPT | Mod: HCNC | Performed by: FAMILY MEDICINE

## 2023-09-20 ENCOUNTER — OFFICE VISIT (OUTPATIENT)
Dept: FAMILY MEDICINE | Facility: CLINIC | Age: 83
End: 2023-09-20
Payer: MEDICARE

## 2023-09-20 VITALS
WEIGHT: 199.94 LBS | HEIGHT: 69 IN | OXYGEN SATURATION: 96 % | BODY MASS INDEX: 29.61 KG/M2 | DIASTOLIC BLOOD PRESSURE: 60 MMHG | SYSTOLIC BLOOD PRESSURE: 128 MMHG | HEART RATE: 81 BPM | TEMPERATURE: 99 F

## 2023-09-20 DIAGNOSIS — R35.1 BENIGN PROSTATIC HYPERPLASIA WITH NOCTURIA: Chronic | ICD-10-CM

## 2023-09-20 DIAGNOSIS — K21.9 GASTROESOPHAGEAL REFLUX DISEASE WITHOUT ESOPHAGITIS: ICD-10-CM

## 2023-09-20 DIAGNOSIS — J31.0 CHRONIC RHINITIS: ICD-10-CM

## 2023-09-20 DIAGNOSIS — N40.1 BENIGN PROSTATIC HYPERPLASIA WITH NOCTURIA: Chronic | ICD-10-CM

## 2023-09-20 DIAGNOSIS — I10 HYPERTENSION, BENIGN: Primary | Chronic | ICD-10-CM

## 2023-09-20 DIAGNOSIS — M48.061 SPINAL STENOSIS OF LUMBAR REGION, UNSPECIFIED WHETHER NEUROGENIC CLAUDICATION PRESENT: Chronic | ICD-10-CM

## 2023-09-20 DIAGNOSIS — F41.9 ANXIETY: ICD-10-CM

## 2023-09-20 DIAGNOSIS — F33.9 EPISODE OF RECURRENT MAJOR DEPRESSIVE DISORDER, UNSPECIFIED DEPRESSION EPISODE SEVERITY: Chronic | ICD-10-CM

## 2023-09-20 PROCEDURE — 3288F PR FALLS RISK ASSESSMENT DOCUMENTED: ICD-10-PCS | Mod: HCNC,CPTII,S$GLB, | Performed by: FAMILY MEDICINE

## 2023-09-20 PROCEDURE — 1159F PR MEDICATION LIST DOCUMENTED IN MEDICAL RECORD: ICD-10-PCS | Mod: HCNC,CPTII,S$GLB, | Performed by: FAMILY MEDICINE

## 2023-09-20 PROCEDURE — 3074F PR MOST RECENT SYSTOLIC BLOOD PRESSURE < 130 MM HG: ICD-10-PCS | Mod: HCNC,CPTII,S$GLB, | Performed by: FAMILY MEDICINE

## 2023-09-20 PROCEDURE — 1160F RVW MEDS BY RX/DR IN RCRD: CPT | Mod: HCNC,CPTII,S$GLB, | Performed by: FAMILY MEDICINE

## 2023-09-20 PROCEDURE — 99214 PR OFFICE/OUTPT VISIT, EST, LEVL IV, 30-39 MIN: ICD-10-PCS | Mod: HCNC,S$GLB,, | Performed by: FAMILY MEDICINE

## 2023-09-20 PROCEDURE — 1159F MED LIST DOCD IN RCRD: CPT | Mod: HCNC,CPTII,S$GLB, | Performed by: FAMILY MEDICINE

## 2023-09-20 PROCEDURE — 1125F PR PAIN SEVERITY QUANTIFIED, PAIN PRESENT: ICD-10-PCS | Mod: HCNC,CPTII,S$GLB, | Performed by: FAMILY MEDICINE

## 2023-09-20 PROCEDURE — 1125F AMNT PAIN NOTED PAIN PRSNT: CPT | Mod: HCNC,CPTII,S$GLB, | Performed by: FAMILY MEDICINE

## 2023-09-20 PROCEDURE — 1101F PR PT FALLS ASSESS DOC 0-1 FALLS W/OUT INJ PAST YR: ICD-10-PCS | Mod: HCNC,CPTII,S$GLB, | Performed by: FAMILY MEDICINE

## 2023-09-20 PROCEDURE — 1101F PT FALLS ASSESS-DOCD LE1/YR: CPT | Mod: HCNC,CPTII,S$GLB, | Performed by: FAMILY MEDICINE

## 2023-09-20 PROCEDURE — 99214 OFFICE O/P EST MOD 30 MIN: CPT | Mod: HCNC,S$GLB,, | Performed by: FAMILY MEDICINE

## 2023-09-20 PROCEDURE — 99999 PR PBB SHADOW E&M-EST. PATIENT-LVL IV: CPT | Mod: PBBFAC,HCNC,, | Performed by: FAMILY MEDICINE

## 2023-09-20 PROCEDURE — 3078F PR MOST RECENT DIASTOLIC BLOOD PRESSURE < 80 MM HG: ICD-10-PCS | Mod: HCNC,CPTII,S$GLB, | Performed by: FAMILY MEDICINE

## 2023-09-20 PROCEDURE — 3078F DIAST BP <80 MM HG: CPT | Mod: HCNC,CPTII,S$GLB, | Performed by: FAMILY MEDICINE

## 2023-09-20 PROCEDURE — 3288F FALL RISK ASSESSMENT DOCD: CPT | Mod: HCNC,CPTII,S$GLB, | Performed by: FAMILY MEDICINE

## 2023-09-20 PROCEDURE — 1160F PR REVIEW ALL MEDS BY PRESCRIBER/CLIN PHARMACIST DOCUMENTED: ICD-10-PCS | Mod: HCNC,CPTII,S$GLB, | Performed by: FAMILY MEDICINE

## 2023-09-20 PROCEDURE — 99999 PR PBB SHADOW E&M-EST. PATIENT-LVL IV: ICD-10-PCS | Mod: PBBFAC,HCNC,, | Performed by: FAMILY MEDICINE

## 2023-09-20 PROCEDURE — 3074F SYST BP LT 130 MM HG: CPT | Mod: HCNC,CPTII,S$GLB, | Performed by: FAMILY MEDICINE

## 2023-09-20 RX ORDER — CARVEDILOL 12.5 MG/1
12.5 TABLET ORAL 2 TIMES DAILY WITH MEALS
Qty: 180 TABLET | Refills: 3 | Status: SHIPPED | OUTPATIENT
Start: 2023-09-20 | End: 2024-01-18 | Stop reason: SDUPTHER

## 2023-09-20 RX ORDER — BUSPIRONE HYDROCHLORIDE 15 MG/1
15 TABLET ORAL 3 TIMES DAILY
Qty: 270 TABLET | Refills: 3 | Status: SHIPPED | OUTPATIENT
Start: 2023-09-20 | End: 2023-11-06 | Stop reason: SDUPTHER

## 2023-09-20 RX ORDER — OLMESARTAN MEDOXOMIL 40 MG/1
40 TABLET ORAL DAILY
Qty: 90 TABLET | Refills: 3 | Status: SHIPPED | OUTPATIENT
Start: 2023-09-20

## 2023-09-20 RX ORDER — FLUTICASONE PROPIONATE 50 MCG
SPRAY, SUSPENSION (ML) NASAL
Qty: 16 G | Refills: 5 | Status: SHIPPED | OUTPATIENT
Start: 2023-09-20 | End: 2024-03-08 | Stop reason: SDUPTHER

## 2023-09-20 RX ORDER — PANTOPRAZOLE SODIUM 40 MG/1
40 TABLET, DELAYED RELEASE ORAL DAILY
Qty: 90 TABLET | Refills: 3 | Status: SHIPPED | OUTPATIENT
Start: 2023-09-20 | End: 2024-09-19

## 2023-09-20 RX ORDER — LEVOCETIRIZINE DIHYDROCHLORIDE 5 MG/1
5 TABLET, FILM COATED ORAL NIGHTLY
Qty: 90 TABLET | Refills: 3 | Status: SHIPPED | OUTPATIENT
Start: 2023-09-20

## 2023-09-20 RX ORDER — DOXAZOSIN 4 MG/1
4 TABLET ORAL NIGHTLY
Qty: 90 TABLET | Refills: 3 | Status: SHIPPED | OUTPATIENT
Start: 2023-09-20

## 2023-09-20 RX ORDER — DULOXETIN HYDROCHLORIDE 20 MG/1
20 CAPSULE, DELAYED RELEASE ORAL DAILY
Qty: 90 CAPSULE | Refills: 3 | Status: SHIPPED | OUTPATIENT
Start: 2023-09-20 | End: 2023-11-06 | Stop reason: SDUPTHER

## 2023-09-20 RX ORDER — AMLODIPINE BESYLATE 10 MG/1
10 TABLET ORAL DAILY
Qty: 90 TABLET | Refills: 3 | Status: SHIPPED | OUTPATIENT
Start: 2023-09-20

## 2023-09-20 NOTE — PATIENT INSTRUCTIONS
Debe programar citas de seguimiento con kimbrough médico especialista en dolor para un control continuo del dolor de espalda. También es necesario programar singh hina con psiquiatría para el manejo de la ansiedad y la depresión. Enviaré singh última receta de alprazolam. Llame al [phone] ([facility]) para programar kimbrough hina de juliette conductual/juliette mental.      English  You need to make follow up appointments with your pain management doctor for continued management of the back pain. You also need to schedule an appointment with psychiatry for the management of the anxiety and the depression. I will send one last prescription for alprazolam. Please call  [phone]( [employer]) to schedule your behavioral health/mental health appointment

## 2023-09-24 PROBLEM — F41.9 ANXIETY: Status: ACTIVE | Noted: 2023-09-24

## 2023-09-24 NOTE — ASSESSMENT & PLAN NOTE
Discussed with patient importance of make an appointment with Psychiatry.  Will continue Cymbalta for now.

## 2023-09-24 NOTE — PROGRESS NOTES
Subjective:       Patient ID: Javid Daniel is a 83 y.o. male.    Chief Complaint: Hypertension    Hypertension  This is a chronic problem. The current episode started more than 1 year ago. The problem is uncontrolled (patient insists that at home his BP measurements are fine, however does not have readings available). Pertinent negatives include no anxiety, blurred vision, chest pain, headaches, malaise/fatigue, orthopnea, palpitations, peripheral edema, PND, shortness of breath or sweats. Risk factors for coronary artery disease include male gender and sedentary lifestyle. Past treatments include beta blockers, central alpha agonists and calcium channel blockers. The current treatment provides moderate improvement. Compliance problems: states he takes his medications correctly, however refills on file are not consistent with this.    Back Pain  This is a chronic problem. The current episode started more than 1 year ago. Episode frequency: controlled with current regimen. The problem is unchanged. The pain is present in the costovertebral angle and lumbar spine. The quality of the pain is described as shooting. The pain radiates to the left foot and right foot. The pain is severe. The symptoms are aggravated by standing and bending. Stiffness is present All day. Associated symptoms include leg pain and tingling. Pertinent negatives include no abdominal pain, chest pain, fever, headaches, numbness, paresis, paresthesias or weight loss. Treatments tried: [facility]. Had MRI recently.   Anxiety  Presents for follow-up visit. Symptoms include decreased concentration, depressed mood, excessive worry, muscle tension and nervous/anxious behavior. Patient reports no chest pain, compulsions, confusion, dizziness, dry mouth, feeling of choking, hyperventilation, irritability, obsessions, palpitations, shortness of breath or suicidal ideas. Symptoms occur most days. The severity of symptoms is  moderate. The quality of sleep is fair.     His past medical history is significant for anemia. Compliance with medications is %.   Depression  Visit Type: follow-up  Patient presents with the following symptoms: decreased concentration, depressed mood, excessive worry, muscle tension and nervousness/anxiety.  Patient is not experiencing: compulsions, confusion, dry mouth, hyperventilation, irritability, obsessions, palpitations, shortness of breath, suicidal ideas, suicidal planning, thoughts of death and weight loss.  Frequency of symptoms: most days   Severity: moderate   Compliance with medications:  % (patient requests increasing Xanax prescription multiple times.)      Review of Systems   Constitutional:  Negative for fever, irritability, malaise/fatigue and weight loss.   Eyes:  Negative for blurred vision.   Respiratory:  Negative for shortness of breath.    Cardiovascular:  Negative for chest pain, palpitations, orthopnea and PND.   Gastrointestinal:  Negative for abdominal pain.   Musculoskeletal:  Positive for back pain and leg pain.   Integumentary:  Negative for pallor.   Neurological:  Positive for tingling. Negative for dizziness, light-headedness, numbness, headaches and paresthesias.   Hematological:  Does not bruise/bleed easily.   Psychiatric/Behavioral:  Positive for decreased concentration. Negative for confusion and suicidal ideas. The patient is nervous/anxious.          Objective:      Physical Exam  Vitals reviewed.   Constitutional:       General: He is not in acute distress.     Appearance: He is well-developed. He is not diaphoretic.   HENT:      Head: Normocephalic and atraumatic.      Right Ear: External ear normal.      Left Ear: External ear normal.      Nose: Nose normal.      Mouth/Throat:      Mouth: Mucous membranes are moist.   Eyes:      General:         Right eye: No discharge.         Left eye: No discharge.      Conjunctiva/sclera: Conjunctivae normal.       Pupils: Pupils are equal, round, and reactive to light.   Neck:      Thyroid: No thyromegaly.      Trachea: No tracheal deviation.   Cardiovascular:      Rate and Rhythm: Normal rate and regular rhythm.      Pulses:           Radial pulses are 2+ on the right side and 2+ on the left side.      Heart sounds: Normal heart sounds, S1 normal and S2 normal.   Pulmonary:      Effort: Pulmonary effort is normal. No respiratory distress.      Breath sounds: Normal breath sounds. No wheezing, rhonchi or rales.   Abdominal:      General: Bowel sounds are normal. There is no distension.      Palpations: Abdomen is soft. Abdomen is not rigid. There is no mass.      Tenderness: There is no abdominal tenderness. There is no guarding.   Musculoskeletal:      Cervical back: Normal range of motion and neck supple.   Lymphadenopathy:      Cervical: No cervical adenopathy.   Skin:     General: Skin is warm and dry.      Capillary Refill: Capillary refill takes less than 2 seconds.      Findings: Rash present. Rash is purpuric (various on upper extremities).   Neurological:      Mental Status: He is alert and oriented to person, place, and time.      Cranial Nerves: No cranial nerve deficit.      Sensory: No sensory deficit.      Motor: No atrophy or abnormal muscle tone.      Deep Tendon Reflexes:      Reflex Scores:       Patellar reflexes are 2+ on the right side and 2+ on the left side.  Psychiatric:         Mood and Affect: Mood is anxious.         Speech: Speech is tangential.         Behavior: Behavior is agitated. Behavior is not hyperactive. Aggressive: however very confrontational.        Thought Content: Thought content does not include homicidal or suicidal ideation.         Lab Visit on 09/15/2023   Component Date Value Ref Range Status    Sodium 09/15/2023 138  136 - 145 mmol/L Final    Potassium 09/15/2023 4.3  3.5 - 5.1 mmol/L Final    Chloride 09/15/2023 102  95 - 110 mmol/L Final    CO2 09/15/2023 28  23 - 29 mmol/L  Final    Glucose 09/15/2023 90  70 - 110 mg/dL Final    BUN 09/15/2023 16  8 - 23 mg/dL Final    Creatinine 09/15/2023 0.8  0.5 - 1.4 mg/dL Final    Calcium 09/15/2023 9.1  8.7 - 10.5 mg/dL Final    Total Protein 09/15/2023 6.5  6.0 - 8.4 g/dL Final    Albumin 09/15/2023 3.8  3.5 - 5.2 g/dL Final    Total Bilirubin 09/15/2023 0.9  0.1 - 1.0 mg/dL Final    Comment: For infants and newborns, interpretation of results should be based  on gestational age, weight and in agreement with clinical  observations.    Premature Infant recommended reference ranges:  Up to 24 hours.............<8.0 mg/dL  Up to 48 hours............<12.0 mg/dL  3-5 days..................<15.0 mg/dL  6-29 days.................<15.0 mg/dL      Alkaline Phosphatase 09/15/2023 56  55 - 135 U/L Final    AST 09/15/2023 16  10 - 40 U/L Final    ALT 09/15/2023 10  10 - 44 U/L Final    eGFR 09/15/2023 >60  >60 mL/min/1.73 m^2 Final    Anion Gap 09/15/2023 8  8 - 16 mmol/L Final    Cholesterol 09/15/2023 151  120 - 199 mg/dL Final    Comment: The National Cholesterol Education Program (NCEP) has set the  following guidelines (reference ranges) for Cholesterol:  Optimal.....................<200 mg/dL  Borderline High.............200-239 mg/dL  High........................> or = 240 mg/dL      Triglycerides 09/15/2023 102  30 - 150 mg/dL Final    Comment: The National Cholesterol Education Program (NCEP) has set the  following guidelines (reference values) for triglycerides:  Normal......................<150 mg/dL  Borderline High.............150-199 mg/dL  High........................200-499 mg/dL      HDL 09/15/2023 53  40 - 75 mg/dL Final    Comment: The National Cholesterol Education Program (NCEP) has set the  following guidelines (reference values) for HDL Cholesterol:  Low...............<40 mg/dL  Optimal...........>60 mg/dL      LDL Cholesterol 09/15/2023 77.6  63.0 - 159.0 mg/dL Final    Comment: The National Cholesterol Education Program (NCEP) has  set the  following guidelines (reference values) for LDL Cholesterol:  Optimal.......................<130 mg/dL  Borderline High...............130-159 mg/dL  High..........................160-189 mg/dL  Very High.....................>190 mg/dL      HDL/Cholesterol Ratio 09/15/2023 35.1  20.0 - 50.0 % Final    Total Cholesterol/HDL Ratio 09/15/2023 2.8  2.0 - 5.0 Final    Non-HDL Cholesterol 09/15/2023 98  mg/dL Final    Comment: Risk category and Non-HDL cholesterol goals:  Coronary heart disease (CHD)or equivalent (10-year risk of CHD >20%):  Non-HDL cholesterol goal     <130 mg/dL  Two or more CHD risk factors and 10-year risk of CHD <= 20%:  Non-HDL cholesterol goal     <160 mg/dL  0 to 1 CHD risk factor:  Non-HDL cholesterol goal     <190 mg/dL         Assessment/Plan         1. Hypertension, benign  Overview:  BP Readings from Last 3 Encounters:   09/20/23 128/60   08/10/23 132/62   06/13/23 (!) 153/85      ACC/AHA guidelines on blood pressure goals reviewed.  Reinforced correct way of measuring blood pressure.     Assessment & Plan:  Continue current regimen.    Orders:  -     carvediloL (COREG) 12.5 MG tablet; Take 1 tablet (12.5 mg total) by mouth 2 (two) times daily with meals.  Dispense: 180 tablet; Refill: 3  -     doxazosin (CARDURA) 4 MG tablet; Take 1 tablet (4 mg total) by mouth every evening.  Dispense: 90 tablet; Refill: 3  -     olmesartan (BENICAR) 40 MG tablet; Take 1 tablet (40 mg total) by mouth once daily.  Dispense: 90 tablet; Refill: 3  -     amLODIPine (NORVASC) 10 MG tablet; Take 1 tablet (10 mg total) by mouth once daily.  Dispense: 90 tablet; Refill: 3    2. Benign prostatic hyperplasia with nocturia  Assessment & Plan:  Reports doing well.  Continue doxazosin.    Orders:  -     doxazosin (CARDURA) 4 MG tablet; Take 1 tablet (4 mg total) by mouth every evening.  Dispense: 90 tablet; Refill: 3    3. Episode of recurrent major depressive disorder, unspecified depression episode  severity  Assessment & Plan:  Discussed with patient importance of make an appointment with Psychiatry.  Will continue Cymbalta for now.    Orders:  -     DULoxetine (CYMBALTA) 20 MG capsule; Take 1 capsule (20 mg total) by mouth once daily.  Dispense: 90 capsule; Refill: 3  -     Ambulatory referral/consult to Psychiatry; Future; Expected date: 09/27/2023    4. Anxiety  Assessment & Plan:  See depression.    Orders:  -     DULoxetine (CYMBALTA) 20 MG capsule; Take 1 capsule (20 mg total) by mouth once daily.  Dispense: 90 capsule; Refill: 3  -     busPIRone (BUSPAR) 15 MG tablet; Take 1 tablet (15 mg total) by mouth 3 (three) times daily.  Dispense: 270 tablet; Refill: 3  -     Ambulatory referral/consult to Psychiatry; Future; Expected date: 09/27/2023    5. Chronic rhinitis  -     levocetirizine (XYZAL) 5 MG tablet; Take 1 tablet (5 mg total) by mouth every evening.  Dispense: 90 tablet; Refill: 3  -     fluticasone propionate (FLONASE) 50 mcg/actuation nasal spray; SHAKE LIQUID AND USE 2 SPRAYS(100 MCG) IN EACH NOSTRIL EVERY DAY  Dispense: 16 g; Refill: 5    6. Gastroesophageal reflux disease without esophagitis  -     pantoprazole (PROTONIX) 40 MG tablet; Take 1 tablet (40 mg total) by mouth once daily.  Dispense: 90 tablet; Refill: 3    7. Spinal stenosis of lumbar region, unspecified whether neurogenic claudication present  Assessment & Plan:  Patient past due for follow-up with pain management.  Appointment was scheduled.

## 2023-10-27 ENCOUNTER — PATIENT MESSAGE (OUTPATIENT)
Dept: RHEUMATOLOGY | Facility: CLINIC | Age: 83
End: 2023-10-27
Payer: MEDICARE

## 2023-10-30 ENCOUNTER — TELEPHONE (OUTPATIENT)
Dept: PSYCHIATRY | Facility: CLINIC | Age: 83
End: 2023-10-30
Payer: MEDICARE

## 2023-10-30 NOTE — TELEPHONE ENCOUNTER
Pt's son called to see if an  would be available for pt's appt on 11/6. Confirmed that a request was approved and that there would be an over the phone  at the appt. He asked if it would be possible to have an in person . Told him I would submit a request and let him know. No concerns voiced.

## 2023-11-06 ENCOUNTER — OFFICE VISIT (OUTPATIENT)
Dept: PSYCHIATRY | Facility: CLINIC | Age: 83
End: 2023-11-06
Payer: MEDICARE

## 2023-11-06 VITALS
DIASTOLIC BLOOD PRESSURE: 79 MMHG | SYSTOLIC BLOOD PRESSURE: 177 MMHG | BODY MASS INDEX: 28.9 KG/M2 | OXYGEN SATURATION: 98 % | HEIGHT: 69 IN | WEIGHT: 195.13 LBS | HEART RATE: 61 BPM

## 2023-11-06 DIAGNOSIS — G47.00 INSOMNIA, UNSPECIFIED TYPE: Primary | ICD-10-CM

## 2023-11-06 DIAGNOSIS — F41.1 GAD (GENERALIZED ANXIETY DISORDER): ICD-10-CM

## 2023-11-06 DIAGNOSIS — F33.0 MDD (MAJOR DEPRESSIVE DISORDER), RECURRENT EPISODE, MILD: ICD-10-CM

## 2023-11-06 PROCEDURE — 99205 OFFICE O/P NEW HI 60 MIN: CPT | Mod: HCNC,S$GLB,,

## 2023-11-06 PROCEDURE — 3078F PR MOST RECENT DIASTOLIC BLOOD PRESSURE < 80 MM HG: ICD-10-PCS | Mod: HCNC,CPTII,S$GLB,

## 2023-11-06 PROCEDURE — 90785 PR INTERACTIVE COMPLEXITY: ICD-10-PCS | Mod: HCNC,S$GLB,,

## 2023-11-06 PROCEDURE — 3077F PR MOST RECENT SYSTOLIC BLOOD PRESSURE >= 140 MM HG: ICD-10-PCS | Mod: HCNC,CPTII,S$GLB,

## 2023-11-06 PROCEDURE — 3078F DIAST BP <80 MM HG: CPT | Mod: HCNC,CPTII,S$GLB,

## 2023-11-06 PROCEDURE — 3077F SYST BP >= 140 MM HG: CPT | Mod: HCNC,CPTII,S$GLB,

## 2023-11-06 PROCEDURE — 1159F PR MEDICATION LIST DOCUMENTED IN MEDICAL RECORD: ICD-10-PCS | Mod: HCNC,CPTII,S$GLB,

## 2023-11-06 PROCEDURE — 99205 PR OFFICE/OUTPT VISIT, NEW, LEVL V, 60-74 MIN: ICD-10-PCS | Mod: HCNC,S$GLB,,

## 2023-11-06 PROCEDURE — 1160F RVW MEDS BY RX/DR IN RCRD: CPT | Mod: HCNC,CPTII,S$GLB,

## 2023-11-06 PROCEDURE — 99999 PR PBB SHADOW E&M-EST. PATIENT-LVL III: ICD-10-PCS | Mod: PBBFAC,HCNC,,

## 2023-11-06 PROCEDURE — 90785 PSYTX COMPLEX INTERACTIVE: CPT | Mod: HCNC,S$GLB,,

## 2023-11-06 PROCEDURE — 1159F MED LIST DOCD IN RCRD: CPT | Mod: HCNC,CPTII,S$GLB,

## 2023-11-06 PROCEDURE — 99999 PR PBB SHADOW E&M-EST. PATIENT-LVL III: CPT | Mod: PBBFAC,HCNC,,

## 2023-11-06 PROCEDURE — 90833 PR PSYCHOTHERAPY W/PATIENT W/E&M, 30 MIN (ADD ON): ICD-10-PCS | Mod: HCNC,S$GLB,,

## 2023-11-06 PROCEDURE — 1160F PR REVIEW ALL MEDS BY PRESCRIBER/CLIN PHARMACIST DOCUMENTED: ICD-10-PCS | Mod: HCNC,CPTII,S$GLB,

## 2023-11-06 PROCEDURE — 90833 PSYTX W PT W E/M 30 MIN: CPT | Mod: HCNC,S$GLB,,

## 2023-11-06 RX ORDER — DULOXETIN HYDROCHLORIDE 20 MG/1
20 CAPSULE, DELAYED RELEASE ORAL DAILY
Qty: 90 CAPSULE | Refills: 0 | Status: SHIPPED | OUTPATIENT
Start: 2023-11-06 | End: 2023-12-11 | Stop reason: SDUPTHER

## 2023-11-06 RX ORDER — BUSPIRONE HYDROCHLORIDE 15 MG/1
15 TABLET ORAL 3 TIMES DAILY
Qty: 270 TABLET | Refills: 0 | Status: SHIPPED | OUTPATIENT
Start: 2023-11-06 | End: 2023-12-11 | Stop reason: SDUPTHER

## 2023-11-06 RX ORDER — MIRTAZAPINE 7.5 MG/1
7.5 TABLET, FILM COATED ORAL NIGHTLY
Qty: 30 TABLET | Refills: 1 | Status: SHIPPED | OUTPATIENT
Start: 2023-11-06 | End: 2023-12-11 | Stop reason: SDUPTHER

## 2023-11-06 NOTE — PROGRESS NOTES
"Outpatient Psychiatry Initial Visit   11/6/2023    Javid Daniel, a 83 y.o. male, presenting for initial evaluation visit. Met with patient and son.    Reason for Encounter: Referral from Dr. Mcintosh . Patient complains of anxiety and depression     This encounter was done using a telephonic . The patient was given the opportunity to ask questions and everything was answered to their satisfaction. Pt voiced understanding of diagnosis and plan.     History of Present Illness:    SUBJECTIVE:   Psych Interview 11/06/2023:   Javid Daniel is a 83 y.o. male with past psychiatric history of MDD and ISHMAEL  presented to for initial evaluation and treatment for anxiety and depression.    Pt is A+Ox 4.  Patients mood is "I need to sleep", affect appears appropriate. Pts thought process is normal and logical.  Pts speech is normal tone, normal rate, normal pitch, normal volume   Cooperative, normal eye contact, no psychomotor retardation.  Pt is calmly seated in chair during interview.  Pt is casually dressed and well groomed.  Pt attended Interview With son (Javid) per Pt request.  Son participates extensively in interview. Patient has difficulty hearing and is not wearing a hearing device. Son states that patient has an appointment within the next three months.    Patient was never previously being seen by Psychiatry, was being treated by PCP.  Pt states that they are currently taking Xanax 0.5mg PRN daily, Buspar 15mg TID, Cymbalta 20mg daily.  Patient states that he has been on Xanax 0.5 MG nightly for insomnia for the last five years. Patient states that he has not taken any other medications for insomnia and is interested in alternatives. Patient is taking opioids occasionally, every other day.  Patient denies anxiety and depression at this time. States that he has stable on Cymbalta 20 MG daily and Buspar 15MG TID, Patient states that he wishes to continue these two medications.       Patient " endorses snoring, daytime drowsiness, and daytime napping.  patient has never been evaluated by a sleep specialist. patient has a strong family history of sleep apnea. Patient wakes up approximately three to four times a night to urinate. Patient states that he drinks five to seven glasses of water a day.  Patient states that he is not interested in seeing a sleep professional at this time.    Patient originally grew up in [facility] [place] [facility], immigrated to [place] in [place]. States that he moved to [place] to be closer with family. Son states that patient had a drinking problem in [place] [facility] [place], [place] has not had a drink.    Denies prior hx of psychiatric hospitalizations. Denies hx of suicide attempts. Pt denies hx self harm. Pt denies hx hallucinations.  Pt denies hx of eating disorders.  Pt denies hx trauma. Denies physical/sexual abuse. Pt denies symptoms including nightmares, hypervigilance, flashbacks, avoidance behaviors, and disassociation.    Reports depression today as 0/10, and anxiety as 0/10.  Reports sleeping 2-3 hrs per night, and good appetite.   Denies SI/HI/AVH. Denies side effects of medications.  Pt states that there support consists of wife and son  Denies recreational drug use. Pt reports 0 drinks per week, denies tobacco use, denies Vaping, 1cup of coffee - Caffeine.      Current Medication:  Xanax 0.5mg PRN daily  Buspar 15mg TID  Cymbalta 20mg daily     Past Medications:      DX:  The patient complained of depressed mood with lethargy, decreased appetite , insomnia, psycho-motor retardation, anhedonia, apathy, worsening self-esteem, guilt, decreased concentration and ability to make decisions.     Pt denies hx symptoms/episodes of ruby.    Admits to symptoms of anxiety including excessive anxiety/worry/fear, more days than not, about numerous issues, difficulty controlling the worry, over thinking, rumination, restlessness, poor concentration, fatigue, and increased  irritability. Denies panic attacks at this time.       Review Of Systems:     Review of Systems   Constitutional:  Negative for weight loss.   HENT:  Negative for tinnitus.    Eyes:  Negative for blurred vision.   Respiratory:  Negative for cough and shortness of breath.    Cardiovascular:  Negative for chest pain.   Gastrointestinal:  Negative for abdominal pain.   Genitourinary:  Negative for dysuria.   Musculoskeletal:  Negative for back pain and neck pain.   Skin:  Negative for rash.   Neurological:  Negative for dizziness, seizures and weakness.   Psychiatric/Behavioral:  Negative for depression, hallucinations, memory loss, substance abuse and suicidal ideas. The patient has insomnia. The patient is not nervous/anxious.        Psychiatric Review Of Systems - Is patient experiencing or having changes in:  sleep: yes  appetite: no  weight: no  energy/anergy: no  interest/pleasure/anhedonia: no  somatic symptoms: no  libido: no  anxiety/panic: no  guilty/hopelessness: no  concentration: no  S.I.B.s/risky behavior: no  Irritability: no  Racing thoughts: no  Impulsive behaviors: no  Paranoia: no  AVH: no    Risk Parameters:  Patient reports no suicidal ideation  Patient reports no homicidal ideation  Patient reports no self-injurious behavior  Patient reports no violent behavior    OBJECTIVE     Past Psychiatric History:   Previous Psychiatric Hospitalizations: NO  Previous Medication Trials: NO  History of psychotherapy:  NO  Previous Suicide Attempts: NO  History of Violence:  YES: when drinking, stopped drinking 40 years ago  History of physical/sexual abuse: NO  Outpatient psychiatric provider(past): NO    Substance Abuse History:   Tobacco: NO  Alcohol: NO  Illicit Substances: NO  Detox/Rehab: NO    Neurological History:   Seizures: NO  Head trauma: NO    Family Psychiatric History: Yes -   Father - anxiety     Social History:  Developmental/Childhood:Achieved all developmental milestones  timely  *Education:High School Diploma  Employment Status/Finances:Retired   Relationship Status/Sexual Orientation: : Relationship intact  Children: 3  Housing Status: Home    history:  NO  Access to gun: NO  Mormonism: Jewish  Recreational activities:Time with family  Person patient is closest to/confides in: wife and son    Legal History:   Past Charges/Incarcerations:  No      Past Medical/Surgical History:   Past Medical History:   Diagnosis Date    Anxiety     GERD (gastroesophageal reflux disease)     Hypertension     Nuclear sclerosis of both eyes 03/08/2021    Primary osteoarthritis of both knees 04/09/2023     No past surgical history on file.      Current Medications:   Medication List with Changes/Refills   New Medications    MIRTAZAPINE (REMERON) 7.5 MG TAB    Take 1 tablet (7.5 mg total) by mouth every evening.   Current Medications    AMLODIPINE (NORVASC) 10 MG TABLET    Take 1 tablet (10 mg total) by mouth once daily.    CARVEDILOL (COREG) 12.5 MG TABLET    Take 1 tablet (12.5 mg total) by mouth 2 (two) times daily with meals.    DICLOFENAC (VOLTAREN) 50 MG EC TABLET    Take 50 mg by mouth 2 (two) times daily with meals.    DICLOFENAC SODIUM (VOLTAREN) 1 % GEL    APPLY 3 TO 4 GRAMS ONTO THE AFFECTED AREA OF THE SKIN BID    DOXAZOSIN (CARDURA) 4 MG TABLET    Take 1 tablet (4 mg total) by mouth every evening.    FLUTICASONE PROPIONATE (FLONASE) 50 MCG/ACTUATION NASAL SPRAY    SHAKE LIQUID AND USE 2 SPRAYS(100 MCG) IN EACH NOSTRIL EVERY DAY    GABAPENTIN (NEURONTIN) 600 MG TABLET    Take 1 tablet (600 mg total) by mouth 3 (three) times daily. Take 2 or 3 times a day.    HYDROCODONE-ACETAMINOPHEN (NORCO) 5-325 MG PER TABLET    Take 1 tablet by mouth daily as needed.    LEVOCETIRIZINE (XYZAL) 5 MG TABLET    Take 1 tablet (5 mg total) by mouth every evening.    NEOMYCIN-POLYMYXIN-DEXAMETHASONE (DEXACINE) 3.5 MG/G-10,000 UNIT/G-0.1 % OINT    Place into the right eye 3 (three) times daily.     "OLMESARTAN (BENICAR) 40 MG TABLET    Take 1 tablet (40 mg total) by mouth once daily.    PANTOPRAZOLE (PROTONIX) 40 MG TABLET    Take 1 tablet (40 mg total) by mouth once daily.   Changed and/or Refilled Medications    Modified Medication Previous Medication    BUSPIRONE (BUSPAR) 15 MG TABLET busPIRone (BUSPAR) 15 MG tablet       Take 1 tablet (15 mg total) by mouth 3 (three) times daily.    Take 1 tablet (15 mg total) by mouth 3 (three) times daily.    DULOXETINE (CYMBALTA) 20 MG CAPSULE DULoxetine (CYMBALTA) 20 MG capsule       Take 1 capsule (20 mg total) by mouth once daily.    Take 1 capsule (20 mg total) by mouth once daily.   Discontinued Medications    ALPRAZOLAM (XANAX) 0.5 MG TABLET    Take 1 tablet (0.5 mg total) by mouth nightly as needed for Insomnia.       Allergies:   Review of patient's allergies indicates:  No Known Allergies      Vitals   Vitals:    11/06/23 1257   BP: (!) 177/79   Pulse: 61        Labs/Imaging/Studies:   No results found for this or any previous visit (from the past 48 hour(s)).   No results found for: "PHENYTOIN", "PHENOBARB", "VALPROATE", "CBMZ"      Nutritional Screening: Considering the patient's height and weight, medications, medical history and preferences, should a referral be made to the dietitian? no    Constitutional  Vitals:  Most recent vital signs, dated less than 90 days prior to this appointment, were reviewed.    Vitals:    11/06/23 1257   BP: (!) 177/79   Pulse: 61   SpO2: 98%   Weight: 88.5 kg (195 lb 1.7 oz)   Height: 5' 9" (1.753 m)        General:  unremarkable, age appropriate     Musculoskeletal  Muscle Strength/Tone:  no spasicity, no rigidity, no cogwheeling, no flaccidity, no paratonia, no dyskinesia, no dystonia, no tremor, no tic, no choreoathetosis, no atrophy   Gait & Station:  uses [place]       Psychiatric Mental Status Exam:  Arousal: alert  Sensorium/Orientation: oriented to grossly intact, person, place, situation, " time/date  Behavior/Cooperation: cooperative, eye contact normal   Speech: normal tone, normal rate, normal pitch, normal volume  Language: grossly intact, able to name, able to repeat  Mood: steady  Affect: congruent and appropriate  Thought Process: normal and logical  Thought Content: concerned with sleep  Auditory hallucinations: NO  Visual hallucinations: NO  Paranoia: NO  Delusions:  NO  Suicidal ideation: NO  Homicidal ideation: NO  Attention/Concentration:  Unable to perform  Memory:    Recent: [facility] Recent Memory: WNL , 2 out of 3 in 3 minutes  Remote: [facility] Remote Memory: WNL , past events, as relates history  Fund of Knowledge: Aware of current events, Intact, and Vocabulary appropriate    Intelligence: [facility] Intelligence: Average, based on history, based on vocabulary, syntax, grammar and content  Insight: {[facility] insight: Fair, understanding severity of illness/history of present illness  Judgment: [facility] Judgement: Fair, per patient's behavior/history of present illness      Relevant Elements of Neurological Exam: uses a walker        Laboratory Data  No visits with results within 1 Month(s) from this visit.   Latest known visit with results is:   Lab Visit on 09/15/2023   Component Date Value Ref Range Status    Sodium 09/15/2023 138  136 - 145 mmol/L Final    Potassium 09/15/2023 4.3  3.5 - 5.1 mmol/L Final    Chloride 09/15/2023 102  95 - 110 mmol/L Final    CO2 09/15/2023 28  23 - 29 mmol/L Final    Glucose 09/15/2023 90  70 - 110 mg/dL Final    BUN 09/15/2023 16  8 - 23 mg/dL Final    Creatinine 09/15/2023 0.8  0.5 - 1.4 mg/dL Final    Calcium 09/15/2023 9.1  8.7 - 10.5 mg/dL Final    Total Protein 09/15/2023 6.5  6.0 - 8.4 g/dL Final    Albumin 09/15/2023 3.8  3.5 - 5.2 g/dL Final    Total Bilirubin 09/15/2023 0.9  0.1 - 1.0 mg/dL Final    Alkaline Phosphatase 09/15/2023 56  55 - 135 U/L Final    AST 09/15/2023 16  10 - 40 U/L Final    ALT 09/15/2023 10  10 - 44 U/L Final    eGFR 09/15/2023 >60  >60  mL/min/1.73 m^2 Final    Anion Gap 09/15/2023 8  8 - 16 mmol/L Final    Cholesterol 09/15/2023 151  120 - 199 mg/dL Final    Triglycerides 09/15/2023 102  30 - 150 mg/dL Final    HDL 09/15/2023 53  40 - 75 mg/dL Final    LDL Cholesterol 09/15/2023 77.6  63.0 - 159.0 mg/dL Final    HDL/Cholesterol Ratio 09/15/2023 35.1  20.0 - 50.0 % Final    Total Cholesterol/HDL Ratio 09/15/2023 2.8  2.0 - 5.0 Final    Non-HDL Cholesterol 09/15/2023 98  mg/dL Final         Medications  Outpatient Encounter Medications as of 11/6/2023   Medication Sig Dispense Refill    amLODIPine (NORVASC) 10 MG tablet Take 1 tablet (10 mg total) by mouth once daily. 90 tablet 3    carvediloL (COREG) 12.5 MG tablet Take 1 tablet (12.5 mg total) by mouth 2 (two) times daily with meals. 180 tablet 3    doxazosin (CARDURA) 4 MG tablet Take 1 tablet (4 mg total) by mouth every evening. 90 tablet 3    fluticasone propionate (FLONASE) 50 mcg/actuation nasal spray SHAKE LIQUID AND USE 2 SPRAYS(100 MCG) IN EACH NOSTRIL EVERY DAY 16 g 5    gabapentin (NEURONTIN) 600 MG tablet Take 1 tablet (600 mg total) by mouth 3 (three) times daily. Take 2 or 3 times a day. 270 tablet 3    HYDROcodone-acetaminophen (NORCO) 5-325 mg per tablet Take 1 tablet by mouth daily as needed.      levocetirizine (XYZAL) 5 MG tablet Take 1 tablet (5 mg total) by mouth every evening. 90 tablet 3    olmesartan (BENICAR) 40 MG tablet Take 1 tablet (40 mg total) by mouth once daily. 90 tablet 3    pantoprazole (PROTONIX) 40 MG tablet Take 1 tablet (40 mg total) by mouth once daily. 90 tablet 3    [DISCONTINUED] ALPRAZolam (XANAX) 0.5 MG tablet Take 1 tablet (0.5 mg total) by mouth nightly as needed for Insomnia. 90 tablet 1    [DISCONTINUED] busPIRone (BUSPAR) 15 MG tablet Take 1 tablet (15 mg total) by mouth 3 (three) times daily. 270 tablet 3    [DISCONTINUED] DULoxetine (CYMBALTA) 20 MG capsule Take 1 capsule (20 mg total) by mouth once daily. 90 capsule 3    busPIRone (BUSPAR) 15 MG  tablet Take 1 tablet (15 mg total) by mouth 3 (three) times daily. 270 tablet 0    diclofenac (VOLTAREN) 50 MG EC tablet Take 50 mg by mouth 2 (two) times daily with meals.      diclofenac sodium (VOLTAREN) 1 % Gel APPLY 3 TO 4 GRAMS ONTO THE AFFECTED AREA OF THE SKIN BID (Patient not taking: Reported on 11/6/2023) 100 g 5    DULoxetine (CYMBALTA) 20 MG capsule Take 1 capsule (20 mg total) by mouth once daily. 90 capsule 0    mirtazapine (REMERON) 7.5 MG Tab Take 1 tablet (7.5 mg total) by mouth every evening. 30 tablet 1    neomycin-polymyxin-dexamethasone (DEXACINE) 3.5 mg/g-10,000 unit/g-0.1 % Oint Place into the right eye 3 (three) times daily. (Patient not taking: Reported on 11/6/2023) 1 each 0     No facility-administered encounter medications on file as of 11/6/2023.           Assessment / Plan:     Diagnosis:      ICD-10-CM ICD-9-CM   1. Insomnia, unspecified type  G47.00 780.52   2. MDD (major depressive disorder), recurrent episode, mild  F33.0 296.31   3. ISHMAEL (generalized anxiety disorder)  F41.1 300.02       Strengths and Liabilities: Strength: Patient accepts guidance/feedback, Strength: Patient has reasonable judgment.    Treatment Goals:  Specify outcomes written in observable, behavioral terms:   Anxiety: acquiring relapse prevention skills, reducing negative automatic thoughts, reducing physical symptoms of anxiety, and reducing time spent worrying (<30 minutes/day)  Depression: increasing interest in usual activities, increasing self-reward for positive behaviors (one/day), increasing self-reward for positive thoughts (one/day), reducing excessive guilt, and reducing fatigue    Treatment Plan/Recommendations:     Mood   Stop Xanax    - Pt is using Xanax for sleep. Will trial remeron at this time.    Continue Buspar 15mg TID - targeting anxiety   Continue Cymbalta 20mg daily - targeting anxiety and depression   Start Remeron 7.5mg QHS - targeting insomnia     Pt is being prescribed Opiods and  Benzos at the same time.  Pt has been warned of dangers of combining these medications.  I will not be filling either of these medications at this time.      Concern that Pts sleep issues are due to DRU.  Pt states that he does not want to see a sleep specialist at this time. Pt has a strong FH of DRU.      Pt instructed to Follow up with Dr. Mcintosh concerning high BP   In clinic Pt denies HA, SOB, Leg swelling, leg pain, CP, changes in vision     Encouraged Pts son to attend next appt.      Discussed diagnosis, risks and benefits of proposed treatment above vs alternative treatments vs no treatment, and potential side effects of these treatments, and the inherent unpredictability of individual response to treatment.  The patient expresses understanding and gives informed consent to pursue treatment.  The potential benefits outweigh the potential risks. Patient has no other questions. Risks/adverse effects discussed at this time including but not limited to: GI side effects, sexual dysfunction, activation vs sedation, triggering of suicidal thoughts, and serotonin syndrome.    Serotonin syndrome   Mental status changes can include anxiety, restlessness, disorientation, and agitated delirium.    Autonomic manifestations can include diaphoresis, tachycardia, hyperthermia, hypertension, vomiting, and diarrhea   Neuromuscular hyperactivity can manifest as tremor, myoclonus, hyperreflexia, rigidity, hyperthermia, seizure, and bilateral Babinski sign.   Pt was informed that if they experience any of these symptoms to go the ED.       Difficulty Sleeping Behavioral Modification:  Implement stimulus control: [place] bedroom for sleep only. Leave bedroom when frustrated from not sleeping. Engage in relaxation before returning. Engage in activities during the day. AVOID >7-8 h time in bed  Avoid clock watching  Avoid thinking/worrying about sleep when trying to fall asleep  Limit caffeinated consumption  Make sure the bedroom  is dark, quiet and cool    Safety Plan   Patient voices understanding and agreement with this plan  Provided crisis numbers  Encouraged patient to keep future appointments.  Instructed patient to call or message with questions or concerns  In the event of an emergency, including suicidal ideation, patient was advised to go to the emergency room and/or call 911    Return to Clinic: 1 month    Psychotherapy:  Target symptoms: depression, anxiety   Why chosen therapy is appropriate versus another modality: relevant to diagnosis, evidence based practice  Outcome monitoring methods: self-report, observation  Therapeutic intervention type: insight oriented psychotherapy, interactive psychotherapy  Topics discussed/themes: relationships difficulties, difficulty managing affect in interpersonal relationships, symptom recognition  The patient's response to the intervention is guarded. The patient's progress toward treatment goals is fair.   Duration of intervention: 18 minutes.    Total face to face time: 70 min  Total time (chart review, patient contact, documentation): 88 min  Pts son participated extensively in interview.      A diagnostic psychiatric evaluation was performed and responsiveness to treatment was assessed.  The patient demonstrates adequate ability/capacity to respond to treatment.    Ken Franks PA-C      *This note has been prepared using a combination of a dictation device and typing.  It has been checked for errors but some errors may still exist within the note as a result of speech recognition errors and/or typographical errors.

## 2023-11-13 ENCOUNTER — LAB VISIT (OUTPATIENT)
Dept: LAB | Facility: HOSPITAL | Age: 83
End: 2023-11-13
Attending: STUDENT IN AN ORGANIZED HEALTH CARE EDUCATION/TRAINING PROGRAM
Payer: MEDICARE

## 2023-11-13 DIAGNOSIS — D61.818 PANCYTOPENIA: ICD-10-CM

## 2023-11-13 DIAGNOSIS — D64.9 NORMOCYTIC ANEMIA: ICD-10-CM

## 2023-11-13 LAB
BASOPHILS # BLD AUTO: 0.01 K/UL (ref 0–0.2)
BASOPHILS NFR BLD: 0.4 % (ref 0–1.9)
DIFFERENTIAL METHOD: ABNORMAL
EOSINOPHIL # BLD AUTO: 0.1 K/UL (ref 0–0.5)
EOSINOPHIL NFR BLD: 3.9 % (ref 0–8)
ERYTHROCYTE [DISTWIDTH] IN BLOOD BY AUTOMATED COUNT: 13 % (ref 11.5–14.5)
FERRITIN SERPL-MCNC: 121 NG/ML (ref 20–300)
HCT VFR BLD AUTO: 36.7 % (ref 40–54)
HGB BLD-MCNC: 11.5 G/DL (ref 14–18)
IMM GRANULOCYTES # BLD AUTO: 0.01 K/UL (ref 0–0.04)
IMM GRANULOCYTES NFR BLD AUTO: 0.4 % (ref 0–0.5)
IRON SERPL-MCNC: 70 UG/DL (ref 45–160)
LYMPHOCYTES # BLD AUTO: 1 K/UL (ref 1–4.8)
LYMPHOCYTES NFR BLD: 36.3 % (ref 18–48)
MCH RBC QN AUTO: 30.4 PG (ref 27–31)
MCHC RBC AUTO-ENTMCNC: 31.3 G/DL (ref 32–36)
MCV RBC AUTO: 97 FL (ref 82–98)
MONOCYTES # BLD AUTO: 0.4 K/UL (ref 0.3–1)
MONOCYTES NFR BLD: 13.4 % (ref 4–15)
NEUTROPHILS # BLD AUTO: 1.3 K/UL (ref 1.8–7.7)
NEUTROPHILS NFR BLD: 45.6 % (ref 38–73)
NRBC BLD-RTO: 0 /100 WBC
PLATELET # BLD AUTO: 136 K/UL (ref 150–450)
PMV BLD AUTO: 12.8 FL (ref 9.2–12.9)
RBC # BLD AUTO: 3.78 M/UL (ref 4.6–6.2)
SATURATED IRON: 25 % (ref 20–50)
TOTAL IRON BINDING CAPACITY: 283 UG/DL (ref 250–450)
TRANSFERRIN SERPL-MCNC: 191 MG/DL (ref 200–375)
WBC # BLD AUTO: 2.84 K/UL (ref 3.9–12.7)

## 2023-11-13 PROCEDURE — 85025 COMPLETE CBC W/AUTO DIFF WBC: CPT | Mod: HCNC | Performed by: STUDENT IN AN ORGANIZED HEALTH CARE EDUCATION/TRAINING PROGRAM

## 2023-11-13 PROCEDURE — 84466 ASSAY OF TRANSFERRIN: CPT | Mod: HCNC | Performed by: STUDENT IN AN ORGANIZED HEALTH CARE EDUCATION/TRAINING PROGRAM

## 2023-11-13 PROCEDURE — 85055 RETICULATED PLATELET ASSAY: CPT | Performed by: STUDENT IN AN ORGANIZED HEALTH CARE EDUCATION/TRAINING PROGRAM

## 2023-11-13 PROCEDURE — 82728 ASSAY OF FERRITIN: CPT | Mod: HCNC | Performed by: STUDENT IN AN ORGANIZED HEALTH CARE EDUCATION/TRAINING PROGRAM

## 2023-11-13 PROCEDURE — 83540 ASSAY OF IRON: CPT | Mod: HCNC | Performed by: STUDENT IN AN ORGANIZED HEALTH CARE EDUCATION/TRAINING PROGRAM

## 2023-11-14 LAB — ARUP MISCELLANEOUS TEST 1: ABNORMAL

## 2023-11-15 ENCOUNTER — TELEPHONE (OUTPATIENT)
Dept: FAMILY MEDICINE | Facility: CLINIC | Age: 83
End: 2023-11-15
Payer: MEDICARE

## 2023-11-15 NOTE — TELEPHONE ENCOUNTER
----- Message from Sammi Mai sent at 11/15/2023  3:56 PM CST -----  Regarding: sooner appt  Name of caller: Jimnea (daughter)       What is the requesting detail: requesting a follow up appt after lab work. Please advise       Can the clinic reply by [facility]:       What number to call back:[phone]

## 2023-11-24 ENCOUNTER — OFFICE VISIT (OUTPATIENT)
Dept: HEMATOLOGY/ONCOLOGY | Facility: CLINIC | Age: 83
End: 2023-11-24
Payer: MEDICARE

## 2023-11-24 VITALS
OXYGEN SATURATION: 99 % | BODY MASS INDEX: 30.8 KG/M2 | WEIGHT: 203.25 LBS | SYSTOLIC BLOOD PRESSURE: 129 MMHG | DIASTOLIC BLOOD PRESSURE: 79 MMHG | HEIGHT: 68 IN | HEART RATE: 70 BPM

## 2023-11-24 DIAGNOSIS — M48.061 SPINAL STENOSIS OF LUMBAR REGION, UNSPECIFIED WHETHER NEUROGENIC CLAUDICATION PRESENT: ICD-10-CM

## 2023-11-24 DIAGNOSIS — F41.1 GENERALIZED ANXIETY DISORDER: ICD-10-CM

## 2023-11-24 DIAGNOSIS — M17.0 PRIMARY OSTEOARTHRITIS OF BOTH KNEES: ICD-10-CM

## 2023-11-24 DIAGNOSIS — N40.1 BENIGN PROSTATIC HYPERPLASIA WITH NOCTURIA: ICD-10-CM

## 2023-11-24 DIAGNOSIS — D61.818 PANCYTOPENIA: Primary | ICD-10-CM

## 2023-11-24 DIAGNOSIS — F33.1 MODERATE EPISODE OF RECURRENT MAJOR DEPRESSIVE DISORDER: ICD-10-CM

## 2023-11-24 DIAGNOSIS — I10 HYPERTENSION, BENIGN: ICD-10-CM

## 2023-11-24 DIAGNOSIS — R35.1 BENIGN PROSTATIC HYPERPLASIA WITH NOCTURIA: ICD-10-CM

## 2023-11-24 DIAGNOSIS — F19.90 EXCESSIVE USE OF NONSTEROIDAL ANTI-INFLAMMATORY DRUGS (NSAIDS): ICD-10-CM

## 2023-11-24 DIAGNOSIS — D64.9 NORMOCYTIC ANEMIA: ICD-10-CM

## 2023-11-24 PROCEDURE — 1159F PR MEDICATION LIST DOCUMENTED IN MEDICAL RECORD: ICD-10-PCS | Mod: HCNC,CPTII,S$GLB, | Performed by: STUDENT IN AN ORGANIZED HEALTH CARE EDUCATION/TRAINING PROGRAM

## 2023-11-24 PROCEDURE — 1126F AMNT PAIN NOTED NONE PRSNT: CPT | Mod: HCNC,CPTII,S$GLB, | Performed by: STUDENT IN AN ORGANIZED HEALTH CARE EDUCATION/TRAINING PROGRAM

## 2023-11-24 PROCEDURE — 1101F PT FALLS ASSESS-DOCD LE1/YR: CPT | Mod: HCNC,CPTII,S$GLB, | Performed by: STUDENT IN AN ORGANIZED HEALTH CARE EDUCATION/TRAINING PROGRAM

## 2023-11-24 PROCEDURE — 3078F PR MOST RECENT DIASTOLIC BLOOD PRESSURE < 80 MM HG: ICD-10-PCS | Mod: HCNC,CPTII,S$GLB, | Performed by: STUDENT IN AN ORGANIZED HEALTH CARE EDUCATION/TRAINING PROGRAM

## 2023-11-24 PROCEDURE — 99999 PR PBB SHADOW E&M-EST. PATIENT-LVL IV: CPT | Mod: PBBFAC,HCNC,, | Performed by: STUDENT IN AN ORGANIZED HEALTH CARE EDUCATION/TRAINING PROGRAM

## 2023-11-24 PROCEDURE — 1126F PR PAIN SEVERITY QUANTIFIED, NO PAIN PRESENT: ICD-10-PCS | Mod: HCNC,CPTII,S$GLB, | Performed by: STUDENT IN AN ORGANIZED HEALTH CARE EDUCATION/TRAINING PROGRAM

## 2023-11-24 PROCEDURE — 3078F DIAST BP <80 MM HG: CPT | Mod: HCNC,CPTII,S$GLB, | Performed by: STUDENT IN AN ORGANIZED HEALTH CARE EDUCATION/TRAINING PROGRAM

## 2023-11-24 PROCEDURE — 3288F PR FALLS RISK ASSESSMENT DOCUMENTED: ICD-10-PCS | Mod: HCNC,CPTII,S$GLB, | Performed by: STUDENT IN AN ORGANIZED HEALTH CARE EDUCATION/TRAINING PROGRAM

## 2023-11-24 PROCEDURE — 99214 PR OFFICE/OUTPT VISIT, EST, LEVL IV, 30-39 MIN: ICD-10-PCS | Mod: HCNC,S$GLB,, | Performed by: STUDENT IN AN ORGANIZED HEALTH CARE EDUCATION/TRAINING PROGRAM

## 2023-11-24 PROCEDURE — 3074F SYST BP LT 130 MM HG: CPT | Mod: HCNC,CPTII,S$GLB, | Performed by: STUDENT IN AN ORGANIZED HEALTH CARE EDUCATION/TRAINING PROGRAM

## 2023-11-24 PROCEDURE — 99999 PR PBB SHADOW E&M-EST. PATIENT-LVL IV: ICD-10-PCS | Mod: PBBFAC,HCNC,, | Performed by: STUDENT IN AN ORGANIZED HEALTH CARE EDUCATION/TRAINING PROGRAM

## 2023-11-24 PROCEDURE — 3288F FALL RISK ASSESSMENT DOCD: CPT | Mod: HCNC,CPTII,S$GLB, | Performed by: STUDENT IN AN ORGANIZED HEALTH CARE EDUCATION/TRAINING PROGRAM

## 2023-11-24 PROCEDURE — 3074F PR MOST RECENT SYSTOLIC BLOOD PRESSURE < 130 MM HG: ICD-10-PCS | Mod: HCNC,CPTII,S$GLB, | Performed by: STUDENT IN AN ORGANIZED HEALTH CARE EDUCATION/TRAINING PROGRAM

## 2023-11-24 PROCEDURE — 1101F PR PT FALLS ASSESS DOC 0-1 FALLS W/OUT INJ PAST YR: ICD-10-PCS | Mod: HCNC,CPTII,S$GLB, | Performed by: STUDENT IN AN ORGANIZED HEALTH CARE EDUCATION/TRAINING PROGRAM

## 2023-11-24 PROCEDURE — 99214 OFFICE O/P EST MOD 30 MIN: CPT | Mod: HCNC,S$GLB,, | Performed by: STUDENT IN AN ORGANIZED HEALTH CARE EDUCATION/TRAINING PROGRAM

## 2023-11-24 PROCEDURE — 1159F MED LIST DOCD IN RCRD: CPT | Mod: HCNC,CPTII,S$GLB, | Performed by: STUDENT IN AN ORGANIZED HEALTH CARE EDUCATION/TRAINING PROGRAM

## 2023-11-24 NOTE — PROGRESS NOTES
Hematology- Oncology Clinic Note :     11/24/2023    Chief Complaint   Patient presents with    Pancytopenia    Follow-up     Pt refused  services preferring son to translate     HPI  Pt is a 83 y.o. male who  has a past medical history of Anxiety, GERD (gastroesophageal reflux disease), Hypertension, Nuclear sclerosis of both eyes (03/08/2021), and Primary osteoarthritis of both knees (04/09/2023). Who presents as a referral for pancytopenia.  Pt's only complaint at time of exam is chronic joint pain.        Interval hx:    Results reviewed with pt and CBC is no longer improving since stopping PO NSAIDS.  Pt still using voltaren gel but stopped all PO NSAIDS.  Pt agreeable to further monitoring and denied any issues at time of exam.  Pt presents with daughter and are agreeable to Bmbx with IR  All questions answered to pt's satisfaction at time of exam.    Active Problem List with Overview Notes    Diagnosis Date Noted    Anxiety 09/24/2023    Insomnia 04/09/2023    Normocytic anemia 04/09/2023    Thrombocytopenia 04/09/2023    Primary osteoarthritis of both knees 04/09/2023    Lumbar spinal stenosis 04/09/2023    Lumbar spondylosis 04/09/2023    Senile purpura 11/22/2022    Episode of recurrent major depressive disorder 11/22/2022    Generalized anxiety disorder 11/22/2022    Hypertension, benign 11/22/2022     BP Readings from Last 3 Encounters:   09/20/23 128/60   08/10/23 132/62   06/13/23 (!) 153/85      ACC/AHA guidelines on blood pressure goals reviewed.  Reinforced correct way of measuring blood pressure.       Refractive error 03/08/2021    Nuclear sclerosis of both eyes 03/08/2021    Benign prostatic hyperplasia with nocturia 12/06/2019    Weakness of trunk musculature 11/27/2017    Poor flexibility of tendon 11/27/2017    Decreased range of motion of hip 11/27/2017    Weakness of both lower extremities 11/27/2017       Patient Active Problem List    Diagnosis Date Noted    Anxiety 09/24/2023     Insomnia 04/09/2023    Normocytic anemia 04/09/2023    Thrombocytopenia 04/09/2023    Primary osteoarthritis of both knees 04/09/2023    Lumbar spinal stenosis 04/09/2023    Lumbar spondylosis 04/09/2023    Senile purpura 11/22/2022    Episode of recurrent major depressive disorder 11/22/2022    Generalized anxiety disorder 11/22/2022    Hypertension, benign 11/22/2022    Refractive error 03/08/2021    Nuclear sclerosis of both eyes 03/08/2021    Benign prostatic hyperplasia with nocturia 12/06/2019    Weakness of trunk musculature 11/27/2017    Poor flexibility of tendon 11/27/2017    Decreased range of motion of hip 11/27/2017    Weakness of both lower extremities 11/27/2017     Past Medical History:   Diagnosis Date    Anxiety     GERD (gastroesophageal reflux disease)     Hypertension     Nuclear sclerosis of both eyes 03/08/2021    Primary osteoarthritis of both knees 04/09/2023      No past surgical history on file.   (Not in a hospital admission)    Review of patient's allergies indicates:  No Known Allergies   Social History     Tobacco Use    Smoking status: Never    Smokeless tobacco: Never   Substance Use Topics    Alcohol use: Not Currently      Family History   Problem Relation Age of Onset    No Known Problems Mother     No Known Problems Father     No Known Problems Sister     No Known Problems Brother     No Known Problems Maternal Aunt     No Known Problems Maternal Uncle     No Known Problems Paternal Aunt     No Known Problems Paternal Uncle     No Known Problems Maternal Grandmother     No Known Problems Maternal Grandfather     No Known Problems Paternal Grandmother     No Known Problems Paternal Grandfather     No Known Problems Other     Amblyopia Neg Hx     Blindness Neg Hx     Cancer Neg Hx     Cataracts Neg Hx     Diabetes Neg Hx     Glaucoma Neg Hx     Hypertension Neg Hx     Macular degeneration Neg Hx     Retinal detachment Neg Hx     Strabismus Neg Hx     Stroke Neg Hx     Thyroid  disease Neg Hx     Colon cancer Neg Hx     Esophageal cancer Neg Hx         Review of Systems :  Review of Systems   Constitutional:  Negative for fever, malaise/fatigue and weight loss.   HENT:  Negative for congestion and hearing loss.    Eyes:  Negative for blurred vision and pain.   Respiratory:  Negative for cough, sputum production and shortness of breath.    Cardiovascular:  Negative for chest pain, palpitations and claudication.   Gastrointestinal:  Negative for abdominal pain, blood in stool, constipation, diarrhea, heartburn, nausea and vomiting.   Genitourinary:  Negative for dysuria and hematuria.   Musculoskeletal:  Positive for back pain, joint pain and myalgias. Negative for falls and neck pain.   Skin:  Negative for itching and rash.   Neurological:  Negative for dizziness, focal weakness and weakness.   Endo/Heme/Allergies:  Does not bruise/bleed easily.   Psychiatric/Behavioral:  Negative for depression. The patient is not nervous/anxious.        Physical Exam :  Wt Readings from Last 3 Encounters:   11/24/23 92.2 kg (203 lb 4.2 oz)   09/20/23 90.7 kg (199 lb 15.3 oz)   08/10/23 86.4 kg (190 lb 7.6 oz)     Temp Readings from Last 3 Encounters:   09/20/23 98.6 °F (37 °C) (Oral)   08/10/23 98.4 °F (36.9 °C) (Oral)   04/13/23 99.1 °F (37.3 °C) (Oral)     BP Readings from Last 3 Encounters:   11/24/23 129/79   09/20/23 128/60   08/10/23 132/62     Pulse Readings from Last 3 Encounters:   11/24/23 70   09/20/23 81   08/10/23 73     Body mass index is 30.91 kg/m².    Physical Exam  Vitals reviewed.   Constitutional:       Appearance: Normal appearance.      Comments: Uses walker   HENT:      Head: Normocephalic and atraumatic.      Nose: Nose normal.      Mouth/Throat:      Mouth: Mucous membranes are moist.   Eyes:      Pupils: Pupils are equal, round, and reactive to light.   Cardiovascular:      Rate and Rhythm: Normal rate.      Heart sounds: Normal heart sounds.   Pulmonary:      Effort: Pulmonary  effort is normal.      Breath sounds: Normal breath sounds.   Abdominal:      General: Abdomen is flat.   Musculoskeletal:      Cervical back: Neck supple.      Right lower leg: No edema.      Left lower leg: No edema.   Skin:     General: Skin is warm and dry.   Neurological:      Mental Status: He is alert. Mental status is at baseline.   Psychiatric:         Mood and Affect: Mood normal.         Behavior: Behavior normal.           Pertinent Diagnostic studies:    No results found for this or any previous visit (from the past 24 hour(s)).    Assessment/Plan :       Anemia/Thrombocytopenia-stable  -Mild and worsening this past year but now stable  -most likely multifactorial but could primarily be due to hx of heavy NSAID use, age and co morbidities  -Full anemia workup largely unremarkable  -But path rev did show White blood cells- normal in number and in differential count   -Rare neutrophil with pelgeroid nuclei on path rev of CBC seen on path rev  -Discussed bone marrow bx due to pancytopenia no longer improving  -Will schedule BMBX next month with IR and RTC in 3 weeks after bx is done for MD visit      Hypertension, benign  -stable on current meds  -PCP working to adjust       Hx of depression/Anxiety  -Denies SI or HI  -On cymbalta  -Per PCP        Benign prostatic hyperplasia with nocturia  - Controlled on doxazosin  -Per PCP        Primary osteoarthritis of both knees  - Partially controled on Voltaren gel  -Per PCP       Lumbar radiculopathy/Degenerative disc disease, lumbar/Chronic midline low back pain without sciatica  -Chronic   -On gabapentin  -Pt considering alternative pain meds and agreeable to think about pain management, clinic number provided  -Per PCP          Time spent on case: 30 minutes      Summary of orders placed this encounter:  Orders Placed This Encounter   Procedures    Bone marrow    IR Biopsy Bone deep    Leukemia/Lymphoma Screen - Bone Marrow Right Posterior Iliac Crest    Heme  Disorders DNA/RNA Hold, Bone Marrow       Future Appointments   Date Time Provider Department Center   12/11/2023  1:00 PM Ken Franks PA-C [facility]   12/14/2023  2:30 PM Klaudia Mccall MD [facility] RHEUM [address]   12/21/2023 10:15 AM Edouard Dallas DO [facility] PAINMA [place]   3/15/2024  8:30 AM LAB, [facility] DRAW STATION [facility] LAB [facility]   3/21/2024 10:00 AM Maxx Mcintosh Jr., MD [facility]           Henrique Patton MD   Hematology/oncology, [facility]

## 2023-11-28 ENCOUNTER — HOSPITAL ENCOUNTER (OUTPATIENT)
Dept: PREADMISSION TESTING | Facility: HOSPITAL | Age: 83
Discharge: HOME | End: 2023-11-28
Attending: RADIOLOGY
Payer: MEDICARE

## 2023-11-28 VITALS
TEMPERATURE: 97 F | WEIGHT: 202.81 LBS | SYSTOLIC BLOOD PRESSURE: 142 MMHG | DIASTOLIC BLOOD PRESSURE: 81 MMHG | HEIGHT: 68 IN | HEART RATE: 62 BPM | RESPIRATION RATE: 16 BRPM | BODY MASS INDEX: 30.74 KG/M2 | OXYGEN SATURATION: 98 %

## 2023-11-28 DIAGNOSIS — Z01.818 PRE-OP TESTING: Primary | ICD-10-CM

## 2023-11-28 DIAGNOSIS — R79.1 ABNORMAL COAGULATION PROFILE: ICD-10-CM

## 2023-11-28 LAB
ANION GAP SERPL CALC-SCNC: 5 MMOL/L (ref 8–16)
BUN SERPL-MCNC: 14 MG/DL (ref 8–23)
CALCIUM SERPL-MCNC: 9.2 MG/DL (ref 8.7–10.5)
CHLORIDE SERPL-SCNC: 106 MMOL/L (ref 95–110)
CO2 SERPL-SCNC: 27 MMOL/L (ref 23–29)
CREAT SERPL-MCNC: 0.8 MG/DL (ref 0.5–1.4)
EST. GFR  (NO RACE VARIABLE): >60 ML/MIN/1.73 M^2
GLUCOSE SERPL-MCNC: 103 MG/DL (ref 70–110)
INR PPP: 1 (ref 0.8–1.2)
POTASSIUM SERPL-SCNC: 4.2 MMOL/L (ref 3.5–5.1)
PROTHROMBIN TIME: 10.2 SEC (ref 9–12.5)
SODIUM SERPL-SCNC: 138 MMOL/L (ref 136–145)

## 2023-11-28 PROCEDURE — 36415 COLL VENOUS BLD VENIPUNCTURE: CPT | Mod: HCNC | Performed by: RADIOLOGY

## 2023-11-28 PROCEDURE — 85610 PROTHROMBIN TIME: CPT | Mod: HCNC | Performed by: RADIOLOGY

## 2023-11-28 PROCEDURE — 80048 BASIC METABOLIC PNL TOTAL CA: CPT | Mod: HCNC | Performed by: RADIOLOGY

## 2023-11-28 RX ORDER — ACETAMINOPHEN 325 MG/1
325 TABLET ORAL EVERY 6 HOURS PRN
COMMUNITY

## 2023-11-28 NOTE — DISCHARGE INSTRUCTIONS
YOUR PROCEDURE WILL BE AT [facility] at [address] [place] [address]                  Before 7 AM, enter through the Emergency Entrance..   After 7 AM enter through the Main Entrance.                 Report to the Same Day Surgery Registration Desk in the hallway.(Just beside the Same Day Surgery Unit)      Your procedure  is scheduled for ___12/5/23_______.    Call [phone] between 2pm and 5pm on __12/4/23_____to find out your arrival time for the day of surgery.    You may have two visitors.  No children under 12 years old.     You will be going to the Same Day Surgery Unit on the 2nd floor of the hospital.    Important instructions:  Do not eat anything after midnight.  You may have plain water, non carbonated.  You may also have Gatorade or Powerade after midnight.    Stop all fluids 2 hours before your surgery.    It is okay to brush your teeth.  Do not have gum, candy or mints.    SEE MEDICATION SHEET.   TAKE MEDICATIONS AS DIRECTED WITH SIPS OF WATER.      Do not take any diabetic medication on the morning of surgery unless instructed to do so by your doctor or pre op nurse.      STOP taking Aspirin, Ibuprofen,  Advil, Motrin, Mobic(meloxicam), Aleve (naproxen), Fish oil, and Vitamin E for at least 7 days before your surgery.     You may take Tylenol if needed which is not a blood thinner.    Please shower the night before and the morning of your surgery.      Follow any Prep Instructions given by your surgeon.    Use Chlorhexidine soap as instructed by your pre op nurse.   Please place clean linens on your bed the night before surgery. Please wear fresh clean clothing after each shower.    No shaving of procedural area at least 4-5 days before surgery due to increased risk of skin irritation and/or possible infection.    Contact lenses and removable denture work may not be worn during your procedure.    You may wear deodorant only. If you are having breast surgery,  do not wear deodorant on the operative side.    Do not wear powder, body lotion, perfume/cologne or make-up.    Do not wear any jewelry or have any metal on your body.    You will be asked to remove any dentures or partials for the procedure.    If you are going home on the same day of surgery, you must arrange for a family member or a friend to drive you home.  Public transportation is prohibited.  You will not be able to drive home if you were given anesthesia or sedation.    Patients who want to have their Post-op prescriptions filled from our in-house [facility], bring a Credit/Debit Card or cash with you. A co-pay may be required.  The pharmacy closes at 5:30 pm.    Wear loose fitting clothes allowing for bandages.    Please leave money and valuables home.      You may bring your cell phone.    Call the doctor if fever or illness should occur before your surgery.    Call [phone] to contact us here if needed.                            CLOTHES ON DAY OF SURGERY      ABDOMINAL surgery:  wear loose, comfortable clothing.  Nothing tight around the abdomen.  NO JEANS

## 2023-11-28 NOTE — PLAN OF CARE
Pre-operative instructions, medication directives and pain scales reviewed with patient. All questions the patient had were answered. Re-assurance about surgical procedure and day of surgery routine given as needed, patient verbalized understanding of the pre-op instructions.  Patient instructed to report to [facility] for surgery.

## 2023-12-05 ENCOUNTER — HOSPITAL ENCOUNTER (OUTPATIENT)
Dept: INTERVENTIONAL RADIOLOGY/VASCULAR | Facility: HOSPITAL | Age: 83
Discharge: HOME | End: 2023-12-05
Attending: STUDENT IN AN ORGANIZED HEALTH CARE EDUCATION/TRAINING PROGRAM | Admitting: RADIOLOGY
Payer: MEDICARE

## 2023-12-05 VITALS
RESPIRATION RATE: 16 BRPM | HEART RATE: 89 BPM | OXYGEN SATURATION: 95 % | TEMPERATURE: 98 F | SYSTOLIC BLOOD PRESSURE: 109 MMHG | DIASTOLIC BLOOD PRESSURE: 72 MMHG

## 2023-12-05 DIAGNOSIS — D61.818 PANCYTOPENIA: ICD-10-CM

## 2023-12-05 PROCEDURE — 88305 TISSUE EXAM BY PATHOLOGIST: CPT | Mod: 26,HCNC,, | Performed by: PATHOLOGY

## 2023-12-05 PROCEDURE — 99152 MOD SED SAME PHYS/QHP 5/>YRS: CPT | Mod: HCNC

## 2023-12-05 PROCEDURE — 99152 IR BIOPSY BONE DEEP: ICD-10-PCS | Mod: HCNC,,, | Performed by: RADIOLOGY

## 2023-12-05 PROCEDURE — 85097 PR  BONE MARROW,SMEAR INTERPRETATION: ICD-10-PCS | Mod: HCNC,,, | Performed by: PATHOLOGY

## 2023-12-05 PROCEDURE — C1830 POWER BONE MARROW BX NEEDLE: HCPCS | Mod: HCNC

## 2023-12-05 PROCEDURE — 88305 TISSUE EXAM BY PATHOLOGIST: CPT | Mod: 59,HCNC | Performed by: PATHOLOGY

## 2023-12-05 PROCEDURE — 88313 SPECIAL STAINS GROUP 2: CPT | Mod: 26,HCNC,, | Performed by: PATHOLOGY

## 2023-12-05 PROCEDURE — 88305 TISSUE EXAM BY PATHOLOGIST: ICD-10-PCS | Mod: 26,HCNC,, | Performed by: PATHOLOGY

## 2023-12-05 PROCEDURE — 88341 IMHCHEM/IMCYTCHM EA ADD ANTB: CPT | Mod: HCNC | Performed by: PATHOLOGY

## 2023-12-05 PROCEDURE — 36415 COLL VENOUS BLD VENIPUNCTURE: CPT | Mod: HCNC | Performed by: STUDENT IN AN ORGANIZED HEALTH CARE EDUCATION/TRAINING PROGRAM

## 2023-12-05 PROCEDURE — 88365 INSITU HYBRIDIZATION (FISH): CPT | Mod: 26,HCNC,, | Performed by: PATHOLOGY

## 2023-12-05 PROCEDURE — 88311 DECALCIFY TISSUE: CPT | Mod: HCNC | Performed by: PATHOLOGY

## 2023-12-05 PROCEDURE — 88184 FLOWCYTOMETRY/ TC 1 MARKER: CPT | Mod: HCNC | Performed by: PATHOLOGY

## 2023-12-05 PROCEDURE — 99153 MOD SED SAME PHYS/QHP EA: CPT | Mod: HCNC | Performed by: RADIOLOGY

## 2023-12-05 PROCEDURE — 88364 INSITU HYBRIDIZATION (FISH): CPT | Mod: HCNC | Performed by: PATHOLOGY

## 2023-12-05 PROCEDURE — 85097 BONE MARROW INTERPRETATION: CPT | Mod: HCNC,,, | Performed by: PATHOLOGY

## 2023-12-05 PROCEDURE — 99203 PR OFFICE/OUTPT VISIT, NEW, LEVL III, 30-44 MIN: ICD-10-PCS | Mod: HCNC,25,, | Performed by: RADIOLOGY

## 2023-12-05 PROCEDURE — 88342 IMHCHEM/IMCYTCHM 1ST ANTB: CPT | Mod: HCNC,59 | Performed by: PATHOLOGY

## 2023-12-05 PROCEDURE — 88299 UNLISTED CYTOGENETIC STUDY: CPT | Mod: HCNC | Performed by: STUDENT IN AN ORGANIZED HEALTH CARE EDUCATION/TRAINING PROGRAM

## 2023-12-05 PROCEDURE — 88341 IMHCHEM/IMCYTCHM EA ADD ANTB: CPT | Mod: 26,HCNC,, | Performed by: PATHOLOGY

## 2023-12-05 PROCEDURE — 88313 SPECIAL STAINS GROUP 2: CPT | Mod: 59,HCNC | Performed by: PATHOLOGY

## 2023-12-05 PROCEDURE — 88341 PR IHC OR ICC EACH ADD'L SINGLE ANTIBODY  STAINPR: ICD-10-PCS | Mod: 26,HCNC,, | Performed by: PATHOLOGY

## 2023-12-05 PROCEDURE — 88342 CHG IMMUNOCYTOCHEMISTRY: ICD-10-PCS | Mod: 26,59,HCNC, | Performed by: PATHOLOGY

## 2023-12-05 PROCEDURE — 25000003 PHARM REV CODE 250: Mod: HCNC | Performed by: RADIOLOGY

## 2023-12-05 PROCEDURE — 88365 INSITU HYBRIDIZATION (FISH): CPT | Mod: HCNC | Performed by: PATHOLOGY

## 2023-12-05 PROCEDURE — 88311 DECALCIFY TISSUE: CPT | Mod: 26,HCNC,, | Performed by: PATHOLOGY

## 2023-12-05 PROCEDURE — 99203 OFFICE O/P NEW LOW 30 MIN: CPT | Mod: HCNC,25,, | Performed by: RADIOLOGY

## 2023-12-05 PROCEDURE — 88342 IMHCHEM/IMCYTCHM 1ST ANTB: CPT | Mod: 26,59,HCNC, | Performed by: PATHOLOGY

## 2023-12-05 PROCEDURE — 88365 PR  TISSUE HYBRIDIZATION: ICD-10-PCS | Mod: 26,HCNC,, | Performed by: PATHOLOGY

## 2023-12-05 PROCEDURE — 63600175 PHARM REV CODE 636 W HCPCS: Mod: HCNC | Performed by: RADIOLOGY

## 2023-12-05 PROCEDURE — 88313 PR  SPECIAL STAINS,GROUP II: ICD-10-PCS | Mod: 26,HCNC,, | Performed by: PATHOLOGY

## 2023-12-05 PROCEDURE — 38222 DX BONE MARROW BX & ASPIR: CPT | Mod: HCNC,RT,, | Performed by: RADIOLOGY

## 2023-12-05 PROCEDURE — 38222 IR BIOPSY BONE DEEP: ICD-10-PCS | Mod: HCNC,RT,, | Performed by: RADIOLOGY

## 2023-12-05 PROCEDURE — 88185 FLOWCYTOMETRY/TC ADD-ON: CPT | Mod: HCNC | Performed by: PATHOLOGY

## 2023-12-05 PROCEDURE — 99152 MOD SED SAME PHYS/QHP 5/>YRS: CPT | Mod: HCNC | Performed by: RADIOLOGY

## 2023-12-05 PROCEDURE — 88189 FLOWCYTOMETRY/READ 16 & >: CPT | Mod: HCNC,,, | Performed by: PATHOLOGY

## 2023-12-05 PROCEDURE — 88189 PR  FLOWCYTOMETRY/READ, 16 & > MARKERS: ICD-10-PCS | Mod: HCNC,,, | Performed by: PATHOLOGY

## 2023-12-05 PROCEDURE — 88311 PR  DECALCIFY TISSUE: ICD-10-PCS | Mod: 26,HCNC,, | Performed by: PATHOLOGY

## 2023-12-05 RX ORDER — SODIUM CHLORIDE 9 MG/ML
INJECTION, SOLUTION INTRAVENOUS
Status: COMPLETED | OUTPATIENT
Start: 2023-12-05 | End: 2023-12-05

## 2023-12-05 RX ORDER — MORPHINE SULFATE 10 MG/ML
2 INJECTION INTRAMUSCULAR; INTRAVENOUS; SUBCUTANEOUS
Status: DISCONTINUED | OUTPATIENT
Start: 2023-12-05 | End: 2023-12-06 | Stop reason: HOSPADM

## 2023-12-05 RX ORDER — HYDROCODONE BITARTRATE AND ACETAMINOPHEN 5; 325 MG/1; MG/1
1 TABLET ORAL EVERY 4 HOURS PRN
Status: DISCONTINUED | OUTPATIENT
Start: 2023-12-05 | End: 2023-12-06 | Stop reason: HOSPADM

## 2023-12-05 RX ORDER — FENTANYL CITRATE 50 UG/ML
INJECTION, SOLUTION INTRAMUSCULAR; INTRAVENOUS
Status: COMPLETED | OUTPATIENT
Start: 2023-12-05 | End: 2023-12-05

## 2023-12-05 RX ORDER — LIDOCAINE HYDROCHLORIDE 10 MG/ML
INJECTION INFILTRATION; PERINEURAL
Status: COMPLETED | OUTPATIENT
Start: 2023-12-05 | End: 2023-12-05

## 2023-12-05 RX ORDER — MIDAZOLAM HYDROCHLORIDE 1 MG/ML
INJECTION INTRAMUSCULAR; INTRAVENOUS
Status: COMPLETED | OUTPATIENT
Start: 2023-12-05 | End: 2023-12-05

## 2023-12-05 RX ADMIN — LIDOCAINE HYDROCHLORIDE 5 ML: 10 INJECTION, SOLUTION INFILTRATION; PERINEURAL at 08:12

## 2023-12-05 RX ADMIN — FENTANYL CITRATE 25 MCG: 50 INJECTION, SOLUTION INTRAMUSCULAR; INTRAVENOUS at 08:12

## 2023-12-05 RX ADMIN — MIDAZOLAM HYDROCHLORIDE 1 MG: 1 INJECTION INTRAMUSCULAR; INTRAVENOUS at 08:12

## 2023-12-05 RX ADMIN — MIDAZOLAM HYDROCHLORIDE 0.5 MG: 1 INJECTION INTRAMUSCULAR; INTRAVENOUS at 08:12

## 2023-12-05 RX ADMIN — SODIUM CHLORIDE 250 ML: 0.9 INJECTION, SOLUTION INTRAVENOUS at 08:12

## 2023-12-05 RX ADMIN — FENTANYL CITRATE 50 MCG: 50 INJECTION, SOLUTION INTRAMUSCULAR; INTRAVENOUS at 08:12

## 2023-12-05 NOTE — Clinical Note
Right: Back.   Scrubbed with Chlorhexidine/Alcohol.    Hair: N/A.  Skin prep dry before draping.  Prepped by: Michael Brown MD 12/5/2023 8:15 AM.

## 2023-12-05 NOTE — DISCHARGE SUMMARY
Radiology Discharge Summary      Hospital Course: No complications    Admit Date: 12/5/2023  Discharge Date: 12/05/2023     Instructions Given to Patient: Yes    Diet: Resume prior diet    Activity: No driving for today. No heavy lifting, strenuous activity, exercising or submerging in water x 2 days.    Description of Condition on Discharge: Stable    Vital Signs (Most Recent): Temp: 98.1 °F (36.7 °C) (12/05/23 0715)  Pulse: 98 (12/05/23 0836)  Resp: 16 (12/05/23 0836)  BP: (!) 101/51 (12/05/23 0836)  SpO2: 95 % (12/05/23 0836)    Discharge Disposition: Home    Discharge Diagnosis:  83 y.o. male with PMHx of GERD, BPH, HTN and chronic pancytopenia of uncertain etiology with concerns for bone marrow process as possible etiology requiring further evaluation.    Patient is now s/p successful CT-guided percutaneous Rt posterior iliac crest-approach bone marrow aspiration and 11-gauge core bone biopsy with local anesthetic and moderate conscious sedation. Patient tolerated the procedure well. No immediate post-procedural complications noted.     No heavy lifting, strenuous activity, exercising or submerging in water x 2 days.    Thank you for considering IR for the care of your patient.     Michael Brown MD  Interventional Radiology

## 2023-12-05 NOTE — BRIEF OP NOTE
Radiology Post-Procedure Note    Pre Op Diagnosis: 1. Pancytopenia  Post Op Diagnosis: Same    Procedure: 1. CT-guided percutaneous Rt posterior iliac crest-approach bone marrow aspiration and 11-gauge core bone biopsy    Procedure performed by: Michael Brown MD    Written Informed Consent Obtained: Yes  Specimen Removed: YES, 30-cc's BM aspirate and 1.0-cm, 11-G core bone sample  Estimated Blood Loss: Minimal    Findings:  Successful CT-guided percutaneous Rt posterior iliac crest-approach bone marrow aspiration and 11-gauge core bone biopsy with local anesthetic and moderate conscious sedation. Patient tolerated the procedure well. No immediate post-procedural complications noted.     [facility] for 1 hours post-op monitoring and bed rest prior to tentative discharge.    No heavy lifting, strenuous activity, exercising or submerging in water x 2 days.    Thank you for considering IR for the care of your patient.     Michael Brown MD  Interventional Radiology

## 2023-12-05 NOTE — H&P
Radiology History & Physical      SUBJECTIVE:     Principal Problem: Pancytopenia    History of Present Illness:  Javid Daniel is a 83 y.o. male with PMHx of GERD, BPH, HTN and chronic pancytopenia of uncertain etiology with concerns for bone marrow process as possible etiology requiring further evaluation.    A new outpatient IR consult received for CT-guided percutaneous Rt posterior iliac crest-approach bone marrow aspiration and 11-gauge core bone biopsy.    Past Medical History:   Diagnosis Date    Anxiety     GERD (gastroesophageal reflux disease)     Hypertension     Nuclear sclerosis of both eyes 03/08/2021    Primary osteoarthritis of both knees 04/09/2023     History reviewed. No pertinent surgical history.    Home Meds:   Prior to Admission medications    Medication Sig Start Date End Date Taking? Authorizing Provider   amLODIPine (NORVASC) 10 MG tablet Take 1 tablet (10 mg total) by mouth once daily. 9/20/23  Yes Maxx Mcintosh Jr., MD   busPIRone (BUSPAR) 15 MG tablet Take 1 tablet (15 mg total) by mouth 3 (three) times daily. 11/6/23  Yes Ken Franks PA-C   carvediloL (COREG) 12.5 MG tablet Take 1 tablet (12.5 mg total) by mouth 2 (two) times daily with meals. 9/20/23  Yes Maxx Mcintosh Jr., MD   diclofenac sodium (VOLTAREN) 1 % Gel APPLY 3 TO 4 GRAMS ONTO THE AFFECTED AREA OF THE SKIN BID 6/13/23  Yes Henrique Patton MD   doxazosin (CARDURA) 4 MG tablet Take 1 tablet (4 mg total) by mouth every evening. 9/20/23  Yes Maxx Mcitnosh Jr., MD   DULoxetine (CYMBALTA) 20 MG capsule Take 1 capsule (20 mg total) by mouth once daily. 11/6/23 2/4/24 Yes Ken Franks PA-C   fluticasone propionate (FLONASE) 50 mcg/actuation nasal spray SHAKE LIQUID AND USE 2 SPRAYS(100 MCG) IN EACH NOSTRIL EVERY DAY 9/20/23  Yes Maxx Mcintosh Jr., MD   gabapentin (NEURONTIN) 600 MG tablet Take 1 tablet (600 mg total) by mouth 3 (three) times daily. Take 2 or 3 times a day. 5/1/23 4/25/24 Yes  Edouard Dallas DO   HYDROcodone-acetaminophen (NORCO) 5-325 mg per tablet Take 1 tablet by mouth daily as needed. 2/25/23  Yes Josh Arrington MD   levocetirizine (XYZAL) 5 MG tablet Take 1 tablet (5 mg total) by mouth every evening. 9/20/23  Yes Maxx Mcintosh Jr., MD   mirtazapine (REMERON) 7.5 MG Tab Take 1 tablet (7.5 mg total) by mouth every evening. 11/6/23 1/5/24 Yes Ken Franks PA-C   olmesartan (BENICAR) 40 MG tablet Take 1 tablet (40 mg total) by mouth once daily. 9/20/23  Yes Maxx Mcintosh Jr., MD   pantoprazole (PROTONIX) 40 MG tablet Take 1 tablet (40 mg total) by mouth once daily. 9/20/23 9/19/24 Yes Maxx Mcintosh Jr., MD   acetaminophen (TYLENOL) 325 MG tablet Take 325 mg by mouth every 6 (six) hours as needed for Pain.    Provider, Historical   diclofenac (VOLTAREN) 50 MG EC tablet Take 50 mg by mouth 2 (two) times daily with meals. 3/15/23 12/5/23  Maxwell Ferrell MD   neomycin-polymyxin-dexamethasone (DEXACINE) 3.5 mg/g-10,000 unit/g-0.1 % Oint Place into the right eye 3 (three) times daily.  Patient not taking: Reported on 11/6/2023 6/9/23 12/5/23  Roxane Sanders OD     Anticoagulants/Antiplatelets: no anticoagulation    Allergies: Review of patient's allergies indicates:  No Known Allergies    Sedation History:  no adverse reactions    Review of Systems:   Hematological: no known coagulopathies  Respiratory: no cough, shortness of breath, or wheezing  Cardiovascular: no chest pain or dyspnea on exertion  Gastrointestinal: no abdominal pain, change in bowel habits, or black or bloody stools  Genito-Urinary: no dysuria, trouble voiding, or hematuria  Musculoskeletal: positive for - joint pain and joint stiffness  Neurological: no TIA or stroke symptoms       OBJECTIVE:     Vital Signs (Most Recent)  Temp: 98.1 °F (36.7 °C) (12/05/23 0715)  Pulse: 76 (12/05/23 0715)  Resp: 18 (12/05/23 0715)  BP: 133/85 (12/05/23 0715)  SpO2: 97 % (12/05/23 0715)    Physical Exam:  ASA:  II  Mallampati: II    General: no acute distress  Mental Status: alert and oriented to person, place and time  HEENT: normocephalic, atraumatic  Chest: unlabored breathing  Heart: regular heart rate  Abdomen: nondistended  Extremity: moves all extremities    Laboratory  Lab Results   Component Value Date    INR 1.0 11/28/2023       Lab Results   Component Value Date    WBC 2.84 (L) 11/13/2023    HGB 11.5 (L) 11/13/2023    HCT 36.7 (L) 11/13/2023    MCV 97 11/13/2023     (L) 11/13/2023      Lab Results   Component Value Date     11/28/2023     11/28/2023    K 4.2 11/28/2023     11/28/2023    CO2 27 11/28/2023    BUN 14 11/28/2023    CREATININE 0.8 11/28/2023    CALCIUM 9.2 11/28/2023    ALT 10 09/15/2023    AST 16 09/15/2023    ALBUMIN 3.8 09/15/2023    BILITOT 0.9 09/15/2023     ASSESSMENT/PLAN:     83 y.o. male with PMHx of GERD, BPH, HTN and chronic pancytopenia of uncertain etiology with concerns for bone marrow process as possible etiology requiring further evaluation.    Pancytopenia - Will attempt CT-guided percutaneous Rt posterior iliac crest-approach bone marrow aspiration and 11-gauge core bone biopsy with local anesthetic and moderate conscious sedation.    Risks (including, but not limited to, pain, bleeding, infection, damage to nearby structures, failure to obtain sufficient material for a diagnosis, the need for additional procedures, and death), benefits, and alternatives were discussed with the patient. All questions were answered to the best of my abilities. The patient wishes to proceed with the procedure. Written informed consent was obtained.    Thank you for considering IR for the care of your patient.     Michael Brown MD  Interventional Radiology

## 2023-12-06 LAB
BODY SITE - BONE MARROW: NORMAL
CLINICAL DIAGNOSIS - BONE MARROW: NORMAL
FLOW CYTOMETRY ANTIBODIES ANALYZED - BONE MARROW: NORMAL
FLOW CYTOMETRY COMMENT - BONE MARROW: NORMAL
FLOW CYTOMETRY INTERPRETATION - BONE MARROW: NORMAL

## 2023-12-07 LAB
DNA/RNA EXTRACT AND HOLD RESULT: NORMAL
DNA/RNA EXTRACTION: NORMAL
EXHR SPECIMEN TYPE: NORMAL

## 2023-12-08 DIAGNOSIS — M17.0 PRIMARY OSTEOARTHRITIS OF BOTH KNEES: Chronic | ICD-10-CM

## 2023-12-11 ENCOUNTER — TELEPHONE (OUTPATIENT)
Dept: FAMILY MEDICINE | Facility: CLINIC | Age: 83
End: 2023-12-11
Payer: MEDICARE

## 2023-12-11 ENCOUNTER — OFFICE VISIT (OUTPATIENT)
Dept: PSYCHIATRY | Facility: CLINIC | Age: 83
End: 2023-12-11
Payer: MEDICARE

## 2023-12-11 VITALS
HEIGHT: 68 IN | BODY MASS INDEX: 30.47 KG/M2 | OXYGEN SATURATION: 96 % | SYSTOLIC BLOOD PRESSURE: 145 MMHG | WEIGHT: 201.06 LBS | DIASTOLIC BLOOD PRESSURE: 84 MMHG | HEART RATE: 70 BPM

## 2023-12-11 DIAGNOSIS — G47.00 INSOMNIA, UNSPECIFIED TYPE: Primary | ICD-10-CM

## 2023-12-11 DIAGNOSIS — F33.0 MDD (MAJOR DEPRESSIVE DISORDER), RECURRENT EPISODE, MILD: ICD-10-CM

## 2023-12-11 DIAGNOSIS — F41.1 GAD (GENERALIZED ANXIETY DISORDER): ICD-10-CM

## 2023-12-11 LAB
FINAL PATHOLOGIC DIAGNOSIS: NORMAL
GROSS: NORMAL
Lab: NORMAL
MICROSCOPIC EXAM: NORMAL

## 2023-12-11 PROCEDURE — 90785 PSYTX COMPLEX INTERACTIVE: CPT | Mod: HCNC,S$GLB,,

## 2023-12-11 PROCEDURE — 3077F PR MOST RECENT SYSTOLIC BLOOD PRESSURE >= 140 MM HG: ICD-10-PCS | Mod: HCNC,CPTII,S$GLB,

## 2023-12-11 PROCEDURE — 99215 PR OFFICE/OUTPT VISIT, EST, LEVL V, 40-54 MIN: ICD-10-PCS | Mod: HCNC,S$GLB,,

## 2023-12-11 PROCEDURE — 99215 OFFICE O/P EST HI 40 MIN: CPT | Mod: HCNC,S$GLB,,

## 2023-12-11 PROCEDURE — 3077F SYST BP >= 140 MM HG: CPT | Mod: HCNC,CPTII,S$GLB,

## 2023-12-11 PROCEDURE — 90833 PR PSYCHOTHERAPY W/PATIENT W/E&M, 30 MIN (ADD ON): ICD-10-PCS | Mod: HCNC,S$GLB,,

## 2023-12-11 PROCEDURE — 3079F PR MOST RECENT DIASTOLIC BLOOD PRESSURE 80-89 MM HG: ICD-10-PCS | Mod: HCNC,CPTII,S$GLB,

## 2023-12-11 PROCEDURE — 99999 PR PBB SHADOW E&M-EST. PATIENT-LVL III: ICD-10-PCS | Mod: PBBFAC,HCNC,,

## 2023-12-11 PROCEDURE — 1159F PR MEDICATION LIST DOCUMENTED IN MEDICAL RECORD: ICD-10-PCS | Mod: HCNC,CPTII,S$GLB,

## 2023-12-11 PROCEDURE — 1159F MED LIST DOCD IN RCRD: CPT | Mod: HCNC,CPTII,S$GLB,

## 2023-12-11 PROCEDURE — 3079F DIAST BP 80-89 MM HG: CPT | Mod: HCNC,CPTII,S$GLB,

## 2023-12-11 PROCEDURE — 99999 PR PBB SHADOW E&M-EST. PATIENT-LVL III: CPT | Mod: PBBFAC,HCNC,,

## 2023-12-11 PROCEDURE — 90833 PSYTX W PT W E/M 30 MIN: CPT | Mod: HCNC,S$GLB,,

## 2023-12-11 PROCEDURE — 1160F PR REVIEW ALL MEDS BY PRESCRIBER/CLIN PHARMACIST DOCUMENTED: ICD-10-PCS | Mod: HCNC,CPTII,S$GLB,

## 2023-12-11 PROCEDURE — 1160F RVW MEDS BY RX/DR IN RCRD: CPT | Mod: HCNC,CPTII,S$GLB,

## 2023-12-11 PROCEDURE — 90785 PR INTERACTIVE COMPLEXITY: ICD-10-PCS | Mod: HCNC,S$GLB,,

## 2023-12-11 RX ORDER — DULOXETIN HYDROCHLORIDE 20 MG/1
20 CAPSULE, DELAYED RELEASE ORAL DAILY
Qty: 90 CAPSULE | Refills: 2 | Status: SHIPPED | OUTPATIENT
Start: 2023-12-11 | End: 2024-09-06

## 2023-12-11 RX ORDER — MIRTAZAPINE 15 MG/1
15 TABLET, FILM COATED ORAL NIGHTLY
Qty: 90 TABLET | Refills: 2 | Status: SHIPPED | OUTPATIENT
Start: 2023-12-11 | End: 2024-09-06

## 2023-12-11 RX ORDER — BUSPIRONE HYDROCHLORIDE 15 MG/1
15 TABLET ORAL 3 TIMES DAILY
Qty: 270 TABLET | Refills: 2 | Status: SHIPPED | OUTPATIENT
Start: 2023-12-11

## 2023-12-11 NOTE — TELEPHONE ENCOUNTER
----- Message from Nicole Wendy sent at 12/11/2023  2:45 PM CST -----  Regarding: self .[phone]  .Type: RX Refill Request    Who Called: self     Have you contacted your pharmacy: no     Refill or New Rx: refill     RX Name and Strength: diclofenac sodium (VOLTAREN) 1 % Gel    Is this a 30 day or 90 day RX: 90 day     Preferred Pharmacy with phone number .  [id number] - [place] - [address] AT [facility] [address]  [address]  [place] [address]  Phone: [phone] Fax: [phone]      Local or Mail Order: local    Would the patient rather a call back or a response via [facility]? Call back    Best Call Back Number  [phone]

## 2023-12-11 NOTE — PROGRESS NOTES
"Outpatient Psychiatry Follow-Up Visit   12/11/2023    Clinical Status of Patient:  Outpatient (Ambulatory)    Chief Complaint:  Javid Daniel is a 83 y.o. male who presents today for follow-up of depression and anxiety.  Met with patient and son.      This encounter was done using a telephonic . The patient was given the opportunity to ask questions and everything was answered to their satisfaction. Pt voiced understanding of diagnosis and plan. Plan was written on paper and handed to Pt.    Interval History and Content of Current Session 12/11/2023:    Pt is A+Ox 4.  Patients mood is "ok", affect appears congruent and appropriate. Pts thought process is normal and logical.  Pts speech is normal tone, normal rate, normal pitch, normal volume   Linear and logical, friendly and cooperative, normal eye contact, no psychomotor retardation.  Pt is calmly seated in chair during interview. Pt is casually dressed and well groomed.  Pt attended Interview With son (Javid) per Pt request.  Son participates extensively in interview. Patient has difficulty hearing and is not wearing a hearing device. Son states that patient has an appointment within the next three months.    Patient states that he's been doing OK since our previous appointment. States that he discontinued Xanax, denies any symptoms.  States that since starting Remeron he has seen some improvement in sleep. States he sleeps approximately 5 hours per night, and wakes up one time to urinate. Patient endorses loud snoring, daytime grogginess, and states that family has witnessed him stop breathing at night. Patient has a strong family history of DRU.  Overall he says that his mood is well stabilized on medication regiment, is requesting an increase in remeron at this time.    Pt reports taking medications as prescribed and behaving appropriately during interview today.  Denies SI/HI/AVH. Denies side effects of medications.  Pt reports sleeping " "poor and normal appetite.   Denies recreational drug use. Pt reports 0 drinks per week, denies tobacco use, denies Vaping, 1cup of coffee - Caffeine.        Prior visit :  Psych Interview 11/06/2023:   Javid Daniel is a 83 y.o. male with past psychiatric history of MDD and ISHMAEL  presented to for initial evaluation and treatment for anxiety and depression.     Pt is A+Ox 4.  Patients mood is "I need to sleep", affect appears appropriate. Pts thought process is normal and logical.  Pts speech is normal tone, normal rate, normal pitch, normal volume   Cooperative, normal eye contact, no psychomotor retardation.  Pt is calmly seated in chair during interview.  Pt is casually dressed and well groomed.  Pt attended Interview With son (Javid) per Pt request.  Son participates extensively in interview. Patient has difficulty hearing and is not wearing a hearing device. Son states that patient has an appointment within the next three months.     Patient was never previously being seen by Psychiatry, was being treated by PCP.  Pt states that they are currently taking Xanax 0.5mg PRN daily, Buspar 15mg TID, Cymbalta 20mg daily.  Patient states that he has been on Xanax 0.5 MG nightly for insomnia for the last five years. Patient states that he has not taken any other medications for insomnia and is interested in alternatives. Patient is taking opioids occasionally, every other day.  Patient denies anxiety and depression at this time. States that he has stable on Cymbalta 20 MG daily and Buspar 15MG TID, Patient states that he wishes to continue these two medications.        Patient endorses snoring, daytime drowsiness, and daytime napping.  patient has never been evaluated by a sleep specialist. patient has a strong family history of sleep apnea. Patient wakes up approximately three to four times a night to urinate. Patient states that he drinks five to seven glasses of water a day.  Patient states that he is not " interested in seeing a sleep professional at this time.     Patient originally grew up in [facility] [place], immigrated to [place] in 1985. States that he moved to [place] to be closer with family. Son states that patient had a drinking problem in [place] [facility] [place], since moving to the [facility] has not had a drink.     Denies prior hx of psychiatric hospitalizations. Denies hx of suicide attempts. Pt denies hx self harm. Pt denies hx hallucinations.  Pt denies hx of eating disorders.  Pt denies hx trauma. Denies physical/sexual abuse. Pt denies symptoms including nightmares, hypervigilance, flashbacks, avoidance behaviors, and disassociation.     Reports depression today as 0/10, and anxiety as 0/10.  Reports sleeping 2-3 hrs per night, and good appetite.   Denies SI/HI/AVH. Denies side effects of medications.  Pt states that there support consists of wife and son  Denies recreational drug use. Pt reports 0 drinks per week, denies tobacco use, denies Vaping, 1cup of coffee - Caffeine.       Current Medication:  Xanax 0.5mg PRN daily  Buspar 15mg TID  Cymbalta 20mg daily      Past Medications:        DX:  The patient complained of depressed mood with lethargy, decreased appetite , insomnia, psycho-motor retardation, anhedonia, apathy, worsening self-esteem, guilt, decreased concentration and ability to make decisions.      Pt denies hx symptoms/episodes of ruby.     Admits to symptoms of anxiety including excessive anxiety/worry/fear, more days than not, about numerous issues, difficulty controlling the worry, over thinking, rumination, restlessness, poor concentration, fatigue, and increased irritability. Denies panic attacks at this time.        Past Psychiatric History:   Previous Psychiatric Hospitalizations: NO  Previous Medication Trials: NO  History of psychotherapy:  NO  Previous Suicide Attempts: NO  History of Violence:  YES: when drinking, stopped drinking 40 years ago  History of physical/sexual abuse:  NO  Outpatient psychiatric provider(past): NO     Substance Abuse History:   Tobacco: NO  Alcohol: NO  Illicit Substances: NO  Detox/Rehab: NO     Neurological History:   Seizures: NO  Head trauma: NO     Family Psychiatric History: Yes -   Father - anxiety      Social History:  Developmental/Childhood:Achieved all developmental milestones timely  *Education:High School Diploma  Employment Status/Finances:Retired   Relationship Status/Sexual Orientation: : Relationship intact  Children: 3  Housing Status: Home    history:  NO  Access to gun: NO  Denominational: Scientologist  Recreational activities:Time with family  Person patient is closest to/confides in: wife and son     Legal History:   Past Charges/Incarcerations:  No      Review of Systems     Review of Systems   Constitutional:  Negative for weight loss.   HENT:  Negative for tinnitus.    Eyes:  Negative for blurred vision.   Respiratory:  Negative for cough and shortness of breath.    Cardiovascular:  Negative for chest pain.   Gastrointestinal:  Negative for abdominal pain.   Genitourinary:  Negative for dysuria.   Skin:  Negative for rash.   Neurological:  Negative for dizziness, seizures and weakness.   Psychiatric/Behavioral:  Negative for depression, hallucinations, memory loss, substance abuse and suicidal ideas. The patient has insomnia. The patient is not nervous/anxious.        Psychiatric Review Of Systems - Is patient experiencing or having changes in:  sleep: yes  appetite: no  weight: no  energy/anergy: no  interest/pleasure/anhedonia: no  somatic symptoms: no  libido: no  anxiety/panic: no  guilty/hopelessness: no  concentration: no  S.I.B.s/risky behavior: no  Irritability: no  Racing thoughts: no  Impulsive behaviors: no  Paranoia: no  AVH: no      Past Medical, Family and Social History: The patient's past medical, family and social history have been reviewed and updated as appropriate within the electronic medical record - see  encounter notes.      Current Medications:   Medication List with Changes/Refills   Current Medications    ACETAMINOPHEN (TYLENOL) 325 MG TABLET    Take 325 mg by mouth every 6 (six) hours as needed for Pain.    AMLODIPINE (NORVASC) 10 MG TABLET    Take 1 tablet (10 mg total) by mouth once daily.    CARVEDILOL (COREG) 12.5 MG TABLET    Take 1 tablet (12.5 mg total) by mouth 2 (two) times daily with meals.    DICLOFENAC SODIUM (VOLTAREN) 1 % GEL    APPLY 3 TO 4 GRAMS ONTO THE AFFECTED AREA OF THE SKIN BID    DOXAZOSIN (CARDURA) 4 MG TABLET    Take 1 tablet (4 mg total) by mouth every evening.    FLUTICASONE PROPIONATE (FLONASE) 50 MCG/ACTUATION NASAL SPRAY    SHAKE LIQUID AND USE 2 SPRAYS(100 MCG) IN EACH NOSTRIL EVERY DAY    GABAPENTIN (NEURONTIN) 600 MG TABLET    Take 1 tablet (600 mg total) by mouth 3 (three) times daily. Take 2 or 3 times a day.    HYDROCODONE-ACETAMINOPHEN (NORCO) 5-325 MG PER TABLET    Take 1 tablet by mouth daily as needed.    LEVOCETIRIZINE (XYZAL) 5 MG TABLET    Take 1 tablet (5 mg total) by mouth every evening.    OLMESARTAN (BENICAR) 40 MG TABLET    Take 1 tablet (40 mg total) by mouth once daily.    PANTOPRAZOLE (PROTONIX) 40 MG TABLET    Take 1 tablet (40 mg total) by mouth once daily.   Changed and/or Refilled Medications    Modified Medication Previous Medication    BUSPIRONE (BUSPAR) 15 MG TABLET busPIRone (BUSPAR) 15 MG tablet       Take 1 tablet (15 mg total) by mouth 3 (three) times daily.    Take 1 tablet (15 mg total) by mouth 3 (three) times daily.    DULOXETINE (CYMBALTA) 20 MG CAPSULE DULoxetine (CYMBALTA) 20 MG capsule       Take 1 capsule (20 mg total) by mouth once daily.    Take 1 capsule (20 mg total) by mouth once daily.    MIRTAZAPINE (REMERON) 15 MG TABLET mirtazapine (REMERON) 7.5 MG Tab       Take 1 tablet (15 mg total) by mouth every evening.    Take 1 tablet (7.5 mg total) by mouth every evening.         Allergies:   Review of patient's allergies indicates:  No  "Known Allergies      Vitals   Vitals:    12/11/23 1304   BP: (!) 145/84   Pulse: 70          Labs/Imaging/Studies:   No results found for this or any previous visit (from the past 48 hour(s)).   No results found for: "PHENYTOIN", "PHENOBARB", "VALPROATE", "CBMZ"    Compliance: yes    Side effects: None    Risk Parameters:  Patient reports no suicidal ideation  Patient reports no homicidal ideation  Patient reports no self-injurious behavior  Patient reports no violent behavior    Exam (detailed: at least 9 elements; comprehensive: all 15 elements)   Constitutional  Vitals:  Most recent vital signs, dated less than 90 days prior to this appointment, were reviewed.   Vitals:    12/11/23 1304   BP: (!) 145/84   Pulse: 70   SpO2: 96%   Weight: 91.2 kg (201 lb 1 oz)   Height: 5' 8" (1.727 m)        General:  unremarkable, age appropriate     Musculoskeletal  Muscle Strength/Tone:  no spasicity, no rigidity, no cogwheeling, no flaccidity, no paratonia, no dyskinesia, no dystonia, no tremor, no tic, no choreoathetosis, no atrophy   Gait & Station:  uses walker     Psychiatric  Speech:  no latency; no press   Mood & Affect:  steady  congruent and appropriate   Thought Process:  normal and logical   Associations:  intact   Thought Content:  normal, no suicidality, no homicidality, delusions, or paranoia   Insight:  intact, has awareness of illness   Judgement: behavior is adequate to circumstances, age appropriate   Orientation:  grossly intact, person, place, situation, time/date   Memory: intact for content of interview, grossly intact, memory >3 objects at five mins, able to remember recent events- Yes, able to remember remote events- Yes   Language: grossly intact, able to name, able to repeat   Attention Span & Concentration:  able to focus, completed tasks   Fund of Knowledge:  intact and appropriate to age and level of education, familiar with aspects of current personal life     Assessment and Diagnosis "   Status/Progress: Based on the examination today, the patient's problem(s) is/are resolving.  New problems have not been presented today.   Co-morbidities are complicating management of the primary condition.       General Impression:      ICD-10-CM ICD-9-CM   1. Insomnia, unspecified type  G47.00 780.52   2. MDD (major depressive disorder), recurrent episode, mild  F33.0 296.31   3. ISHMAEL (generalized anxiety disorder)  F41.1 300.02       Intervention/Counseling/Treatment Plan   Mood   Continue Buspar 15mg TID - targeting anxiety   Continue Cymbalta 20mg daily - targeting anxiety and depression   Increase Remeron 15mg QHS - targeting insomnia          Concern that Pts sleep issues are due to DRU. Pt snores, family has witnessed Pt stop breathing at night, and Pt experiences daytime drowsiness.  Pt has a strong FH of DRU.        Encouraged Pts son to attend next appt.      Discussed diagnosis, risks and benefits of proposed treatment above vs alternative treatments vs no treatment, and potential side effects of these treatments, and the inherent unpredictability of individual response to treatment.  The patient expresses understanding and gives informed consent to pursue treatment.  The potential benefits outweigh the potential risks. Patient has no other questions. Risks/adverse effects discussed at this time including but not limited to: GI side effects, sexual dysfunction, activation vs sedation, triggering of suicidal thoughts, and serotonin syndrome.    Serotonin syndrome   Mental status changes can include anxiety, restlessness, disorientation, and agitated delirium.    Autonomic manifestations can include diaphoresis, tachycardia, hyperthermia, hypertension, vomiting, and diarrhea   Neuromuscular hyperactivity can manifest as tremor, myoclonus, hyperreflexia, rigidity, hyperthermia, seizure, and bilateral Babinski sign.   Pt was informed that if they experience any of these symptoms to go the ED.      Difficulty Sleeping Behavioral Modification:  Implement stimulus control: [place] bedroom for sleep only. Leave bedroom when frustrated from not sleeping. Engage in relaxation before returning. Engage in activities during the day. AVOID >7-8 h time in bed  Avoid clock watching  Avoid thinking/worrying about sleep when trying to fall asleep  Limit caffeinated consumption  Make sure the bedroom is dark, quiet and cool    Safety Plan   Patient voices understanding and agreement with this plan  Provided crisis numbers  Encouraged patient to keep future appointments.  Instructed patient to call or message with questions or concerns  In the event of an emergency, including suicidal ideation, patient was advised to go to the emergency room and/or call 911    Return to Clinic: 6 months    Psychotherapy:  Target symptoms: depression, anxiety   Why chosen therapy is appropriate versus another modality: relevant to diagnosis, evidence based practice  Outcome monitoring methods: self-report, observation  Therapeutic intervention type: insight oriented psychotherapy, interactive psychotherapy  Topics discussed/themes: relationships difficulties, difficulty managing affect in interpersonal relationships, symptom recognition  The patient's response to the intervention is guarded. The patient's progress toward treatment goals is fair.   Duration of intervention: 18 minutes.    Total face to face time: 45 min  Total time (chart review, patient contact, documentation): 63 min  Pts son participated extensively in interview    A diagnostic psychiatric evaluation was performed and responsiveness to treatment was assessed.  The patient demonstrates adequate ability/capacity to respond to treatment.    Ken Franks PA-C    *This note has been prepared using a combination of a dictation device and typing.  It has been checked for errors but some errors may still exist within the note as a result of speech recognition errors and/or  typographical errors.

## 2023-12-12 ENCOUNTER — PATIENT MESSAGE (OUTPATIENT)
Dept: HEMATOLOGY/ONCOLOGY | Facility: CLINIC | Age: 83
End: 2023-12-12
Payer: MEDICARE

## 2023-12-12 NOTE — PROGRESS NOTES
Subjective:     Patient ID: Javid Daniel is a 83 y.o. male with +RF & joint pain    Chief Complaint: No chief complaint on file.       HPI  Followed by Dr. Vernon.  Has lumbar spondylosis & is c/o LBP.  Also c/o hand pain w some R MCP joint swelling.  AM stiffness minimal (minutes).  Also c/o pedal edema.  Has fatigue, insomnia & anxiety.       Current Outpatient Medications   Medication Sig Dispense Refill    acetaminophen (TYLENOL) 325 MG tablet Take 325 mg by mouth every 6 (six) hours as needed for Pain.      amLODIPine (NORVASC) 10 MG tablet Take 1 tablet (10 mg total) by mouth once daily. 90 tablet 3    busPIRone (BUSPAR) 15 MG tablet Take 1 tablet (15 mg total) by mouth 3 (three) times daily. 270 tablet 2    carvediloL (COREG) 12.5 MG tablet Take 1 tablet (12.5 mg total) by mouth 2 (two) times daily with meals. 180 tablet 3    diclofenac sodium (VOLTAREN) 1 % Gel APPLY 3 TO 4 GRAMS ONTO THE AFFECTED AREA OF THE SKIN  g 5    doxazosin (CARDURA) 4 MG tablet Take 1 tablet (4 mg total) by mouth every evening. 90 tablet 3    DULoxetine (CYMBALTA) 20 MG capsule Take 1 capsule (20 mg total) by mouth once daily. 90 capsule 2    fluticasone propionate (FLONASE) 50 mcg/actuation nasal spray SHAKE LIQUID AND USE 2 SPRAYS(100 MCG) IN EACH NOSTRIL EVERY DAY 16 g 5    gabapentin (NEURONTIN) 600 MG tablet Take 1 tablet (600 mg total) by mouth 3 (three) times daily. Take 2 or 3 times a day. 270 tablet 3    HYDROcodone-acetaminophen (NORCO) 5-325 mg per tablet Take 1 tablet by mouth daily as needed.      levocetirizine (XYZAL) 5 MG tablet Take 1 tablet (5 mg total) by mouth every evening. 90 tablet 3    mirtazapine (REMERON) 15 MG tablet Take 1 tablet (15 mg total) by mouth every evening. 90 tablet 2    olmesartan (BENICAR) 40 MG tablet Take 1 tablet (40 mg total) by mouth once daily. 90 tablet 3    pantoprazole (PROTONIX) 40 MG tablet Take 1 tablet (40 mg total) by mouth once daily. 90 tablet 3     No current  "facility-administered medications for this visit.       Review of patient's allergies indicates:  No Known Allergies    Review of Systems    Past Medical History:   Diagnosis Date    Anxiety     GERD (gastroesophageal reflux disease)     Hypertension     Nuclear sclerosis of both eyes 03/08/2021    Primary osteoarthritis of both knees 04/09/2023       History reviewed. No pertinent surgical history.    Family History   Problem Relation Age of Onset    No Known Problems Mother     No Known Problems Father     No Known Problems Sister     No Known Problems Brother     No Known Problems Maternal Aunt     No Known Problems Maternal Uncle     No Known Problems Paternal Aunt     No Known Problems Paternal Uncle     No Known Problems Maternal Grandmother     No Known Problems Maternal Grandfather     No Known Problems Paternal Grandmother     No Known Problems Paternal Grandfather     No Known Problems Other     Amblyopia Neg Hx     Blindness Neg Hx     Cancer Neg Hx     Cataracts Neg Hx     Diabetes Neg Hx     Glaucoma Neg Hx     Hypertension Neg Hx     Macular degeneration Neg Hx     Retinal detachment Neg Hx     Strabismus Neg Hx     Stroke Neg Hx     Thyroid disease Neg Hx     Colon cancer Neg Hx     Esophageal cancer Neg Hx        Social History     Tobacco Use    Smoking status: Never    Smokeless tobacco: Never   Substance Use Topics    Alcohol use: Not Currently    Drug use: Never       Objective:     BP (!) 137/7   Pulse 60   Ht 5' 8" (1.727 m)   Wt 94.5 kg (208 lb 5.4 oz)   BMI 31.68 kg/m²   Accompanied by his son.  Also using Freddie: Emely: [id number]  Physical Exam   Constitutional: He is oriented to person, place, and time. He appears well-developed. No distress.   HENT:   Head: Normocephalic and atraumatic.   Mouth/Throat: Oropharynx is clear and moist. No oropharyngeal exudate.   No parotidomegaly;   Temporal arteries with good pulsations.   No temporal artery tenderness;   No scalp tenderness.  No oral " ulcers;   Eyes: Pupils are equal, round, and reactive to light. Conjunctivae are normal. No scleral icterus.   Neck: No JVD present. No tracheal deviation present. No thyromegaly present.   Cardiovascular: Normal rate and regular rhythm. Exam reveals no gallop and no friction rub.   Murmur heard.  Pulmonary/Chest: Effort normal and breath sounds normal. No respiratory distress. He has no wheezes. He has no rales. He exhibits no tenderness.   Abdominal: Soft. Bowel sounds are normal. He exhibits no distension and no mass. There is no abdominal tenderness. There is no rebound and no guarding.   Musculoskeletal:         General: No tenderness.      Cervical back: Normal range of motion and neck supple.      Right lower leg: Edema present.      Left lower leg: Edema present.      Comments: CSpine FROM  [facility]  Bent at the waist.  Min SHT of 2 & 3rd MCP jts on R.  OA changes rest of hand w squaring of 1st CMCPs & small Heberden's & Bouchards.  Makes adequate fists.  Wrists ok;  No other synovitis.  Bilateral HV; no metatarsalgia.         Lymphadenopathy:     He has no cervical adenopathy.   Neurological: He is alert and oriented to person, place, and time. He has normal reflexes. No cranial nerve deficit.   Motor strength: 5/5 prox & distal.   Skin: Skin is warm and dry. No rash noted.   Psychiatric: Mood, memory and affect normal.   Vitals reviewed.          11/28/23: CMP ok  11/13/22: CBC Hg 11.5; Ht 36.7; plt 136;   6/28/22: JAVON neg; CCP neg RF 18;     6/21/22: MRI: [facility]: Lumbar spondylosis, resulting in severe spinal canal stenosis at L4-5, moderate spinal canal stenosis at L3-4, and moderate/ severe neural foraminal narrowing from L2-3 through L5-S1,   Assessment:   +RF   Min R 2&3 MCP swelling   Doubt RA  Generalized OA  Lumbar spinal stenosis w spondylosis  LBP  Pedal edema   Amlodipine   Venous insufficiency   Salt ingestion   Obesity    Plan:   Discussed the differences between OA & RA.  Handouts provided in  [facility] for OA & LBP  Reassured a +RF does not a dx of RA make.  In an abundance of caution will get some labs & imaging.  Reviewed causes of pedal edema & recommendations made.  He will f/u w PCP  RTC 1 year. Unless w/u says otherwise.        Addendum: 12/14/23: ESR 11; CRP 2.7;

## 2023-12-14 ENCOUNTER — LAB VISIT (OUTPATIENT)
Dept: LAB | Facility: HOSPITAL | Age: 83
End: 2023-12-14
Attending: INTERNAL MEDICINE
Payer: MEDICARE

## 2023-12-14 ENCOUNTER — OFFICE VISIT (OUTPATIENT)
Dept: RHEUMATOLOGY | Facility: CLINIC | Age: 83
End: 2023-12-14
Payer: MEDICARE

## 2023-12-14 VITALS
DIASTOLIC BLOOD PRESSURE: 7 MMHG | WEIGHT: 208.31 LBS | HEIGHT: 68 IN | HEART RATE: 60 BPM | SYSTOLIC BLOOD PRESSURE: 137 MMHG | BODY MASS INDEX: 31.57 KG/M2

## 2023-12-14 DIAGNOSIS — M25.50 PAIN IN JOINT INVOLVING MULTIPLE SITES: ICD-10-CM

## 2023-12-14 DIAGNOSIS — M15.9 GENERALIZED OSTEOARTHRITIS OF MULTIPLE SITES: ICD-10-CM

## 2023-12-14 DIAGNOSIS — R76.8 RHEUMATOID FACTOR POSITIVE: ICD-10-CM

## 2023-12-14 DIAGNOSIS — M54.50 DORSALGIA OF LUMBOSACRAL REGION: ICD-10-CM

## 2023-12-14 DIAGNOSIS — Z55.9 EDUCATIONAL CIRCUMSTANCE: ICD-10-CM

## 2023-12-14 DIAGNOSIS — R60.0 PEDAL EDEMA: ICD-10-CM

## 2023-12-14 DIAGNOSIS — R76.8 RHEUMATOID FACTOR POSITIVE: Primary | ICD-10-CM

## 2023-12-14 LAB
CCP AB SER IA-ACNC: <0.5 U/ML
CRP SERPL-MCNC: 2.7 MG/L (ref 0–8.2)
ERYTHROCYTE [SEDIMENTATION RATE] IN BLOOD BY PHOTOMETRIC METHOD: 11 MM/HR (ref 0–23)

## 2023-12-14 PROCEDURE — 1125F PR PAIN SEVERITY QUANTIFIED, PAIN PRESENT: ICD-10-PCS | Mod: HCNC,CPTII,S$GLB, | Performed by: INTERNAL MEDICINE

## 2023-12-14 PROCEDURE — 1159F PR MEDICATION LIST DOCUMENTED IN MEDICAL RECORD: ICD-10-PCS | Mod: HCNC,CPTII,S$GLB, | Performed by: INTERNAL MEDICINE

## 2023-12-14 PROCEDURE — 99215 OFFICE O/P EST HI 40 MIN: CPT | Mod: HCNC,S$GLB,, | Performed by: INTERNAL MEDICINE

## 2023-12-14 PROCEDURE — 86200 CCP ANTIBODY: CPT | Mod: HCNC | Performed by: INTERNAL MEDICINE

## 2023-12-14 PROCEDURE — 99999 PR PBB SHADOW E&M-EST. PATIENT-LVL V: ICD-10-PCS | Mod: PBBFAC,HCNC,, | Performed by: INTERNAL MEDICINE

## 2023-12-14 PROCEDURE — 1159F MED LIST DOCD IN RCRD: CPT | Mod: HCNC,CPTII,S$GLB, | Performed by: INTERNAL MEDICINE

## 2023-12-14 PROCEDURE — 86140 C-REACTIVE PROTEIN: CPT | Mod: HCNC | Performed by: INTERNAL MEDICINE

## 2023-12-14 PROCEDURE — 3078F DIAST BP <80 MM HG: CPT | Mod: HCNC,CPTII,S$GLB, | Performed by: INTERNAL MEDICINE

## 2023-12-14 PROCEDURE — 36415 COLL VENOUS BLD VENIPUNCTURE: CPT | Mod: HCNC | Performed by: INTERNAL MEDICINE

## 2023-12-14 PROCEDURE — 1160F RVW MEDS BY RX/DR IN RCRD: CPT | Mod: HCNC,CPTII,S$GLB, | Performed by: INTERNAL MEDICINE

## 2023-12-14 PROCEDURE — 99999 PR PBB SHADOW E&M-EST. PATIENT-LVL V: CPT | Mod: PBBFAC,HCNC,, | Performed by: INTERNAL MEDICINE

## 2023-12-14 PROCEDURE — 99215 PR OFFICE/OUTPT VISIT, EST, LEVL V, 40-54 MIN: ICD-10-PCS | Mod: HCNC,S$GLB,, | Performed by: INTERNAL MEDICINE

## 2023-12-14 PROCEDURE — 3075F SYST BP GE 130 - 139MM HG: CPT | Mod: HCNC,CPTII,S$GLB, | Performed by: INTERNAL MEDICINE

## 2023-12-14 PROCEDURE — 85652 RBC SED RATE AUTOMATED: CPT | Mod: HCNC | Performed by: INTERNAL MEDICINE

## 2023-12-14 PROCEDURE — 1160F PR REVIEW ALL MEDS BY PRESCRIBER/CLIN PHARMACIST DOCUMENTED: ICD-10-PCS | Mod: HCNC,CPTII,S$GLB, | Performed by: INTERNAL MEDICINE

## 2023-12-14 PROCEDURE — 3078F PR MOST RECENT DIASTOLIC BLOOD PRESSURE < 80 MM HG: ICD-10-PCS | Mod: HCNC,CPTII,S$GLB, | Performed by: INTERNAL MEDICINE

## 2023-12-14 PROCEDURE — 1125F AMNT PAIN NOTED PAIN PRSNT: CPT | Mod: HCNC,CPTII,S$GLB, | Performed by: INTERNAL MEDICINE

## 2023-12-14 PROCEDURE — 3075F PR MOST RECENT SYSTOLIC BLOOD PRESS GE 130-139MM HG: ICD-10-PCS | Mod: HCNC,CPTII,S$GLB, | Performed by: INTERNAL MEDICINE

## 2023-12-14 SDOH — SOCIAL DETERMINANTS OF HEALTH (SDOH): PROBLEMS RELATED TO EDUCATION AND LITERACY, UNSPECIFIED: Z55.9

## 2023-12-14 ASSESSMENT — ROUTINE ASSESSMENT OF PATIENT INDEX DATA (RAPID3)
PAIN SCORE: 8
MDHAQ FUNCTION SCORE: 0.8
FATIGUE SCORE: 8
PATIENT GLOBAL ASSESSMENT SCORE: 8
TOTAL RAPID3 SCORE: 6.22
AM STIFFNESS SCORE: 1, YES
PSYCHOLOGICAL DISTRESS SCORE: 3.3

## 2023-12-14 NOTE — PATIENT INSTRUCTIONS
Do not take more than 3,000 mg of acetaminophen/24 hrs.  Always consider the acetaminophen from [facility].    Paraffin wax baths may help

## 2023-12-15 RX ORDER — DICLOFENAC SODIUM 10 MG/G
GEL TOPICAL
Qty: 100 G | Refills: 5 | Status: SHIPPED | OUTPATIENT
Start: 2023-12-15 | End: 2023-12-21 | Stop reason: SDUPTHER

## 2023-12-21 ENCOUNTER — OFFICE VISIT (OUTPATIENT)
Dept: PAIN MEDICINE | Facility: CLINIC | Age: 83
End: 2023-12-21
Payer: MEDICARE

## 2023-12-21 VITALS
SYSTOLIC BLOOD PRESSURE: 163 MMHG | DIASTOLIC BLOOD PRESSURE: 76 MMHG | HEART RATE: 60 BPM | HEIGHT: 68 IN | BODY MASS INDEX: 31.57 KG/M2 | WEIGHT: 208.31 LBS

## 2023-12-21 DIAGNOSIS — M48.062 SPINAL STENOSIS OF LUMBAR REGION WITH NEUROGENIC CLAUDICATION: ICD-10-CM

## 2023-12-21 DIAGNOSIS — M17.0 PRIMARY OSTEOARTHRITIS OF BOTH KNEES: Chronic | ICD-10-CM

## 2023-12-21 PROCEDURE — 3078F PR MOST RECENT DIASTOLIC BLOOD PRESSURE < 80 MM HG: ICD-10-PCS | Mod: CPTII,S$GLB,, | Performed by: STUDENT IN AN ORGANIZED HEALTH CARE EDUCATION/TRAINING PROGRAM

## 2023-12-21 PROCEDURE — 99999 PR PBB SHADOW E&M-EST. PATIENT-LVL III: CPT | Mod: PBBFAC,,, | Performed by: STUDENT IN AN ORGANIZED HEALTH CARE EDUCATION/TRAINING PROGRAM

## 2023-12-21 PROCEDURE — 3078F DIAST BP <80 MM HG: CPT | Mod: CPTII,S$GLB,, | Performed by: STUDENT IN AN ORGANIZED HEALTH CARE EDUCATION/TRAINING PROGRAM

## 2023-12-21 PROCEDURE — 3077F PR MOST RECENT SYSTOLIC BLOOD PRESSURE >= 140 MM HG: ICD-10-PCS | Mod: CPTII,S$GLB,, | Performed by: STUDENT IN AN ORGANIZED HEALTH CARE EDUCATION/TRAINING PROGRAM

## 2023-12-21 PROCEDURE — 3288F FALL RISK ASSESSMENT DOCD: CPT | Mod: CPTII,S$GLB,, | Performed by: STUDENT IN AN ORGANIZED HEALTH CARE EDUCATION/TRAINING PROGRAM

## 2023-12-21 PROCEDURE — 3077F SYST BP >= 140 MM HG: CPT | Mod: CPTII,S$GLB,, | Performed by: STUDENT IN AN ORGANIZED HEALTH CARE EDUCATION/TRAINING PROGRAM

## 2023-12-21 PROCEDURE — 1125F AMNT PAIN NOTED PAIN PRSNT: CPT | Mod: CPTII,S$GLB,, | Performed by: STUDENT IN AN ORGANIZED HEALTH CARE EDUCATION/TRAINING PROGRAM

## 2023-12-21 PROCEDURE — 1159F PR MEDICATION LIST DOCUMENTED IN MEDICAL RECORD: ICD-10-PCS | Mod: CPTII,S$GLB,, | Performed by: STUDENT IN AN ORGANIZED HEALTH CARE EDUCATION/TRAINING PROGRAM

## 2023-12-21 PROCEDURE — 1101F PT FALLS ASSESS-DOCD LE1/YR: CPT | Mod: CPTII,S$GLB,, | Performed by: STUDENT IN AN ORGANIZED HEALTH CARE EDUCATION/TRAINING PROGRAM

## 2023-12-21 PROCEDURE — 1159F MED LIST DOCD IN RCRD: CPT | Mod: CPTII,S$GLB,, | Performed by: STUDENT IN AN ORGANIZED HEALTH CARE EDUCATION/TRAINING PROGRAM

## 2023-12-21 PROCEDURE — 1101F PR PT FALLS ASSESS DOC 0-1 FALLS W/OUT INJ PAST YR: ICD-10-PCS | Mod: CPTII,S$GLB,, | Performed by: STUDENT IN AN ORGANIZED HEALTH CARE EDUCATION/TRAINING PROGRAM

## 2023-12-21 PROCEDURE — 99214 OFFICE O/P EST MOD 30 MIN: CPT | Mod: S$GLB,,, | Performed by: STUDENT IN AN ORGANIZED HEALTH CARE EDUCATION/TRAINING PROGRAM

## 2023-12-21 PROCEDURE — 99999 PR PBB SHADOW E&M-EST. PATIENT-LVL III: ICD-10-PCS | Mod: PBBFAC,,, | Performed by: STUDENT IN AN ORGANIZED HEALTH CARE EDUCATION/TRAINING PROGRAM

## 2023-12-21 PROCEDURE — 99214 PR OFFICE/OUTPT VISIT, EST, LEVL IV, 30-39 MIN: ICD-10-PCS | Mod: S$GLB,,, | Performed by: STUDENT IN AN ORGANIZED HEALTH CARE EDUCATION/TRAINING PROGRAM

## 2023-12-21 PROCEDURE — 1125F PR PAIN SEVERITY QUANTIFIED, PAIN PRESENT: ICD-10-PCS | Mod: CPTII,S$GLB,, | Performed by: STUDENT IN AN ORGANIZED HEALTH CARE EDUCATION/TRAINING PROGRAM

## 2023-12-21 PROCEDURE — 3288F PR FALLS RISK ASSESSMENT DOCUMENTED: ICD-10-PCS | Mod: CPTII,S$GLB,, | Performed by: STUDENT IN AN ORGANIZED HEALTH CARE EDUCATION/TRAINING PROGRAM

## 2023-12-21 RX ORDER — DICLOFENAC SODIUM 10 MG/G
GEL TOPICAL
Qty: 100 G | Refills: 12 | Status: SHIPPED | OUTPATIENT
Start: 2023-12-21

## 2023-12-21 RX ORDER — GABAPENTIN 600 MG/1
600 TABLET ORAL 4 TIMES DAILY
Qty: 360 TABLET | Refills: 3 | Status: SHIPPED | OUTPATIENT
Start: 2023-12-21 | End: 2024-12-15

## 2023-12-21 NOTE — PROGRESS NOTES
Chronic Pain - f/u    Referring Physician: No ref. provider found    Date: 12/21/2023     Re: Javid Daniel  MR#: [id number]  YOB: 1940  Age: 83 y.o.    Chief Complaint:   Chief Complaint   Patient presents with    Low-back Pain     **This note is dictated using the M*Modal Fluency Direct word recognition program. There are word recognition mistakes that are occasionally missed on review.**    ASSESSMENT: 83 y.o. year old male with left sided back and leg pain, consistent with     1. Spinal stenosis of lumbar region with neurogenic claudication  gabapentin (NEURONTIN) 600 MG tablet      2. Primary osteoarthritis of both knees  diclofenac sodium (VOLTAREN) 1 % Gel        PLAN:     Lumbar spinal stenosis with radiculopathy, neurogenic claudication and DDD  -Okay to continue tylenol 1000mg BID-TID  -healthy back referral  -he is on PRN hydrocodone 5mg  -continue PRN back brace  -epidural injections not recommended at this time given then were not overly helpful in the past and he is being worked up for pancytopenia  -Will defer surgical referral until pancytopenia workup complete. This is probably not indicated at this time.  -increase gabapentin to [id number]  -refill diclofenac gel  -MRI reviewed with the patient and his son.    Pancytopenia  -workup ongoing  -patient's son states the patient was recommended to stop opioids and NSAIDs due to pancytopenia.    Vertebrogenic LBP  -discussed that he could consider Intracept. But not indicated at this time.    - RTC PRN  - Counseled patient regarding the importance of weight loss and activity modification and physical therapy.    The above plan and management options were discussed at length with patient. Patient is in agreement with the above and verbalized understanding. It will be communicated with the referring physician via electronic record, fax, or mail.  Lab/study reports reviewed were important and necessary because subsequent medical  and treatment recommendations required review of the above lab/study reports. Images viewed/reviewed above were important and necessary because subsequent medical and treatment recommendations required review of the reviewed image(s).     Electronically signed by:  Edouard Dallas DO  12/21/2023    =========================================================================================================    Nepali speaking  was present. Son also assisted with translation.    SUBJECTIVE:    Interval History 12/21/2023:   Javid Daniel is a 83 y.o. male presents to the clinic for follow up.  Since last visit the pain has is unchanged. Had biopsy in the back which was a bit helpful. The pain is worse with bending/lifting. The pain is not worse with sitting, walking. Otherwise he does not have much pain.  When he bends to pick something up the pain with hurt for 3-4 minutes.    The pain is located in the lower back area and does not radiate.  The pain is described as aching    At BEST  5/10   At WORST  8/10 on the WORST day.    On average pain is rated as 8/10.   Today the pain is rated as 3/10  Symptoms interfere with daily activity.   Exacerbating factors: Sitting, Standing, Laying, Bending, Touching, Walking, Night Time, Morning, Extension, Flexing, Lifting, and Getting out of bed/chair.    Mitigating factors medications.     Current pain medications: Hydrocodone 5mg (3x/week), Tylenol BID, gabapentin 300mg TID, Duloxetine 20mg QD  Failed Pain Medications: diclofenac 50mg. Cannot take NSAIDs due to pancytopenia, Hydrocodone 5mg (stopped)    Initial hx:  Javid Daniel is a 83 y.o. male presents to the clinic for the evaluation of  Lower Back Pain pain. The pain started 6 years ago following no inciting event and symptoms have been worsening. The pain is located in the left side low back and radiates to the left leg.  When it radiates down the left leg it does not go below the knee. Most  of the time the pain stays in the back.  Has been ongoing for years but worse recently.  It is worse with bending forward and lifting.  Sitting helps. Laying down makes it feel better.  When he takes his medicine, he can walk and he is fine.  If he does not take his medicine then the pain is bad.     He is seeing Dr. Arrington in orthopedics. He gets cortisone injections from Dr. Arrington.  He has gotten epidural injections in the past but not recently.    He is currently being worked up for pancytopenia by Hematology.    Pain Description:    The pain is located in the Lower back  area and radiates to the left leg .    At BEST  6/10   At WORST  8/10 on the WORST day.    On average pain is rated as 6/10.   Today the pain is rated as 9/10  The pain is intermittent.  The pain is described as aching    Symptoms interfere with daily activity.   Exacerbating factors: Sitting and Standing.    Mitigating factors medications.   He reports 3   hours of sleep per night.    Physical Therapy/Home Exercise:  completed physical therapy    Current Pain Medications:    - Hydrocodone 5mg (stopped), Tylenol BID, gabapentin 300mg TID, Duloxetine 20mg QD    Failed Pain Medications:    - diclofenac 50mg. Cannot take NSAIDs due to pancytopenia, Hydrocodone 5mg (stopped)    Pain Treatment Therapies:    Pain procedures:   Epidural injections with Dr. Arrington - helps but doesn't last very long. A couple weeks only.  Physical Therapy: last PT for back was 3-4 years ago  Chiropractor: none  Acupuncture: none  TENS unit: none  Spinal decompression: none  Joint replacement: none    Patient denies urinary incontinence and bowel incontinence.  Patient denies any suicidal or homicidal ideations     report:  Reviewed and consistent with medication use as prescribed.    Imaging:   MRI lumbar 06/2022:  FINDINGS:  The distal cord/conus demonstrates normal size and signal.  No evidence osteomyelitis, marrow replacement process, or acute fracture.  Bilateral  renal cysts, noting limited assessment without IV contrast.     At L2-3, there is moderate disc bulging and facet joint arthropathy, resulting in mild spinal canal stenosis and moderate left and mild right-sided neural foraminal narrowing.     At L3-4, there is moderate disc bulging and facet joint arthropathy, resulting in moderate spinal canal stenosis and moderate bilateral neural foraminal narrowing.     At L5-S1, there is prominent disc bulging and advanced facet joint arthropathy, resulting in severe spinal canal stenosis and moderate/severe bilateral neural foraminal narrowing.     At L5-S1, there is moderate disc bulging and facet joint arthropathy, resulting in mild spinal canal stenosis.  There is advanced right-sided degenerative disc disease, noting marked disc height loss with sub endplate marrow changes.  This results in severe right and moderate left-sided neural foraminal narrowing.     Impression:     Lumbar spondylosis, resulting in severe spinal canal stenosis at L4-5, moderate spinal canal stenosis at L3-4, and moderate/ severe neural foraminal narrowing from L2-3 through L5-S1, as above.    Past Medical History:   Diagnosis Date    Anxiety     GERD (gastroesophageal reflux disease)     Hypertension     Nuclear sclerosis of both eyes 03/08/2021    Primary osteoarthritis of both knees 04/09/2023     No past surgical history on file.  Social History     Socioeconomic History    Marital status:    Tobacco Use    Smoking status: Never    Smokeless tobacco: Never   Substance and Sexual Activity    Alcohol use: Not Currently    Drug use: Never    Sexual activity: Yes     Social Determinants of Health     Financial Resource Strain: High Risk (8/10/2023)    Overall Financial Resource Strain (CARDIA)     Difficulty of Paying Living Expenses: Very hard   Food Insecurity: Food Insecurity Present (8/10/2023)    Hunger Vital Sign     Worried About Running Out of Food in the Last Year: Sometimes true      Ran Out of Food in the Last Year: Sometimes true   Transportation Needs: No Transportation Needs (8/10/2023)    PRAPARE - Transportation     Lack of Transportation (Medical): No     Lack of Transportation (Non-Medical): No   Physical Activity: Insufficiently Active (8/10/2023)    Exercise Vital Sign     Days of Exercise per Week: 4 days     Minutes of Exercise per Session: 10 min   Stress: Stress Concern Present (8/10/2023)    [employer] - Occupational Stress Questionnaire     Feeling of Stress : To some extent   Social Connections: Unknown (8/10/2023)    Social Connection and Isolation Panel [NHANES]     Attends Sikhism Services: Never     Active Member of Clubs or Organizations: No     Attends Club or Organization Meetings: Never     Marital Status:    Housing Stability: High Risk (8/10/2023)    Housing Stability Vital Sign     Unable to Pay for Housing in the Last Year: Yes     Number of Places Lived in the Last Year: 1     Unstable Housing in the Last Year: No     Family History   Problem Relation Age of Onset    No Known Problems Mother     No Known Problems Father     No Known Problems Sister     No Known Problems Brother     No Known Problems Maternal Aunt     No Known Problems Maternal Uncle     No Known Problems Paternal Aunt     No Known Problems Paternal Uncle     No Known Problems Maternal Grandmother     No Known Problems Maternal Grandfather     No Known Problems Paternal Grandmother     No Known Problems Paternal Grandfather     No Known Problems Other     Amblyopia Neg Hx     Blindness Neg Hx     Cancer Neg Hx     Cataracts Neg Hx     Diabetes Neg Hx     Glaucoma Neg Hx     Hypertension Neg Hx     Macular degeneration Neg Hx     Retinal detachment Neg Hx     Strabismus Neg Hx     Stroke Neg Hx     Thyroid disease Neg Hx     Colon cancer Neg Hx     Esophageal cancer Neg Hx        Review of patient's allergies indicates:  No Known Allergies    Current Outpatient  Medications   Medication Sig    acetaminophen (TYLENOL) 325 MG tablet Take 325 mg by mouth every 6 (six) hours as needed for Pain.    amLODIPine (NORVASC) 10 MG tablet Take 1 tablet (10 mg total) by mouth once daily.    busPIRone (BUSPAR) 15 MG tablet Take 1 tablet (15 mg total) by mouth 3 (three) times daily.    carvediloL (COREG) 12.5 MG tablet Take 1 tablet (12.5 mg total) by mouth 2 (two) times daily with meals.    doxazosin (CARDURA) 4 MG tablet Take 1 tablet (4 mg total) by mouth every evening.    DULoxetine (CYMBALTA) 20 MG capsule Take 1 capsule (20 mg total) by mouth once daily.    fluticasone propionate (FLONASE) 50 mcg/actuation nasal spray SHAKE LIQUID AND USE 2 SPRAYS(100 MCG) IN EACH NOSTRIL EVERY DAY    HYDROcodone-acetaminophen (NORCO) 5-325 mg per tablet Take 1 tablet by mouth daily as needed.    levocetirizine (XYZAL) 5 MG tablet Take 1 tablet (5 mg total) by mouth every evening.    mirtazapine (REMERON) 15 MG tablet Take 1 tablet (15 mg total) by mouth every evening.    olmesartan (BENICAR) 40 MG tablet Take 1 tablet (40 mg total) by mouth once daily.    pantoprazole (PROTONIX) 40 MG tablet Take 1 tablet (40 mg total) by mouth once daily.    diclofenac sodium (VOLTAREN) 1 % Gel APPLY 3 TO 4 GRAMS TO THE AFFECTED AREA OF THE SKIN TWICE DAILY    gabapentin (NEURONTIN) 600 MG tablet Take 1 tablet (600 mg total) by mouth 4 (four) times daily. 1 in the morning, 1 in the afternoon, 2 in the evening     No current facility-administered medications for this visit.       REVIEW OF SYSTEMS:    GENERAL:  No weight loss, malaise or fevers.  HEENT:   No recent changes in vision or hearing  NECK:  Negative for lumps, no difficulty with swallowing.  RESPIRATORY:  Negative for cough, wheezing or shortness of breath, patient denies any recent URI.  CARDIOVASCULAR:  Negative for chest pain, leg swelling or palpitations.  GI:  Negative for abdominal discomfort, blood in stools or black stools or change in bowel  "habits.  MUSCULOSKELETAL:  See HPI.  SKIN:  Negative for lesions, rash, and itching.  PSYCH:  No mood disorder or recent psychosocial stressors.  Patients sleep is not disturbed secondary to pain.  HEMATOLOGY/LYMPHOLOGY:  Negative for prolonged bleeding, bruising easily or swollen nodes.  Patient is not currently taking any anti-coagulants  NEURO:   No history of headaches, syncope, paralysis, seizures or tremors.  All other reviewed and negative other than HPI.    OBJECTIVE:    BP (!) 163/76 (BP Location: Right arm, Patient Position: Sitting)   Pulse 60   Ht 5' 8" (1.727 m)   Wt 94.5 kg (208 lb 5.4 oz)   BMI 31.68 kg/m²     PHYSICAL EXAMINATION:    GENERAL: Well appearing, in no acute distress, alert and oriented x3.  PSYCH:  Mood and affect appropriate.  SKIN: Skin color, texture, turgor normal, no rashes or lesions.  HEAD/FACE:  Normocephalic, atraumatic. Cranial nerves grossly intact.  CV: RRR with palpation of the radial artery.  PULM: CTAB. No evidence of respiratory difficulty, symmetric chest rise.  GI:  Soft and non-tender.    BACK:   - No obvious deformity or signs of trauma, Normal lumbar lordotic curve  - Negative spinous process tenderness  - Negative paravertebral tenderness  - Negative pain to palpation over the facet joints of the lumbar spine.   - Slump test is Negative for radicular pain  - Slump test is Negative for back pain  - Supine Straight leg raising is Negative for radicular pain  - Supine Straight leg raising is Negative for back pain  - Lumbar ROM is diminished in Flexion with pain  - Lumbar ROM is normal in Extension without pain  - Lumbar ROM is diminished in Lateral Flexion without pain  - Positive Sustained Hip Flexion test (for discogenic pain)  - Positive Altered Gait, Posture  - Axial facet loading test Negative on the bilateral side(s)    SI Joint exam:  - Negative SI joint tenderness to palpation  - Anant's sign Negative  - Yeoman's Test: Did not perform for SI joint pain " indicating anterior SI ligament involvement. Did not perform for anterior thigh pain/paresthesia which indicates femoral nerve stretch.  - Gaenslen's Test:Negative  - Finger Abraham's Sign:Negative  - SI compression test:Did not perform  - SI distraction test:Negative  - Thigh Thrust: Negative  - SI Thrust: Did not perform    MUSKULOSKELETAL:    EXTREMITIES:   Hip Exam:  - Log Roll Negative  - FADIR Negative  - Stinchfield Did not perform  - Hip Scour Negative  - GTB Tenderness Negative      MUSCULOSKELETAL:  No atrophy or tone abnormalities are noted in the UE or LE.  No deformities, edema, or skin discoloration are noted on visible skin. Good capillary refill.    NEURO: Bilateral upper and lower extremity coordination and muscle stretch reflexes are physiologic and symmetric.      NEUROLOGICAL EXAM:  MENTAL STATUS: A x O x 3, good concentration, speech is fluent and goal directed  MEMORY: recent and remote are intact  CN: CN2-12 grossly intact  MOTOR: 5/5 in all muscle groups of the LE  DTRs: 3+ intact symmetric  Sensation:    -no Loss of sensation in a left lower and right lower L-1, L-2, L-3, L-4, and L-5 bilaterally distribution.  Babinski: absent on the bilateral side(s)  Clonus: absent on the bilateral side(s)

## 2024-01-02 ENCOUNTER — OFFICE VISIT (OUTPATIENT)
Dept: HEMATOLOGY/ONCOLOGY | Facility: CLINIC | Age: 84
End: 2024-01-02
Payer: MEDICARE

## 2024-01-02 ENCOUNTER — TELEPHONE (OUTPATIENT)
Dept: HEMATOLOGY/ONCOLOGY | Facility: CLINIC | Age: 84
End: 2024-01-02

## 2024-01-02 ENCOUNTER — LAB VISIT (OUTPATIENT)
Dept: LAB | Facility: HOSPITAL | Age: 84
End: 2024-01-02
Attending: STUDENT IN AN ORGANIZED HEALTH CARE EDUCATION/TRAINING PROGRAM
Payer: MEDICARE

## 2024-01-02 VITALS
DIASTOLIC BLOOD PRESSURE: 75 MMHG | BODY MASS INDEX: 30.8 KG/M2 | SYSTOLIC BLOOD PRESSURE: 128 MMHG | OXYGEN SATURATION: 98 % | HEIGHT: 68 IN | HEART RATE: 76 BPM | WEIGHT: 203.25 LBS

## 2024-01-02 DIAGNOSIS — N40.1 BENIGN PROSTATIC HYPERPLASIA WITH NOCTURIA: ICD-10-CM

## 2024-01-02 DIAGNOSIS — F10.11 HISTORY OF ETOH ABUSE: ICD-10-CM

## 2024-01-02 DIAGNOSIS — R35.1 BENIGN PROSTATIC HYPERPLASIA WITH NOCTURIA: ICD-10-CM

## 2024-01-02 DIAGNOSIS — D64.9 ANEMIA, UNSPECIFIED TYPE: ICD-10-CM

## 2024-01-02 DIAGNOSIS — D69.6 THROMBOCYTOPENIA: ICD-10-CM

## 2024-01-02 DIAGNOSIS — Z71.2 ENCOUNTER TO DISCUSS TEST RESULTS: ICD-10-CM

## 2024-01-02 DIAGNOSIS — M47.816 LUMBAR SPONDYLOSIS: ICD-10-CM

## 2024-01-02 DIAGNOSIS — Z09 FOLLOW-UP EXAM: ICD-10-CM

## 2024-01-02 DIAGNOSIS — I10 HYPERTENSION, BENIGN: ICD-10-CM

## 2024-01-02 DIAGNOSIS — F10.11 HISTORY OF ETOH ABUSE: Primary | ICD-10-CM

## 2024-01-02 DIAGNOSIS — E11.9 TYPE 2 DIABETES MELLITUS WITHOUT COMPLICATION, UNSPECIFIED WHETHER LONG TERM INSULIN USE: ICD-10-CM

## 2024-01-02 LAB
ALBUMIN SERPL BCP-MCNC: 3.8 G/DL (ref 3.5–5.2)
ALP SERPL-CCNC: 65 U/L (ref 55–135)
ALT SERPL W/O P-5'-P-CCNC: 12 U/L (ref 10–44)
ANION GAP SERPL CALC-SCNC: 6 MMOL/L (ref 8–16)
AST SERPL-CCNC: 17 U/L (ref 10–40)
BASOPHILS # BLD AUTO: 0.02 K/UL (ref 0–0.2)
BASOPHILS NFR BLD: 0.4 % (ref 0–1.9)
BILIRUB SERPL-MCNC: 0.7 MG/DL (ref 0.1–1)
BUN SERPL-MCNC: 21 MG/DL (ref 8–23)
CALCIUM SERPL-MCNC: 9.3 MG/DL (ref 8.7–10.5)
CHLORIDE SERPL-SCNC: 104 MMOL/L (ref 95–110)
CO2 SERPL-SCNC: 28 MMOL/L (ref 23–29)
CREAT SERPL-MCNC: 0.9 MG/DL (ref 0.5–1.4)
DIFFERENTIAL METHOD BLD: ABNORMAL
EOSINOPHIL # BLD AUTO: 0.2 K/UL (ref 0–0.5)
EOSINOPHIL NFR BLD: 2.9 % (ref 0–8)
ERYTHROCYTE [DISTWIDTH] IN BLOOD BY AUTOMATED COUNT: 13.4 % (ref 11.5–14.5)
EST. GFR  (NO RACE VARIABLE): >60 ML/MIN/1.73 M^2
GLUCOSE SERPL-MCNC: 98 MG/DL (ref 70–110)
HCT VFR BLD AUTO: 39 % (ref 40–54)
HGB BLD-MCNC: 12.2 G/DL (ref 14–18)
IMM GRANULOCYTES # BLD AUTO: 0.01 K/UL (ref 0–0.04)
IMM GRANULOCYTES NFR BLD AUTO: 0.2 % (ref 0–0.5)
LYMPHOCYTES # BLD AUTO: 1.5 K/UL (ref 1–4.8)
LYMPHOCYTES NFR BLD: 29.5 % (ref 18–48)
MCH RBC QN AUTO: 30 PG (ref 27–31)
MCHC RBC AUTO-ENTMCNC: 31.3 G/DL (ref 32–36)
MCV RBC AUTO: 96 FL (ref 82–98)
MONOCYTES # BLD AUTO: 0.5 K/UL (ref 0.3–1)
MONOCYTES NFR BLD: 9.2 % (ref 4–15)
NEUTROPHILS # BLD AUTO: 3 K/UL (ref 1.8–7.7)
NEUTROPHILS NFR BLD: 57.8 % (ref 38–73)
NRBC BLD-RTO: 0 /100 WBC
PLATELET # BLD AUTO: 147 K/UL (ref 150–450)
PMV BLD AUTO: 11.8 FL (ref 9.2–12.9)
POTASSIUM SERPL-SCNC: 5 MMOL/L (ref 3.5–5.1)
PROT SERPL-MCNC: 6.8 G/DL (ref 6–8.4)
RBC # BLD AUTO: 4.06 M/UL (ref 4.6–6.2)
SODIUM SERPL-SCNC: 138 MMOL/L (ref 136–145)
WBC # BLD AUTO: 5.11 K/UL (ref 3.9–12.7)

## 2024-01-02 PROCEDURE — 99214 OFFICE O/P EST MOD 30 MIN: CPT | Mod: HCNC,S$GLB,, | Performed by: STUDENT IN AN ORGANIZED HEALTH CARE EDUCATION/TRAINING PROGRAM

## 2024-01-02 PROCEDURE — 1101F PT FALLS ASSESS-DOCD LE1/YR: CPT | Mod: HCNC,CPTII,S$GLB, | Performed by: STUDENT IN AN ORGANIZED HEALTH CARE EDUCATION/TRAINING PROGRAM

## 2024-01-02 PROCEDURE — 3078F DIAST BP <80 MM HG: CPT | Mod: HCNC,CPTII,S$GLB, | Performed by: STUDENT IN AN ORGANIZED HEALTH CARE EDUCATION/TRAINING PROGRAM

## 2024-01-02 PROCEDURE — 80053 COMPREHEN METABOLIC PANEL: CPT | Mod: HCNC | Performed by: STUDENT IN AN ORGANIZED HEALTH CARE EDUCATION/TRAINING PROGRAM

## 2024-01-02 PROCEDURE — 1159F MED LIST DOCD IN RCRD: CPT | Mod: HCNC,CPTII,S$GLB, | Performed by: STUDENT IN AN ORGANIZED HEALTH CARE EDUCATION/TRAINING PROGRAM

## 2024-01-02 PROCEDURE — 3288F FALL RISK ASSESSMENT DOCD: CPT | Mod: HCNC,CPTII,S$GLB, | Performed by: STUDENT IN AN ORGANIZED HEALTH CARE EDUCATION/TRAINING PROGRAM

## 2024-01-02 PROCEDURE — 36415 COLL VENOUS BLD VENIPUNCTURE: CPT | Mod: HCNC | Performed by: STUDENT IN AN ORGANIZED HEALTH CARE EDUCATION/TRAINING PROGRAM

## 2024-01-02 PROCEDURE — 1126F AMNT PAIN NOTED NONE PRSNT: CPT | Mod: HCNC,CPTII,S$GLB, | Performed by: STUDENT IN AN ORGANIZED HEALTH CARE EDUCATION/TRAINING PROGRAM

## 2024-01-02 PROCEDURE — 85025 COMPLETE CBC W/AUTO DIFF WBC: CPT | Mod: HCNC | Performed by: STUDENT IN AN ORGANIZED HEALTH CARE EDUCATION/TRAINING PROGRAM

## 2024-01-02 PROCEDURE — 99999 PR PBB SHADOW E&M-EST. PATIENT-LVL IV: CPT | Mod: PBBFAC,HCNC,, | Performed by: STUDENT IN AN ORGANIZED HEALTH CARE EDUCATION/TRAINING PROGRAM

## 2024-01-02 PROCEDURE — 3074F SYST BP LT 130 MM HG: CPT | Mod: HCNC,CPTII,S$GLB, | Performed by: STUDENT IN AN ORGANIZED HEALTH CARE EDUCATION/TRAINING PROGRAM

## 2024-01-02 NOTE — Clinical Note
-Please get CBC and CMP on way out -Please schedule liver US in next 1-2 weeks -RTC in 6 months for MD visit and repeat labs CMP, CBC day prior

## 2024-01-02 NOTE — TELEPHONE ENCOUNTER
Unable to reach daughter, left VM. Called patient and informed him of below message.       ----- Message from Henrique Patton MD sent at 1/2/2024 11:40 AM CST -----  Please let daughter know that his labs look good and we will see him in 6 months  ----- Message -----  From: [facility] Interface  Sent: 1/2/2024  11:16 AM CST  To: Henrique Patton MD

## 2024-01-02 NOTE — PROGRESS NOTES
Hematology- Oncology Clinic Note :     1/2/2024    Chief Complaint   Patient presents with    Anemia    Follow-up    Results     Pt refused  services preferring son to translate     HPI  Pt is a 83 y.o. male who  has a past medical history of Anxiety, GERD (gastroesophageal reflux disease), Hypertension, Nuclear sclerosis of both eyes (03/08/2021), and Primary osteoarthritis of both knees (04/09/2023). Who presents as a referral for pancytopenia.  Pt's only complaint at time of exam is chronic joint pain.        Interval hx:    Bmbx results reviewed with pt.  No dysplastic cells but bone marrow hypercellular for age.  Can continue close monitoring for developing MDS although no dysplatic cells seen at this time.  Pt and daughter agree to continue surveillance but due to episodes of jaundice since last visit will repeat labs today and get liver us.    Active Problem List with Overview Notes    Diagnosis Date Noted    Pancytopenia 12/05/2023    Anxiety 09/24/2023    Insomnia 04/09/2023    Normocytic anemia 04/09/2023    Thrombocytopenia 04/09/2023    Primary osteoarthritis of both knees 04/09/2023    Lumbar spinal stenosis 04/09/2023    Lumbar spondylosis 04/09/2023    Senile purpura 11/22/2022    Episode of recurrent major depressive disorder 11/22/2022    Generalized anxiety disorder 11/22/2022    Hypertension, benign 11/22/2022     BP Readings from Last 3 Encounters:   09/20/23 128/60   08/10/23 132/62   06/13/23 (!) 153/85      ACC/AHA guidelines on blood pressure goals reviewed.  Reinforced correct way of measuring blood pressure.       Refractive error 03/08/2021    Nuclear sclerosis of both eyes 03/08/2021    Benign prostatic hyperplasia with nocturia 12/06/2019    Weakness of trunk musculature 11/27/2017    Poor flexibility of tendon 11/27/2017    Decreased range of motion of hip 11/27/2017    Weakness of both lower extremities 11/27/2017       Patient Active Problem List    Diagnosis Date Noted     Pancytopenia 12/05/2023    Anxiety 09/24/2023    Insomnia 04/09/2023    Normocytic anemia 04/09/2023    Thrombocytopenia 04/09/2023    Primary osteoarthritis of both knees 04/09/2023    Lumbar spinal stenosis 04/09/2023    Lumbar spondylosis 04/09/2023    Senile purpura 11/22/2022    Episode of recurrent major depressive disorder 11/22/2022    Generalized anxiety disorder 11/22/2022    Hypertension, benign 11/22/2022    Refractive error 03/08/2021    Nuclear sclerosis of both eyes 03/08/2021    Benign prostatic hyperplasia with nocturia 12/06/2019    Weakness of trunk musculature 11/27/2017    Poor flexibility of tendon 11/27/2017    Decreased range of motion of hip 11/27/2017    Weakness of both lower extremities 11/27/2017     Past Medical History:   Diagnosis Date    Anxiety     GERD (gastroesophageal reflux disease)     Hypertension     Nuclear sclerosis of both eyes 03/08/2021    Primary osteoarthritis of both knees 04/09/2023      No past surgical history on file.   (Not in a hospital admission)    Review of patient's allergies indicates:  No Known Allergies   Social History     Tobacco Use    Smoking status: Never    Smokeless tobacco: Never   Substance Use Topics    Alcohol use: Not Currently      Family History   Problem Relation Age of Onset    No Known Problems Mother     No Known Problems Father     No Known Problems Sister     No Known Problems Brother     No Known Problems Maternal Aunt     No Known Problems Maternal Uncle     No Known Problems Paternal Aunt     No Known Problems Paternal Uncle     No Known Problems Maternal Grandmother     No Known Problems Maternal Grandfather     No Known Problems Paternal Grandmother     No Known Problems Paternal Grandfather     No Known Problems Other     Amblyopia Neg Hx     Blindness Neg Hx     Cancer Neg Hx     Cataracts Neg Hx     Diabetes Neg Hx     Glaucoma Neg Hx     Hypertension Neg Hx     Macular degeneration Neg Hx     Retinal detachment Neg Hx      Strabismus Neg Hx     Stroke Neg Hx     Thyroid disease Neg Hx     Colon cancer Neg Hx     Esophageal cancer Neg Hx         Review of Systems :  Review of Systems   Constitutional:  Negative for fever, malaise/fatigue and weight loss.   HENT:  Negative for congestion and hearing loss.    Eyes:  Negative for blurred vision and pain.   Respiratory:  Negative for cough, sputum production and shortness of breath.    Cardiovascular:  Negative for chest pain, palpitations and claudication.   Gastrointestinal:  Negative for abdominal pain, blood in stool, constipation, diarrhea, heartburn, nausea and vomiting.   Genitourinary:  Negative for dysuria and hematuria.   Musculoskeletal:  Positive for back pain, joint pain and myalgias. Negative for falls and neck pain.   Skin:  Negative for itching and rash.   Neurological:  Negative for dizziness, focal weakness and weakness.   Endo/Heme/Allergies:  Does not bruise/bleed easily.   Psychiatric/Behavioral:  Negative for depression. The patient is not nervous/anxious.        Physical Exam :  Wt Readings from Last 3 Encounters:   12/21/23 94.5 kg (208 lb 5.4 oz)   12/14/23 94.5 kg (208 lb 5.4 oz)   11/28/23 92 kg (202 lb 13.2 oz)     Temp Readings from Last 3 Encounters:   12/05/23 98.2 °F (36.8 °C)   11/28/23 97 °F (36.1 °C) (Oral)   09/20/23 98.6 °F (37 °C) (Oral)     BP Readings from Last 3 Encounters:   12/21/23 (!) 163/76   12/14/23 (!) 137/7   12/05/23 109/72     Pulse Readings from Last 3 Encounters:   12/21/23 60   12/14/23 60   12/05/23 89     There is no height or weight on file to calculate BMI.    Physical Exam  Vitals reviewed.   Constitutional:       Appearance: Normal appearance.      Comments: Uses walker   HENT:      Head: Normocephalic and atraumatic.      Nose: Nose normal.      Mouth/Throat:      Mouth: Mucous membranes are moist.   Eyes:      Pupils: Pupils are equal, round, and reactive to light.   Cardiovascular:      Rate and Rhythm: Normal rate.      Heart  sounds: Normal heart sounds.   Pulmonary:      Effort: Pulmonary effort is normal.      Breath sounds: Normal breath sounds.   Abdominal:      General: Abdomen is flat.   Musculoskeletal:      Cervical back: Neck supple.      Right lower leg: No edema.      Left lower leg: No edema.   Skin:     General: Skin is warm and dry.   Neurological:      Mental Status: He is alert. Mental status is at baseline.   Psychiatric:         Mood and Affect: Mood normal.         Behavior: Behavior normal.           Pertinent Diagnostic studies:      Bone marrow, left iliac crest, aspirate, clot, and core biopsy:  --Hypercellular marrow for age, estimated at 40-50%, with trilineage hematopoiesis, see comment  --No increase in blasts  --Adequate megakaryocytes  --Focal stainable iron is present and does not appear increased    Comment:  Concomitantly submitted flow cytometric analysis detects no abnormal hematopoietic population.  B cells are polyclonal with a subset of hematogones detected, and T-cells are immunophenotypically unremarkable.  The blast gate is not increased.    No overt morphologic dysplasia is appreciated.  Correlate clinically and with any available cytogenetic and molecular studies.           No results found for this or any previous visit (from the past 24 hour(s)).    Assessment/Plan :       Anemia/Thrombocytopenia-stable  -Mild and worsening this past year but now stable  -most likely multifactorial but could primarily be due to hx of heavy NSAID use, age and co morbidities  -Full anemia workup largely unremarkable  -But path rev did show White blood cells- normal in number and in differential count   -Rare neutrophil with pelgeroid nuclei on path rev of CBC seen on path rev  -BMBx performed and negative for dysplastic cells but hypercellular for age (see above), could be a developing MDS  -repeat labs today with liver us  -will call pt with results, if unremarkable will see pt back in 6  months      Hypertension, benign  -stable on current meds  -PCP working to adjust       Hx of depression/Anxiety  -Denies SI or HI  -On cymbalta  -Per PCP        Benign prostatic hyperplasia with nocturia  - Controlled on doxazosin  -Per PCP        Primary osteoarthritis of both knees  - Partially controled on Voltaren gel  -Per PCP       Lumbar radiculopathy/Degenerative disc disease, lumbar/Chronic midline low back pain without sciatica  -Chronic   -On gabapentin  -Pt considering alternative pain meds and agreeable to think about pain management, clinic number provided  -Per PCP          Time spent on case: 30 minutes      Summary of orders placed this encounter:  No orders of the defined types were placed in this encounter.      Future Appointments   Date Time Provider Department Center   1/2/2024 10:00 AM Henrique Patton MD [facility] HEM ONC [facility] Cl   3/15/2024  8:30 AM LAB, [facility] DRAW STATION [facility] LAB [facility]   3/21/2024 10:00 AM Maxx Mcintosh Jr., MD [facility]           Henrique Patton MD   Hematology/oncology, [facility]

## 2024-01-18 DIAGNOSIS — I10 HYPERTENSION, BENIGN: Chronic | ICD-10-CM

## 2024-01-18 RX ORDER — CARVEDILOL 12.5 MG/1
12.5 TABLET ORAL 2 TIMES DAILY WITH MEALS
Qty: 180 TABLET | Refills: 1 | Status: SHIPPED | OUTPATIENT
Start: 2024-01-18

## 2024-01-18 NOTE — TELEPHONE ENCOUNTER
----- Message from Iván Riojas sent at 1/18/2024  4:17 PM CST -----  Regarding: self  Type: RX Refill Request    Who Called:self    Have you contacted your pharmacy:no    Refill    RX Name and Strength: Disp Refills Start End LIZETTE  carvediloL (COREG) 12.5 MG tablet             Preferred Pharmacy with phone number:  [facility] #[id number] - [place] - [address] AT [facility] [address]  [address]  [place] [address]  Phone: [phone] Fax: [phone]        Local or Mail Order:local    Would the patient rather a call back or a response via My [facility]? callback    Best Call Back Number:[phone]    Additional Information:

## 2024-01-18 NOTE — TELEPHONE ENCOUNTER
No care due was identified.  Health [facility] Embedded Care Due Messages. Reference number: [id number].   1/18/2024 4:23:27 PM CST

## 2024-01-25 ENCOUNTER — HOSPITAL ENCOUNTER (OUTPATIENT)
Dept: RADIOLOGY | Facility: HOSPITAL | Age: 84
Discharge: HOME | End: 2024-01-25
Attending: STUDENT IN AN ORGANIZED HEALTH CARE EDUCATION/TRAINING PROGRAM
Payer: MEDICARE

## 2024-01-25 DIAGNOSIS — D69.6 THROMBOCYTOPENIA: ICD-10-CM

## 2024-01-25 DIAGNOSIS — F10.11 HISTORY OF ETOH ABUSE: ICD-10-CM

## 2024-01-25 PROCEDURE — 76705 ECHO EXAM OF ABDOMEN: CPT | Mod: 26,HCNC,, | Performed by: RADIOLOGY

## 2024-01-25 PROCEDURE — 76705 ECHO EXAM OF ABDOMEN: CPT | Mod: TC,HCNC

## 2024-01-26 ENCOUNTER — TELEPHONE (OUTPATIENT)
Dept: HEMATOLOGY/ONCOLOGY | Facility: CLINIC | Age: 84
End: 2024-01-26
Payer: MEDICARE

## 2024-01-26 NOTE — TELEPHONE ENCOUNTER
Left voicemail for pt daughter with below message. NL         ----- Message from Henrique Patton MD sent at 1/25/2024  8:41 AM CST -----  Please let his daughter know that his abd us looked fine and no abnormalities seen  ----- Message -----  From: Interface, Rad Results In  Sent: 1/25/2024   8:34 AM CST  To: Henrique Patton MD

## 2024-02-09 NOTE — TELEPHONE ENCOUNTER
No care due was identified.  Health [facility] Embedded Care Due Messages. Reference number: [id number].   2/09/2024 4:44:52 PM CST

## 2024-02-09 NOTE — TELEPHONE ENCOUNTER
----- Message from Harpreet Gonzalez sent at 2/9/2024  1:54 PM CST -----  Regarding: Daughter [phone]  Type: RX Refill Request    Who Called:  Daughter     Have you contacted your pharmacy: no     Refill    RX Name and Strength:  cloNIDine (CATAPRES) 0.1 MG tablet    Preferred Pharmacy with phone number:   [facility] #[id number]  [place] - [address] AT [facility] [address]  [address]  [place] [address]  Phone: [phone] Fax: [phone]    Local or Mail Order: local     Would the patient rather a call back or a response via My [facility]? Call back     Best Call Back Number:[phone]    Additional Information:     Thank you.

## 2024-02-12 RX ORDER — CLONIDINE HYDROCHLORIDE 0.1 MG/1
0.1 TABLET ORAL 2 TIMES DAILY
Qty: 60 TABLET | Refills: 11 | OUTPATIENT
Start: 2024-02-12 | End: 2025-02-11

## 2024-03-08 DIAGNOSIS — J31.0 CHRONIC RHINITIS: ICD-10-CM

## 2024-03-08 RX ORDER — FLUTICASONE PROPIONATE 50 MCG
SPRAY, SUSPENSION (ML) NASAL
Qty: 16 G | Refills: 5 | Status: SHIPPED | OUTPATIENT
Start: 2024-03-08

## 2024-03-08 NOTE — TELEPHONE ENCOUNTER
No care due was identified.  Health [facility] Embedded Care Due Messages. Reference number: [id number].   3/08/2024 2:57:02 PM CST

## 2024-03-08 NOTE — TELEPHONE ENCOUNTER
----- Message from Iván Riojas sent at 3/8/2024  2:22 PM CST -----  Regarding: self  Type: RX Refill Request    Who Called:self    Have you contacted your pharmacy:    Refill    RX Name and Strength:fluticasone propionate (FLONASE) 50 mcg/actuation nasal spray    Preferred Pharmacy with phone number:  [facility] #[id number] - [place] - [address] AT [facility] [address]  [address]  [place] [address]  Phone: [phone] Fax: [phone]    Local or Mail Order:local    Would the patient rather a call back or a response via My [facility]? callback    Best Call Back Number:[phone]    Additional Information:

## 2024-03-08 NOTE — TELEPHONE ENCOUNTER
----- Message from Iván Riojas sent at 3/8/2024  2:22 PM CST -----  Regarding: self  Type: RX Refill Request    Who Called:self    Have you contacted your pharmacy:    Refill    RX Name and Strength:fluticasone propionate (FLONASE) 50 mcg/actuation nasal spray    Preferred Pharmacy with phone number:  [facility] #[id number] - [place] - [address] AT [facility] [address]  [address]  [place] [address]  Phone: [phone] Fax: [phone]    Local or Mail Order:local    Would the patient rather a call back or a response via My [facility]? callback    Best Call Back Number:[phone]    Additional Information:

## 2024-03-15 ENCOUNTER — LAB VISIT (OUTPATIENT)
Dept: LAB | Facility: HOSPITAL | Age: 84
End: 2024-03-15
Payer: MEDICARE

## 2024-03-15 DIAGNOSIS — F10.11 HISTORY OF ETOH ABUSE: ICD-10-CM

## 2024-03-15 DIAGNOSIS — D69.6 THROMBOCYTOPENIA: ICD-10-CM

## 2024-03-15 LAB
ALBUMIN SERPL BCP-MCNC: 3.9 G/DL (ref 3.5–5.2)
ALP SERPL-CCNC: 65 U/L (ref 55–135)
ALT SERPL W/O P-5'-P-CCNC: 9 U/L (ref 10–44)
ANION GAP SERPL CALC-SCNC: 8 MMOL/L (ref 8–16)
AST SERPL-CCNC: 17 U/L (ref 10–40)
BASOPHILS # BLD AUTO: 0.03 K/UL (ref 0–0.2)
BASOPHILS NFR BLD: 0.6 % (ref 0–1.9)
BILIRUB SERPL-MCNC: 1.1 MG/DL (ref 0.1–1)
BUN SERPL-MCNC: 18 MG/DL (ref 8–23)
CALCIUM SERPL-MCNC: 9.2 MG/DL (ref 8.7–10.5)
CHLORIDE SERPL-SCNC: 101 MMOL/L (ref 95–110)
CO2 SERPL-SCNC: 28 MMOL/L (ref 23–29)
CREAT SERPL-MCNC: 0.9 MG/DL (ref 0.5–1.4)
DIFFERENTIAL METHOD BLD: ABNORMAL
EOSINOPHIL # BLD AUTO: 0.2 K/UL (ref 0–0.5)
EOSINOPHIL NFR BLD: 3.7 % (ref 0–8)
ERYTHROCYTE [DISTWIDTH] IN BLOOD BY AUTOMATED COUNT: 13.6 % (ref 11.5–14.5)
EST. GFR  (NO RACE VARIABLE): >60 ML/MIN/1.73 M^2
GLUCOSE SERPL-MCNC: 99 MG/DL (ref 70–110)
HCT VFR BLD AUTO: 38.5 % (ref 40–54)
HGB BLD-MCNC: 12.6 G/DL (ref 14–18)
IMM GRANULOCYTES # BLD AUTO: 0.02 K/UL (ref 0–0.04)
IMM GRANULOCYTES NFR BLD AUTO: 0.4 % (ref 0–0.5)
LYMPHOCYTES # BLD AUTO: 1.6 K/UL (ref 1–4.8)
LYMPHOCYTES NFR BLD: 32.9 % (ref 18–48)
MCH RBC QN AUTO: 31.1 PG (ref 27–31)
MCHC RBC AUTO-ENTMCNC: 32.7 G/DL (ref 32–36)
MCV RBC AUTO: 95 FL (ref 82–98)
MONOCYTES # BLD AUTO: 0.4 K/UL (ref 0.3–1)
MONOCYTES NFR BLD: 8.8 % (ref 4–15)
NEUTROPHILS # BLD AUTO: 2.6 K/UL (ref 1.8–7.7)
NEUTROPHILS NFR BLD: 53.6 % (ref 38–73)
NRBC BLD-RTO: 0 /100 WBC
PLATELET # BLD AUTO: 149 K/UL (ref 150–450)
PMV BLD AUTO: 12.6 FL (ref 9.2–12.9)
POTASSIUM SERPL-SCNC: 5.2 MMOL/L (ref 3.5–5.1)
PROT SERPL-MCNC: 6.4 G/DL (ref 6–8.4)
RBC # BLD AUTO: 4.05 M/UL (ref 4.6–6.2)
SODIUM SERPL-SCNC: 137 MMOL/L (ref 136–145)
WBC # BLD AUTO: 4.9 K/UL (ref 3.9–12.7)

## 2024-03-15 PROCEDURE — 80053 COMPREHEN METABOLIC PANEL: CPT | Mod: HCNC | Performed by: STUDENT IN AN ORGANIZED HEALTH CARE EDUCATION/TRAINING PROGRAM

## 2024-03-15 PROCEDURE — 36415 COLL VENOUS BLD VENIPUNCTURE: CPT | Mod: HCNC,PN | Performed by: STUDENT IN AN ORGANIZED HEALTH CARE EDUCATION/TRAINING PROGRAM

## 2024-03-15 PROCEDURE — 85025 COMPLETE CBC W/AUTO DIFF WBC: CPT | Mod: HCNC | Performed by: STUDENT IN AN ORGANIZED HEALTH CARE EDUCATION/TRAINING PROGRAM

## 2024-03-21 ENCOUNTER — OFFICE VISIT (OUTPATIENT)
Dept: FAMILY MEDICINE | Facility: CLINIC | Age: 84
End: 2024-03-21
Payer: MEDICARE

## 2024-03-21 VITALS
HEIGHT: 68 IN | OXYGEN SATURATION: 96 % | BODY MASS INDEX: 30.98 KG/M2 | WEIGHT: 204.38 LBS | TEMPERATURE: 98 F | SYSTOLIC BLOOD PRESSURE: 136 MMHG | DIASTOLIC BLOOD PRESSURE: 60 MMHG | HEART RATE: 61 BPM

## 2024-03-21 DIAGNOSIS — D69.2 SENILE PURPURA: Chronic | ICD-10-CM

## 2024-03-21 DIAGNOSIS — R06.83 SNORING: ICD-10-CM

## 2024-03-21 DIAGNOSIS — D69.6 THROMBOCYTOPENIA: Chronic | ICD-10-CM

## 2024-03-21 DIAGNOSIS — F33.0 MDD (MAJOR DEPRESSIVE DISORDER), RECURRENT EPISODE, MILD: Chronic | ICD-10-CM

## 2024-03-21 DIAGNOSIS — I10 HYPERTENSION, BENIGN: Primary | Chronic | ICD-10-CM

## 2024-03-21 DIAGNOSIS — E87.5 HYPERKALEMIA: ICD-10-CM

## 2024-03-21 DIAGNOSIS — F11.20 OPIOID DEPENDENCE WITH CURRENT USE: Chronic | ICD-10-CM

## 2024-03-21 DIAGNOSIS — F41.1 GENERALIZED ANXIETY DISORDER: Chronic | ICD-10-CM

## 2024-03-21 PROBLEM — E11.9 TYPE 2 DIABETES MELLITUS WITHOUT COMPLICATION, UNSPECIFIED WHETHER LONG TERM INSULIN USE: Status: RESOLVED | Noted: 2024-01-02 | Resolved: 2024-03-21

## 2024-03-21 PROCEDURE — 1125F AMNT PAIN NOTED PAIN PRSNT: CPT | Mod: HCNC,CPTII,S$GLB, | Performed by: FAMILY MEDICINE

## 2024-03-21 PROCEDURE — 3078F DIAST BP <80 MM HG: CPT | Mod: HCNC,CPTII,S$GLB, | Performed by: FAMILY MEDICINE

## 2024-03-21 PROCEDURE — 3288F FALL RISK ASSESSMENT DOCD: CPT | Mod: HCNC,CPTII,S$GLB, | Performed by: FAMILY MEDICINE

## 2024-03-21 PROCEDURE — 1159F MED LIST DOCD IN RCRD: CPT | Mod: HCNC,CPTII,S$GLB, | Performed by: FAMILY MEDICINE

## 2024-03-21 PROCEDURE — 99214 OFFICE O/P EST MOD 30 MIN: CPT | Mod: HCNC,S$GLB,, | Performed by: FAMILY MEDICINE

## 2024-03-21 PROCEDURE — 3075F SYST BP GE 130 - 139MM HG: CPT | Mod: HCNC,CPTII,S$GLB, | Performed by: FAMILY MEDICINE

## 2024-03-21 PROCEDURE — 99999 PR PBB SHADOW E&M-EST. PATIENT-LVL III: CPT | Mod: PBBFAC,HCNC,, | Performed by: FAMILY MEDICINE

## 2024-03-21 PROCEDURE — 1101F PT FALLS ASSESS-DOCD LE1/YR: CPT | Mod: HCNC,CPTII,S$GLB, | Performed by: FAMILY MEDICINE

## 2024-03-21 PROCEDURE — 1160F RVW MEDS BY RX/DR IN RCRD: CPT | Mod: HCNC,CPTII,S$GLB, | Performed by: FAMILY MEDICINE

## 2024-03-21 RX ORDER — BENZONATATE 100 MG/1
100 CAPSULE ORAL 3 TIMES DAILY
COMMUNITY
Start: 2023-11-15

## 2024-03-21 RX ORDER — ALBUTEROL SULFATE 90 UG/1
AEROSOL, METERED RESPIRATORY (INHALATION)
COMMUNITY

## 2024-03-21 RX ORDER — CETIRIZINE HYDROCHLORIDE 10 MG/1
1 TABLET ORAL DAILY
COMMUNITY
Start: 2024-02-22 | End: 2024-03-23

## 2024-03-21 RX ORDER — COVID-19 VACCINE, MRNA 0.05 MG/.48ML
INJECTION, SUSPENSION INTRAMUSCULAR
COMMUNITY
Start: 2023-11-08

## 2024-03-21 RX ORDER — MIDAZOLAM HYDROCHLORIDE 1 MG/ML
INJECTION INTRAMUSCULAR; INTRAVENOUS
COMMUNITY
Start: 2023-12-05

## 2024-03-21 RX ORDER — CHLORHEXIDINE GLUCONATE ORAL RINSE 1.2 MG/ML
SOLUTION DENTAL
COMMUNITY
Start: 2023-09-30

## 2024-03-21 RX ORDER — FENTANYL CITRATE 50 UG/ML
INJECTION, SOLUTION INTRAMUSCULAR; INTRAVENOUS
COMMUNITY
Start: 2023-12-05

## 2024-03-24 ENCOUNTER — TELEPHONE (OUTPATIENT)
Dept: FAMILY MEDICINE | Facility: CLINIC | Age: 84
End: 2024-03-24
Payer: MEDICARE

## 2024-03-24 PROBLEM — F11.20 OPIOID DEPENDENCE WITH CURRENT USE: Chronic | Status: ACTIVE | Noted: 2024-03-24

## 2024-03-24 PROBLEM — F33.0 MDD (MAJOR DEPRESSIVE DISORDER), RECURRENT EPISODE, MILD: Chronic | Status: ACTIVE | Noted: 2022-11-22

## 2024-03-24 PROBLEM — Z86.2 HISTORY OF PANCYTOPENIA: Status: RESOLVED | Noted: 2023-12-05 | Resolved: 2024-03-21

## 2024-03-25 NOTE — PROGRESS NOTES
"  Patient Name: Javid Daniel    : 1940  MRN: [id number]      Subjective:     Patient ID: Javid is a 83 y.o. male    Chief Complaint:  No chief complaint on file.    83-year-old male previously known to me presents for follow-up on hypertension and to review recent labs.  He does not have his medications with him today as he has been advised in the past.  Throughout the visit, patient request refills on alprazolam which he has previously been informed by myself that I would not prescribed.  He also requests medication for the sleep problems.  He previously declined referral to Sleep Medicine despite being informed of all the times that he is risk factor for sleep apnea and that this needs to be addressed.  Patient reports he does not want to see a sleep provider he just wants medication.  Patient also requests refills for his pain medication.  This is not prescribed by myself.         Review of Systems   Constitutional:  Negative for fever and unexpected weight change.   Respiratory:  Negative for shortness of breath.    Cardiovascular:  Negative for chest pain and palpitations.   Gastrointestinal:  Negative for abdominal pain.   Musculoskeletal:  Positive for back pain and leg pain.   Integumentary:  Negative for pallor.   Neurological:  Negative for dizziness, light-headedness, numbness and headaches.   Hematological:  Does not bruise/bleed easily.   Psychiatric/Behavioral:  Positive for decreased concentration and sleep disturbance. Negative for confusion, self-injury and suicidal ideas. The patient is nervous/anxious.         Objective:   /60 (BP Location: Right arm, Patient Position: Sitting, BP Method: Large (Manual))   Pulse 61   Temp 98.2 °F (36.8 °C) (Oral)   Ht 5' 8" (1.727 m)   Wt 92.7 kg (204 lb 5.9 oz)   SpO2 96%   BMI 31.07 kg/m²     Physical Exam  Vitals reviewed.   Constitutional:       General: He is not in acute distress.     Appearance: He is well-developed. He is not " diaphoretic.      Comments: Using a Rollator for ambulation   HENT:      Head: Normocephalic and atraumatic.      Right Ear: External ear normal.      Left Ear: External ear normal.      Nose: Nose normal.      Mouth/Throat:      Mouth: Mucous membranes are moist.   Eyes:      General:         Right eye: No discharge.         Left eye: No discharge.      Conjunctiva/sclera: Conjunctivae normal.      Pupils: Pupils are equal, round, and reactive to light.   Neck:      Thyroid: No thyromegaly.      Trachea: No tracheal deviation.   Cardiovascular:      Rate and Rhythm: Normal rate and regular rhythm.      Pulses:           Radial pulses are 2+ on the right side and 2+ on the left side.      Heart sounds: Normal heart sounds, S1 normal and S2 normal.   Pulmonary:      Effort: Pulmonary effort is normal. No respiratory distress.      Breath sounds: Normal breath sounds. No wheezing, rhonchi or rales.   Abdominal:      General: Bowel sounds are normal. There is no distension.      Palpations: Abdomen is soft. Abdomen is not rigid. There is no mass.      Tenderness: There is no abdominal tenderness. There is no guarding.   Musculoskeletal:      Cervical back: Normal range of motion and neck supple.   Lymphadenopathy:      Cervical: No cervical adenopathy.   Skin:     General: Skin is warm and dry.      Capillary Refill: Capillary refill takes less than 2 seconds.      Findings: Purpura: various on upper extremities.   Neurological:      Mental Status: He is alert and oriented to person, place, and time.      Cranial Nerves: No cranial nerve deficit.      Sensory: No sensory deficit.      Motor: No atrophy or abnormal muscle tone.      Deep Tendon Reflexes:      Reflex Scores:       Patellar reflexes are 2+ on the right side and 2+ on the left side.  Psychiatric:         Mood and Affect: Mood is anxious.         Speech: Speech is tangential.         Behavior: Behavior is agitated. Behavior is not aggressive (however very  confrontational) or hyperactive.         Thought Content: Thought content does not include homicidal or suicidal ideation.        Assessment        ICD-10-CM ICD-9-CM   1. Hypertension, benign  I10 401.1   2. Thrombocytopenia  D69.6 287.5   3. Hyperkalemia  E87.5 276.7   4. Snoring  R06.83 786.09   5. Senile purpura  D69.2 287.2   6. MDD (major depressive disorder), recurrent episode, mild  F33.0 296.31   7. Generalized anxiety disorder  F41.1 300.02   8. Opioid dependence with current use  F11.20 304.00         Plan:     1. Hypertension, benign  Overview:  Fair blood pressure control.  Continue current regimen.      2. Thrombocytopenia  Assessment & Plan:  Platelet count fair.  Continue monitoring.      3. Hyperkalemia  Assessment & Plan:  I advised the patient on having potassium level test that has a given recent hyperkalemia, discussed this is to be tested to make sure it is improving, and more importantly not worsening.  Discussed with him that hyperkalemia can have a muscular complications as well as cardiac complications including heart attacks severe.  Patient states that he is flying out of the country tomorrow and will not be back for several months and request medication sent for this I informed him that can not send in the medication for hyperkalemia, without verifying his level on a repeat test, and that depending on the results, my recommendation maybe different.  If very elevated he would need to go to the hospital.  If just barely elevated would recommended hydration and repeating the lab results in a few weeks.  Patient states that I have to come up with another solution as he is flying tomorrow.  I once again informed the patient that medically my recommendation is to repeat the test and he will need to await the results.  Patient then became agitated as stated how I would address his trip tomorrow.  I informed him that I am his physician, and I am dressing his medical problems and I am not the one  who scheduled his trip to leave tomorrow, however medically I am recommending he had a test done today.  Patient then got up as such chemically said in Togolese thank you for not helping and left    Orders:   -     Potassium; Future; Expected date: 03/21/2024    4. Snoring  Assessment & Plan:  I strongly encouraged patient to make appointment with Sleep Medicine as he has multiple risk factors for sleep apnea.  Add informed him that this could be contributing to his difficulty with insomnia.    Orders:   -     Ambulatory referral/consult to Sleep Disorders; Future; Expected date: 03/28/2024    5. Senile purpura  Assessment & Plan:  No bleeding reported.  Continue monitoring platelet count.      6. MDD (major depressive disorder), recurrent episode, mild  Assessment & Plan:  Patient last seen by Psychiatry in December 2023.  He was advised to follow up in six months.  He has not scheduled this yet.  I strongly encouraged patient to make sure he gets this scheduled as soon as possible as that is only three-month away.        7. Generalized anxiety disorder  Assessment & Plan:  Patient request multiple times a refills on alprazolam.  I reminded him that I have informed him many times I would not refill that medication.  There are many reasons why would not.  These include long-term implications of benzodiazepine.  Risk of benzodiazepine use in elderly patients.  And also he is already taking gabapentin and Norco for pain management and adding a benzodiazepine would be a high-risk of complications from interactions from these medications.  The reminded him that this cause a significant decrease in his blood pressure, as well as cause over-sedation which can potentially result in an injury.  Reminded him that if he is having concerns with anxiety, he needs to schedule a follow up with Psychiatry.      8. Opioid dependence with current use  Overview:  Prescribed hydrocodone-acetaminophen by Orthopedics    Assessment &  Plan:  This medication is prescribed by his orthopedic provider.  In addition he is on gabapentin prescribed by myself.  I informed the patient that he needs to follow up with Orthopedics for continue narcotic medication refill as I would not refill this myself             -Maxx Mcintosh Jr., MD, AAHIVS      This office note has been dictated.  This dictation has been generated using M-Modal Fluency Direct dictation; some phonetic errors may occur.         There are no Patient Instructions on file for this visit.      No follow-ups on file.   Future Appointments   Date Time Provider Department Center   7/2/2024 10:00 AM LAB, [facility] LAB [facility] [place]

## 2024-03-25 NOTE — ASSESSMENT & PLAN NOTE
Patient last seen by Psychiatry in December 2023.  He was advised to follow up in six months.  He has not scheduled this yet.  I strongly encouraged patient to make sure he gets this scheduled as soon as possible as that is only three-month away.

## 2024-03-25 NOTE — ASSESSMENT & PLAN NOTE
Patient request multiple times a refills on alprazolam.  I reminded him that I have informed him many times I would not refill that medication.  There are many reasons why would not.  These include long-term implications of benzodiazepine.  Risk of benzodiazepine use in elderly patients.  And also he is already taking gabapentin and Norco for pain management and adding a benzodiazepine would be a high-risk of complications from interactions from these medications.  The reminded him that this cause a significant decrease in his blood pressure, as well as cause over-sedation which can potentially result in an injury.  Reminded him that if he is having concerns with anxiety, he needs to schedule a follow up with Psychiatry.

## 2024-03-25 NOTE — ASSESSMENT & PLAN NOTE
This medication is prescribed by his orthopedic provider.  In addition he is on gabapentin prescribed by myself.  I informed the patient that he needs to follow up with Orthopedics for continue narcotic medication refill as I would not refill this myself

## 2024-03-25 NOTE — TELEPHONE ENCOUNTER
Patient has been scheduled for labs and OV. Appointment letters have been mailed out per patient's request.

## 2024-05-09 DIAGNOSIS — M48.062 SPINAL STENOSIS OF LUMBAR REGION WITH NEUROGENIC CLAUDICATION: ICD-10-CM

## 2024-05-09 RX ORDER — GABAPENTIN 600 MG/1
TABLET ORAL
Qty: 360 TABLET | Refills: 3 | Status: SHIPPED | OUTPATIENT
Start: 2024-05-09

## 2024-05-11 ENCOUNTER — PATIENT MESSAGE (OUTPATIENT)
Dept: PAIN MEDICINE | Facility: CLINIC | Age: 84
End: 2024-05-11
Payer: MEDICARE

## 2024-06-26 ENCOUNTER — PATIENT OUTREACH (OUTPATIENT)
Dept: ADMINISTRATIVE | Facility: HOSPITAL | Age: 84
End: 2024-06-26
Payer: MEDICARE

## 2024-06-26 DIAGNOSIS — E11.9 TYPE 2 DIABETES MELLITUS WITHOUT COMPLICATION, WITHOUT LONG-TERM CURRENT USE OF INSULIN: Primary | ICD-10-CM

## 2024-07-02 ENCOUNTER — LAB VISIT (OUTPATIENT)
Dept: LAB | Facility: HOSPITAL | Age: 84
End: 2024-07-02
Attending: STUDENT IN AN ORGANIZED HEALTH CARE EDUCATION/TRAINING PROGRAM
Payer: MEDICARE

## 2024-07-02 DIAGNOSIS — D69.6 THROMBOCYTOPENIA: ICD-10-CM

## 2024-07-02 DIAGNOSIS — F10.11 HISTORY OF ETOH ABUSE: ICD-10-CM

## 2024-07-02 LAB
ALBUMIN SERPL BCP-MCNC: 3.8 G/DL (ref 3.5–5.2)
ALP SERPL-CCNC: 49 U/L (ref 55–135)
ALT SERPL W/O P-5'-P-CCNC: 9 U/L (ref 10–44)
ANION GAP SERPL CALC-SCNC: 7 MMOL/L (ref 8–16)
AST SERPL-CCNC: 16 U/L (ref 10–40)
BASOPHILS # BLD AUTO: 0.02 K/UL (ref 0–0.2)
BASOPHILS NFR BLD: 0.5 % (ref 0–1.9)
BILIRUB SERPL-MCNC: 0.8 MG/DL (ref 0.1–1)
BUN SERPL-MCNC: 15 MG/DL (ref 8–23)
CALCIUM SERPL-MCNC: 9.7 MG/DL (ref 8.7–10.5)
CHLORIDE SERPL-SCNC: 106 MMOL/L (ref 95–110)
CO2 SERPL-SCNC: 29 MMOL/L (ref 23–29)
CREAT SERPL-MCNC: 0.9 MG/DL (ref 0.5–1.4)
DIFFERENTIAL METHOD BLD: ABNORMAL
EOSINOPHIL # BLD AUTO: 0.2 K/UL (ref 0–0.5)
EOSINOPHIL NFR BLD: 4.8 % (ref 0–8)
ERYTHROCYTE [DISTWIDTH] IN BLOOD BY AUTOMATED COUNT: 14.6 % (ref 11.5–14.5)
EST. GFR  (NO RACE VARIABLE): >60 ML/MIN/1.73 M^2
GLUCOSE SERPL-MCNC: 100 MG/DL (ref 70–110)
HCT VFR BLD AUTO: 38.3 % (ref 40–54)
HGB BLD-MCNC: 12 G/DL (ref 14–18)
IMM GRANULOCYTES # BLD AUTO: 0.01 K/UL (ref 0–0.04)
IMM GRANULOCYTES NFR BLD AUTO: 0.3 % (ref 0–0.5)
LYMPHOCYTES # BLD AUTO: 1.5 K/UL (ref 1–4.8)
LYMPHOCYTES NFR BLD: 37.8 % (ref 18–48)
MCH RBC QN AUTO: 29.7 PG (ref 27–31)
MCHC RBC AUTO-ENTMCNC: 31.3 G/DL (ref 32–36)
MCV RBC AUTO: 95 FL (ref 82–98)
MONOCYTES # BLD AUTO: 0.4 K/UL (ref 0.3–1)
MONOCYTES NFR BLD: 9.5 % (ref 4–15)
NEUTROPHILS # BLD AUTO: 1.9 K/UL (ref 1.8–7.7)
NEUTROPHILS NFR BLD: 47.1 % (ref 38–73)
NRBC BLD-RTO: 0 /100 WBC
PLATELET # BLD AUTO: 129 K/UL (ref 150–450)
PMV BLD AUTO: 12.6 FL (ref 9.2–12.9)
POTASSIUM SERPL-SCNC: 4.8 MMOL/L (ref 3.5–5.1)
PROT SERPL-MCNC: 6.6 G/DL (ref 6–8.4)
RBC # BLD AUTO: 4.04 M/UL (ref 4.6–6.2)
SODIUM SERPL-SCNC: 142 MMOL/L (ref 136–145)
WBC # BLD AUTO: 3.99 K/UL (ref 3.9–12.7)

## 2024-07-02 PROCEDURE — 36415 COLL VENOUS BLD VENIPUNCTURE: CPT | Mod: HCNC,PN | Performed by: STUDENT IN AN ORGANIZED HEALTH CARE EDUCATION/TRAINING PROGRAM

## 2024-07-02 PROCEDURE — 80053 COMPREHEN METABOLIC PANEL: CPT | Mod: HCNC | Performed by: STUDENT IN AN ORGANIZED HEALTH CARE EDUCATION/TRAINING PROGRAM

## 2024-07-02 PROCEDURE — 85025 COMPLETE CBC W/AUTO DIFF WBC: CPT | Mod: HCNC | Performed by: STUDENT IN AN ORGANIZED HEALTH CARE EDUCATION/TRAINING PROGRAM

## 2024-07-03 ENCOUNTER — TELEPHONE (OUTPATIENT)
Dept: HEMATOLOGY/ONCOLOGY | Facility: CLINIC | Age: 84
End: 2024-07-03
Payer: MEDICARE

## 2024-07-03 NOTE — TELEPHONE ENCOUNTER
Left voicemail  for patient with below message.        ----- Message from Henrique Patton MD sent at 7/2/2024 11:14 AM CDT -----  Please let him know labs are stable and we will follow up on next visit  ----- Message -----  From: [facility] Lab Interface  Sent: 7/2/2024  10:03 AM CDT  To: Henrique Patton MD

## 2024-08-26 ENCOUNTER — PATIENT OUTREACH (OUTPATIENT)
Dept: ADMINISTRATIVE | Facility: HOSPITAL | Age: 84
End: 2024-08-26
Payer: MEDICARE

## 2024-08-27 ENCOUNTER — TELEPHONE (OUTPATIENT)
Dept: FAMILY MEDICINE | Facility: CLINIC | Age: 84
End: 2024-08-27
Payer: MEDICARE

## 2024-08-27 NOTE — TELEPHONE ENCOUNTER
----- Message from Cyril Davis RN sent at 8/26/2024  3:59 PM CDT -----  Regarding: FW: missed call    ----- Message -----  From: Sammi Mai  Sent: 8/26/2024  11:15 AM CDT  To: Arnaldo Lilly Staff  Subject: missed call                                      Name of caller: son       What is the requesting detail: returning a call. Please advise       Can the clinic reply by [facility]:       What number to call back: [phone]   AB.[id number]   - intubated , extubated   - improvement in acidosis, hypercapnia s/p intubation  - mental status improving

## 2024-09-10 DIAGNOSIS — F33.0 MDD (MAJOR DEPRESSIVE DISORDER), RECURRENT EPISODE, MILD: ICD-10-CM

## 2024-09-10 DIAGNOSIS — F41.1 GAD (GENERALIZED ANXIETY DISORDER): ICD-10-CM

## 2024-09-10 RX ORDER — DULOXETIN HYDROCHLORIDE 20 MG/1
20 CAPSULE, DELAYED RELEASE ORAL
Qty: 90 CAPSULE | Refills: 0 | Status: SHIPPED | OUTPATIENT
Start: 2024-09-10

## 2024-09-12 DIAGNOSIS — J31.0 CHRONIC RHINITIS: ICD-10-CM

## 2024-09-12 DIAGNOSIS — R35.1 BENIGN PROSTATIC HYPERPLASIA WITH NOCTURIA: Chronic | ICD-10-CM

## 2024-09-12 DIAGNOSIS — N40.1 BENIGN PROSTATIC HYPERPLASIA WITH NOCTURIA: Chronic | ICD-10-CM

## 2024-09-12 DIAGNOSIS — I10 HYPERTENSION, BENIGN: Chronic | ICD-10-CM

## 2024-09-12 RX ORDER — DOXAZOSIN 4 MG/1
4 TABLET ORAL NIGHTLY
Qty: 90 TABLET | Refills: 0 | Status: SHIPPED | OUTPATIENT
Start: 2024-09-12

## 2024-09-12 RX ORDER — AMLODIPINE BESYLATE 10 MG/1
10 TABLET ORAL DAILY
Qty: 90 TABLET | Refills: 0 | Status: SHIPPED | OUTPATIENT
Start: 2024-09-12

## 2024-09-12 RX ORDER — LEVOCETIRIZINE DIHYDROCHLORIDE 5 MG/1
5 TABLET, FILM COATED ORAL NIGHTLY
Qty: 90 TABLET | Refills: 0 | Status: SHIPPED | OUTPATIENT
Start: 2024-09-12

## 2024-09-12 NOTE — TELEPHONE ENCOUNTER
No care due was identified.  [facility] Embedded Care Due Messages. Reference number: [id number].   9/12/2024 10:03:46 AM CDT

## 2024-09-16 ENCOUNTER — TELEPHONE (OUTPATIENT)
Dept: FAMILY MEDICINE | Facility: CLINIC | Age: 84
End: 2024-09-16
Payer: MEDICARE

## 2024-09-16 DIAGNOSIS — J31.0 CHRONIC RHINITIS: ICD-10-CM

## 2024-09-16 NOTE — TELEPHONE ENCOUNTER
Called patient's daughter back and notified that medication was already sent in and to reach out to pharmacy, she verbalized understanding.

## 2024-09-16 NOTE — TELEPHONE ENCOUNTER
----- Message from Edmund Gamez MA sent at 9/16/2024  8:30 AM CDT -----  Type: RX Refill Request    Who Called: Daughter     Have you contacted your pharmacy:yes    Refill or New Rx:refill     RX Name and Strength:Pharmacy states he needs more refills..       levocetirizine (XYZAL) 5 MG tablet      Preferred Pharmacy with phone number:[id number], [place] - [address] AT [facility] [address]   Phone: [phone]  Fax: [phone]          Local or Mail Order:local    Ordering Provider:Arnaldo    Would the patient rather a call back or a response via [facility]? Yes,c all     Best Call Back Number: [phone]

## 2024-09-16 NOTE — TELEPHONE ENCOUNTER
No care due was identified.  [facility] Embedded Care Due Messages. Reference number: [id number].   9/16/2024 4:26:42 PM CDT

## 2024-09-17 RX ORDER — LEVOCETIRIZINE DIHYDROCHLORIDE 5 MG/1
5 TABLET, FILM COATED ORAL NIGHTLY
Qty: 90 TABLET | Refills: 1 | Status: SHIPPED | OUTPATIENT
Start: 2024-09-17

## 2024-09-17 NOTE — TELEPHONE ENCOUNTER
"----- Message from Rik Shah sent at 2024  8:20 AM CDT -----  Regarding: Velia "daughter"  Type: RX Refill Request     Who Called:Velia      Have you contacted your pharmacy: Yes     Refill or New Rx: Refill      RX Name and Strength: levocetirizine (XYZAL) 5 MG tablet// Daughter stated that the doctor needs to send a new prescription because the one that the pharmacy has is  per the pharmacy. Please reach out to the pharmacy as soon as possible because the patient is out of medication and needs his medication.     Preferred Pharmacy with phone number:.  [facility] #[id number] - [place], [place] -  [address] AT [facility] [address]   [address]  [place] [address]  Phone: [phone] Fax: [phone]     Local or Mail Order: Local     Ordering Provider: Dr. Maxx Mcintosh     Would the patient rather a call back or a response via [facility]? Callback      Best Call Back Number: [phone] ask elke Gunn     Additional Information:  "

## 2024-09-17 NOTE — TELEPHONE ENCOUNTER
Refill Routing Note   Medication(s) are not appropriate for processing by [facility] for the following reason(s):        Drug-drug interaction    [facility] action(s):  Defer      Medication Therapy Plan: Drug drug:  cetirizine and levocetirizine    Medication reconciliation completed: Yes   Pharmacist review requested: Yes     Appointments  past 12m or future 3m with PCP    Date Provider   Last Visit   3/21/2024 Maxx Mcintosh Jr., MD   Next Visit   9/30/2024 Maxx Mcintosh Jr., MD   ED visits in past 90 days: 0        Note composed:8:36 AM 09/17/2024

## 2024-09-17 NOTE — TELEPHONE ENCOUNTER
Pharmacy Call Documentation    Pharmacy Called:  To Call Time: 8:33a   Spoke with: Fe Valladares 09/17/2024      Called to verify that the script that was sent on: Fe stated the script has not been received on their end. Will resend.         Current Medication Requested: (refill encounter)   Requested Prescriptions     Pending Prescriptions Disp Refills    levocetirizine (XYZAL) 5 MG tablet 90 tablet 1     Sig: Take 1 tablet (5 mg total) by mouth every evening.      [facility]     Note composed: 09/17/2024 8:32 AM

## 2024-09-17 NOTE — TELEPHONE ENCOUNTER
Javid Daniel  is requesting a refill authorization.  Brief Assessment and Rationale for Refill:  Approve     Medication Therapy Plan:         Medication reconciliation completed: Yes   Pharmacist review requested: Yes   Extended chart review required: Yes   Comments:     Note composed:10:27 AM 09/17/2024

## 2024-09-18 ENCOUNTER — OFFICE VISIT (OUTPATIENT)
Dept: SLEEP MEDICINE | Facility: CLINIC | Age: 84
End: 2024-09-18
Payer: MEDICARE

## 2024-09-18 VITALS
BODY MASS INDEX: 32.14 KG/M2 | HEIGHT: 68 IN | DIASTOLIC BLOOD PRESSURE: 81 MMHG | HEART RATE: 73 BPM | SYSTOLIC BLOOD PRESSURE: 131 MMHG | WEIGHT: 212.06 LBS

## 2024-09-18 DIAGNOSIS — R35.1 NOCTURIA: ICD-10-CM

## 2024-09-18 DIAGNOSIS — F51.09 OTHER INSOMNIA NOT DUE TO A SUBSTANCE OR KNOWN PHYSIOLOGICAL CONDITION: ICD-10-CM

## 2024-09-18 DIAGNOSIS — R06.83 SNORING: ICD-10-CM

## 2024-09-18 DIAGNOSIS — G47.10 HYPERSOMNOLENCE: ICD-10-CM

## 2024-09-18 DIAGNOSIS — R06.81 WITNESSED EPISODE OF APNEA: Primary | ICD-10-CM

## 2024-09-18 PROCEDURE — 99999 PR PBB SHADOW E&M-EST. PATIENT-LVL III: CPT | Mod: PBBFAC,HCNC,, | Performed by: PHYSICIAN ASSISTANT

## 2024-09-18 PROCEDURE — 1159F MED LIST DOCD IN RCRD: CPT | Mod: HCNC,CPTII,S$GLB, | Performed by: PHYSICIAN ASSISTANT

## 2024-09-18 PROCEDURE — 1160F RVW MEDS BY RX/DR IN RCRD: CPT | Mod: HCNC,CPTII,S$GLB, | Performed by: PHYSICIAN ASSISTANT

## 2024-09-18 PROCEDURE — 99204 OFFICE O/P NEW MOD 45 MIN: CPT | Mod: HCNC,S$GLB,, | Performed by: PHYSICIAN ASSISTANT

## 2024-09-18 PROCEDURE — 3075F SYST BP GE 130 - 139MM HG: CPT | Mod: HCNC,CPTII,S$GLB, | Performed by: PHYSICIAN ASSISTANT

## 2024-09-18 PROCEDURE — 1101F PT FALLS ASSESS-DOCD LE1/YR: CPT | Mod: HCNC,CPTII,S$GLB, | Performed by: PHYSICIAN ASSISTANT

## 2024-09-18 PROCEDURE — 3288F FALL RISK ASSESSMENT DOCD: CPT | Mod: HCNC,CPTII,S$GLB, | Performed by: PHYSICIAN ASSISTANT

## 2024-09-18 PROCEDURE — 1125F AMNT PAIN NOTED PAIN PRSNT: CPT | Mod: HCNC,CPTII,S$GLB, | Performed by: PHYSICIAN ASSISTANT

## 2024-09-18 PROCEDURE — 3079F DIAST BP 80-89 MM HG: CPT | Mod: HCNC,CPTII,S$GLB, | Performed by: PHYSICIAN ASSISTANT

## 2024-09-18 NOTE — PROGRESS NOTES
Referred by Maxx Mcintosh Jr., MD     NEW PATIENT VISIT    Javid Daniel  is a pleasant 84 y.o. male  with PMH significant for HTN, thrombocytopenia, OA, lumbar spinal stenosis, chronic pain, BPH, MDD, ISHMAEL, opioid dependence  who presents for sleep evaluation following referral from PCP      C/o loud snoring, witnessed apneas, poor very disrupted and un-refreshing sleep, and excessive daytime sleepiness and fatigue. With son in clinic today that states his father sleeps on and off throughout the entire day. States his family and his PCP have been requesting he get evaluated for DRU for years now, but patient always puts it off. States his father believes his sleep symptoms are related to back pain and would prefer medication over DRU assessment; however, states PCP recently recommended to avoid additional medications until he undergoes an DRU assessment, which is why he presents today. Denies sleep walking/talking. Denies dream enactment behaviors.     SLEEP SCHEDULE   Environment    Bed Time 10PM   Sleep Latency quick   Arousals 2-3   Nocturia 2-3   Back to sleep quick   Wake time 5-6AM   Naps Sleeping on and off throughout the day   Work          Past Medical History:   Diagnosis Date    Anxiety     GERD (gastroesophageal reflux disease)     Hypertension     Nuclear sclerosis of both eyes 03/08/2021    Primary osteoarthritis of both knees 04/09/2023    Type 2 diabetes mellitus without complication, unspecified whether long term insulin use 1/2/2024     Patient Active Problem List   Diagnosis    Weakness of trunk musculature    Poor flexibility of tendon    Decreased range of motion of hip    Weakness of both lower extremities    Benign prostatic hyperplasia with nocturia    Refractive error    Nuclear sclerosis of both eyes    Senile purpura    MDD (major depressive disorder), recurrent episode, mild    Generalized anxiety disorder    Hypertension, benign    Insomnia    Normocytic anemia     Thrombocytopenia    Primary osteoarthritis of both knees    Lumbar spinal stenosis    Lumbar spondylosis    Anxiety    Opioid dependence with current use       Current Outpatient Medications:     acetaminophen (TYLENOL) 325 MG tablet, Take 325 mg by mouth every 6 (six) hours as needed for Pain., Disp: , Rfl:     albuterol (PROVENTIL/VENTOLIN HFA) 90 mcg/actuation inhaler, SMARTSI Puff(s) Via Inhaler Every 6 Hours PRN, Disp: , Rfl:     amLODIPine (NORVASC) 10 MG tablet, Take 1 tablet (10 mg total) by mouth once daily., Disp: 90 tablet, Rfl: 0    benzonatate (TESSALON) 100 MG capsule, Take 100 mg by mouth 3 (three) times daily., Disp: , Rfl:     busPIRone (BUSPAR) 15 MG tablet, Take 1 tablet (15 mg total) by mouth 3 (three) times daily., Disp: 270 tablet, Rfl: 2    carvediloL (COREG) 12.5 MG tablet, Take 1 tablet (12.5 mg total) by mouth 2 (two) times daily with meals., Disp: 180 tablet, Rfl: 1    chlorhexidine (PERIDEX) 0.12 % solution, SMARTSIG:By Mouth, Disp: , Rfl:     COMIRNATY -, 12Y UP,,PF, 30 mcg/0.3 mL injection, , Disp: , Rfl:     diclofenac sodium (VOLTAREN) 1 % Gel, APPLY 3 TO 4 GRAMS TO THE AFFECTED AREA OF THE SKIN TWICE DAILY, Disp: 100 g, Rfl: 12    doxazosin (CARDURA) 4 MG tablet, Take 1 tablet (4 mg total) by mouth every evening., Disp: 90 tablet, Rfl: 0    DULoxetine (CYMBALTA) 20 MG capsule, TAKE 1 CAPSULE(20 MG) BY MOUTH EVERY DAY, Disp: 90 capsule, Rfl: 0    fentaNYL (SUBLIMAZE) 50 mcg/mL injection, , Disp: , Rfl:     fluticasone propionate (FLONASE) 50 mcg/actuation nasal spray, SHAKE LIQUID AND USE 2 SPRAYS(100 MCG) IN EACH NOSTRIL EVERY DAY, Disp: 16 g, Rfl: 5    gabapentin (NEURONTIN) 600 MG tablet, 1 in the morning, 1 in the afternoon, 2 in the evening, Disp: 360 tablet, Rfl: 3    HYDROcodone-acetaminophen (NORCO) 5-325 mg per tablet, Take 1 tablet by mouth daily as needed., Disp: , Rfl:     levocetirizine (XYZAL) 5 MG tablet, Take 1 tablet (5 mg total) by mouth every evening.,  "Disp: 90 tablet, Rfl: 1    midazolam, PF, (VERSED) 1 mg/mL Soln injection, , Disp: , Rfl:     olmesartan (BENICAR) 40 MG tablet, Take 1 tablet (40 mg total) by mouth once daily., Disp: 90 tablet, Rfl: 3    pantoprazole (PROTONIX) 40 MG tablet, Take 1 tablet (40 mg total) by mouth once daily., Disp: 90 tablet, Rfl: 3    cetirizine (ZYRTEC) 10 MG tablet, Take 1 tablet by mouth once daily., Disp: , Rfl:     mirtazapine (REMERON) 15 MG tablet, Take 1 tablet (15 mg total) by mouth every evening., Disp: 90 tablet, Rfl: 2       Vitals:    09/18/24 0813   BP: 131/81   BP Location: Left arm   Patient Position: Sitting   BP Method: Medium (Automatic)   Pulse: 73   Weight: 96.2 kg (212 lb 1.3 oz)   Height: 5' 8" (1.727 m)     Physical Exam:    GEN:   Well-appearing  Psych:  Appropriate affect, demonstrates insight  SKIN:  No rash on the face or bridge of the nose      LABS:   Lab Results   Component Value Date    HGB 12.0 (L) 07/02/2024    CO2 29 07/02/2024         RECORDS REVIEWED:    No previous sleep study    ASSESSMENT    [place] Sleepiness Scale:  Sitting and reading:   3  Watching TV:    3  Passenger in a car x 1 hr:  2  Sitting quietly after lunch:  3  Lying down to rest in PM:  3  Sitting, inactive in public:  2  Sitting+ talking to someone:  0  Stopped in traffic:   2  Total    18/24    PROBLEM DESCRIPTION/ Sx on Presentation  STATUS   Sx DRU   + snoring, + witnessed apneas  + wakes feeling un-refreshed   Son has DRU on CPAP  New   Daytime Sx   + sleepiness when inactive   ESS 18/24 on intake  New   Insomnia   Trouble falling asleep: no issues  Arousals:         2-3 x nightly  Hard to get back to sleep?: no issues    Prior pertinent medications: alprazolam 0.5mg, remeron 15mg  Current pertinent medications: gabapentin 600mg TID, hydrocodone 5mg & 10mg  New   Nocturia   x 2-3 per sleep period  New   Other issues: chronic pain      PLAN      -recommend sleep testing   -PSG ordered  -discussed trial therapy if DRU " present and the patient is open to a trial of CPAP therapy  -discussed DRU and PAP with patient in detail, including possible complications of untreated DRU like heart attack/stroke  -advised on strict driving precautions; advised never to drive drowsy     Advised on plan of care. Answered all patient questions. Patient verbalized understanding and voiced agreement with plan of care.     RTC if dx of DRU made and CPAP ordered, will need follow up 31-90 days after receiving machine for compliance       The patient was given open opportunity to ask questions and/or express concerns about treatment plan. All questions/concerns were discussed.     Two patient identifiers used prior to evaluation.     Offered translation services through language service line, patient declined.

## 2024-09-23 ENCOUNTER — LAB VISIT (OUTPATIENT)
Dept: LAB | Facility: HOSPITAL | Age: 84
End: 2024-09-23
Attending: FAMILY MEDICINE
Payer: MEDICARE

## 2024-09-23 DIAGNOSIS — D69.6 THROMBOCYTOPENIA: Chronic | ICD-10-CM

## 2024-09-23 DIAGNOSIS — D69.2 SENILE PURPURA: Chronic | ICD-10-CM

## 2024-09-23 DIAGNOSIS — I10 HYPERTENSION, BENIGN: Chronic | ICD-10-CM

## 2024-09-23 LAB
ALBUMIN SERPL BCP-MCNC: 3.9 G/DL (ref 3.5–5.2)
ALP SERPL-CCNC: 56 U/L (ref 55–135)
ALT SERPL W/O P-5'-P-CCNC: 9 U/L (ref 10–44)
ANION GAP SERPL CALC-SCNC: 8 MMOL/L (ref 8–16)
AST SERPL-CCNC: 16 U/L (ref 10–40)
BASOPHILS # BLD AUTO: 0.03 K/UL (ref 0–0.2)
BASOPHILS NFR BLD: 0.7 % (ref 0–1.9)
BILIRUB SERPL-MCNC: 0.9 MG/DL (ref 0.1–1)
BUN SERPL-MCNC: 18 MG/DL (ref 8–23)
CALCIUM SERPL-MCNC: 9.5 MG/DL (ref 8.7–10.5)
CHLORIDE SERPL-SCNC: 103 MMOL/L (ref 95–110)
CHOLEST SERPL-MCNC: 145 MG/DL (ref 120–199)
CHOLEST/HDLC SERPL: 2.8 {RATIO} (ref 2–5)
CO2 SERPL-SCNC: 27 MMOL/L (ref 23–29)
CREAT SERPL-MCNC: 0.9 MG/DL (ref 0.5–1.4)
DIFFERENTIAL METHOD BLD: ABNORMAL
EOSINOPHIL # BLD AUTO: 0.2 K/UL (ref 0–0.5)
EOSINOPHIL NFR BLD: 4.8 % (ref 0–8)
ERYTHROCYTE [DISTWIDTH] IN BLOOD BY AUTOMATED COUNT: 13.1 % (ref 11.5–14.5)
EST. GFR  (NO RACE VARIABLE): >60 ML/MIN/1.73 M^2
GLUCOSE SERPL-MCNC: 97 MG/DL (ref 70–110)
HCT VFR BLD AUTO: 40.3 % (ref 40–54)
HDLC SERPL-MCNC: 51 MG/DL (ref 40–75)
HDLC SERPL: 35.2 % (ref 20–50)
HGB BLD-MCNC: 13.1 G/DL (ref 14–18)
IMM GRANULOCYTES # BLD AUTO: 0.01 K/UL (ref 0–0.04)
IMM GRANULOCYTES NFR BLD AUTO: 0.2 % (ref 0–0.5)
LDLC SERPL CALC-MCNC: 73 MG/DL (ref 63–159)
LYMPHOCYTES # BLD AUTO: 1.4 K/UL (ref 1–4.8)
LYMPHOCYTES NFR BLD: 32.5 % (ref 18–48)
MCH RBC QN AUTO: 31.1 PG (ref 27–31)
MCHC RBC AUTO-ENTMCNC: 32.5 G/DL (ref 32–36)
MCV RBC AUTO: 96 FL (ref 82–98)
MONOCYTES # BLD AUTO: 0.4 K/UL (ref 0.3–1)
MONOCYTES NFR BLD: 9 % (ref 4–15)
NEUTROPHILS # BLD AUTO: 2.3 K/UL (ref 1.8–7.7)
NEUTROPHILS NFR BLD: 52.8 % (ref 38–73)
NONHDLC SERPL-MCNC: 94 MG/DL
NRBC BLD-RTO: 0 /100 WBC
PLATELET # BLD AUTO: 143 K/UL (ref 150–450)
PMV BLD AUTO: 12.4 FL (ref 9.2–12.9)
POTASSIUM SERPL-SCNC: 5 MMOL/L (ref 3.5–5.1)
PROT SERPL-MCNC: 6.9 G/DL (ref 6–8.4)
RBC # BLD AUTO: 4.21 M/UL (ref 4.6–6.2)
SODIUM SERPL-SCNC: 138 MMOL/L (ref 136–145)
TRIGL SERPL-MCNC: 105 MG/DL (ref 30–150)
WBC # BLD AUTO: 4.34 K/UL (ref 3.9–12.7)

## 2024-09-23 PROCEDURE — 36415 COLL VENOUS BLD VENIPUNCTURE: CPT | Mod: HCNC,PN | Performed by: FAMILY MEDICINE

## 2024-09-23 PROCEDURE — 80061 LIPID PANEL: CPT | Mod: HCNC | Performed by: FAMILY MEDICINE

## 2024-09-23 PROCEDURE — 85025 COMPLETE CBC W/AUTO DIFF WBC: CPT | Mod: HCNC | Performed by: FAMILY MEDICINE

## 2024-09-23 PROCEDURE — 80053 COMPREHEN METABOLIC PANEL: CPT | Mod: HCNC | Performed by: FAMILY MEDICINE

## 2024-09-30 ENCOUNTER — OFFICE VISIT (OUTPATIENT)
Dept: FAMILY MEDICINE | Facility: CLINIC | Age: 84
End: 2024-09-30
Payer: MEDICARE

## 2024-09-30 ENCOUNTER — TELEPHONE (OUTPATIENT)
Dept: SLEEP MEDICINE | Facility: HOSPITAL | Age: 84
End: 2024-09-30
Payer: MEDICARE

## 2024-09-30 VITALS
RESPIRATION RATE: 18 BRPM | HEIGHT: 68 IN | OXYGEN SATURATION: 97 % | HEART RATE: 73 BPM | DIASTOLIC BLOOD PRESSURE: 70 MMHG | BODY MASS INDEX: 29.07 KG/M2 | WEIGHT: 191.81 LBS | TEMPERATURE: 98 F | SYSTOLIC BLOOD PRESSURE: 138 MMHG

## 2024-09-30 DIAGNOSIS — R05.8 OTHER COUGH: ICD-10-CM

## 2024-09-30 DIAGNOSIS — F33.0 MDD (MAJOR DEPRESSIVE DISORDER), RECURRENT EPISODE, MILD: Chronic | ICD-10-CM

## 2024-09-30 DIAGNOSIS — F41.1 GENERALIZED ANXIETY DISORDER: Chronic | ICD-10-CM

## 2024-09-30 DIAGNOSIS — R35.1 BENIGN PROSTATIC HYPERPLASIA WITH NOCTURIA: Chronic | ICD-10-CM

## 2024-09-30 DIAGNOSIS — D64.9 NORMOCYTIC ANEMIA: Chronic | ICD-10-CM

## 2024-09-30 DIAGNOSIS — N40.1 BENIGN PROSTATIC HYPERPLASIA WITH NOCTURIA: Chronic | ICD-10-CM

## 2024-09-30 DIAGNOSIS — D69.6 THROMBOCYTOPENIA: Chronic | ICD-10-CM

## 2024-09-30 DIAGNOSIS — M47.816 LUMBAR SPONDYLOSIS: Chronic | ICD-10-CM

## 2024-09-30 DIAGNOSIS — I10 HYPERTENSION, BENIGN: Primary | Chronic | ICD-10-CM

## 2024-09-30 PROCEDURE — G2211 COMPLEX E/M VISIT ADD ON: HCPCS | Mod: HCNC,S$GLB,, | Performed by: FAMILY MEDICINE

## 2024-09-30 PROCEDURE — 1159F MED LIST DOCD IN RCRD: CPT | Mod: HCNC,CPTII,S$GLB, | Performed by: FAMILY MEDICINE

## 2024-09-30 PROCEDURE — 99214 OFFICE O/P EST MOD 30 MIN: CPT | Mod: HCNC,S$GLB,, | Performed by: FAMILY MEDICINE

## 2024-09-30 PROCEDURE — 99999 PR PBB SHADOW E&M-EST. PATIENT-LVL III: CPT | Mod: PBBFAC,HCNC,, | Performed by: FAMILY MEDICINE

## 2024-09-30 PROCEDURE — 3075F SYST BP GE 130 - 139MM HG: CPT | Mod: HCNC,CPTII,S$GLB, | Performed by: FAMILY MEDICINE

## 2024-09-30 PROCEDURE — 3078F DIAST BP <80 MM HG: CPT | Mod: HCNC,CPTII,S$GLB, | Performed by: FAMILY MEDICINE

## 2024-09-30 PROCEDURE — 1160F RVW MEDS BY RX/DR IN RCRD: CPT | Mod: HCNC,CPTII,S$GLB, | Performed by: FAMILY MEDICINE

## 2024-09-30 RX ORDER — CARVEDILOL 12.5 MG/1
12.5 TABLET ORAL 2 TIMES DAILY WITH MEALS
Qty: 180 TABLET | Refills: 3 | Status: SHIPPED | OUTPATIENT
Start: 2024-09-30

## 2024-09-30 RX ORDER — HYDROCODONE BITARTRATE AND ACETAMINOPHEN 7.5; 325 MG/1; MG/1
1 TABLET ORAL EVERY 12 HOURS PRN
COMMUNITY
Start: 2024-09-26

## 2024-09-30 RX ORDER — AMLODIPINE BESYLATE 10 MG/1
10 TABLET ORAL DAILY
Qty: 90 TABLET | Refills: 3 | Status: SHIPPED | OUTPATIENT
Start: 2024-09-30

## 2024-09-30 RX ORDER — TIZANIDINE 2 MG/1
2 TABLET ORAL 2 TIMES DAILY PRN
COMMUNITY
Start: 2024-09-23

## 2024-09-30 RX ORDER — OLMESARTAN MEDOXOMIL 40 MG/1
40 TABLET ORAL DAILY
Qty: 90 TABLET | Refills: 3 | Status: SHIPPED | OUTPATIENT
Start: 2024-09-30

## 2024-09-30 RX ORDER — DOXAZOSIN 4 MG/1
4 TABLET ORAL NIGHTLY
Qty: 90 TABLET | Refills: 3 | Status: SHIPPED | OUTPATIENT
Start: 2024-09-30

## 2024-09-30 RX ORDER — MELOXICAM 15 MG/1
15 TABLET ORAL DAILY
COMMUNITY
Start: 2024-09-23

## 2024-10-01 NOTE — PROGRESS NOTES
"  Patient Name: Javid Daniel    : 1940  MRN: [id number]      Subjective:     Patient ID: Javid is a 84 y.o. male    Chief Complaint:  No chief complaint on file.    History of Present Illness  The patient is an 84-year-old male who presents for routine follow-up on chronic conditions including hypertension, anxiety, BPH and back pain.     He has been seeing Psychiatry for anxiety and depression and only recently saw sleep Medicine who ordered a sleep study for possible obstructive sleep apnea.    He continues to see pain management for chronic back pain and is now prescribed meloxicam by pain management, along with gabapentin and Norco.    He has requested see a pulmonologist for a cough he has had for six months.  He is requesting to see Dr. Ashley, who sees his wife.  He denies any shortness of breath or chest tightness at present.  He denies any upper respiratory symptoms.  He denies any acid reflux.      Review of Systems   Constitutional:  Negative for fever and unexpected weight change.   HENT:  Negative for nasal congestion, postnasal drip, rhinorrhea and sinus pressure/congestion.    Respiratory:  Positive for cough. Negative for shortness of breath and wheezing.    Cardiovascular:  Negative for chest pain and palpitations.   Gastrointestinal:  Negative for abdominal pain and blood in stool.   Genitourinary:  Negative for dysuria.   Musculoskeletal:  Positive for back pain and leg pain.   Neurological:  Negative for dizziness, light-headedness, numbness and headaches.   Hematological:  Does not bruise/bleed easily.   Psychiatric/Behavioral:  Positive for sleep disturbance. Negative for confusion, decreased concentration, dysphoric mood, self-injury and suicidal ideas. The patient is not nervous/anxious.         Objective:   /70 (BP Location: Right arm)   Pulse 73   Temp 97.6 °F (36.4 °C) (Oral)   Resp 18   Ht 5' 8" (1.727 m)   Wt 87 kg (191 lb 12.8 oz)   SpO2 97%   BMI 29.16 kg/m² "     Physical Exam    Physical Exam  Vitals reviewed.   Constitutional:       General: He is not in acute distress.     Appearance: He is well-developed. He is not diaphoretic.      Comments: Using a Rollator for ambulation   HENT:      Head: Normocephalic and atraumatic.      Right Ear: External ear normal.      Left Ear: External ear normal.      Nose: Nose normal.      Mouth/Throat:      Mouth: Mucous membranes are moist.   Eyes:      General:         Right eye: No discharge.         Left eye: No discharge.      Conjunctiva/sclera: Conjunctivae normal.      Pupils: Pupils are equal, round, and reactive to light.   Neck:      Thyroid: No thyromegaly.      Trachea: No tracheal deviation.   Cardiovascular:      Rate and Rhythm: Normal rate and regular rhythm.      Pulses:           Radial pulses are 2+ on the right side and 2+ on the left side.      Heart sounds: Normal heart sounds, S1 normal and S2 normal.   Pulmonary:      Effort: Pulmonary effort is normal. No respiratory distress.      Breath sounds: Normal breath sounds. No wheezing, rhonchi or rales.   Abdominal:      General: Bowel sounds are normal. There is no distension.      Palpations: Abdomen is soft. Abdomen is not rigid. There is no mass.      Tenderness: There is no abdominal tenderness. There is no guarding.   Musculoskeletal:      Cervical back: Normal range of motion and neck supple.   Lymphadenopathy:      Cervical: No cervical adenopathy.   Skin:     General: Skin is warm and dry.      Capillary Refill: Capillary refill takes less than 2 seconds.      Findings: Purpura: various on upper extremities.   Neurological:      Mental Status: He is alert and oriented to person, place, and time.      Cranial Nerves: No cranial nerve deficit.      Sensory: No sensory deficit.      Motor: No atrophy or abnormal muscle tone.      Deep Tendon Reflexes:      Reflex Scores:       Patellar reflexes are 2+ on the right side and 2+ on the left side.  Psychiatric:          Mood and Affect: Mood is not anxious.         Speech: Speech is not tangential.         Behavior: Behavior is not agitated, aggressive (however very confrontational) or hyperactive.         Thought Content: Thought content does not include homicidal or suicidal ideation.       Lab Visit on 09/23/2024   Component Date Value Ref Range Status    Microalbumin, Urine 09/23/2024 10.0  ug/mL Final    Creatinine, Urine 09/23/2024 172.0  23.0 - 375.0 mg/dL Final    Microalb/Creat Ratio 09/23/2024 5.8  0.0 - 30.0 ug/mg Final   Lab Visit on 09/23/2024   Component Date Value Ref Range Status    Sodium 09/23/2024 138  136 - 145 mmol/L Final    Potassium 09/23/2024 5.0  3.5 - 5.1 mmol/L Final    Chloride 09/23/2024 103  95 - 110 mmol/L Final    CO2 09/23/2024 27  23 - 29 mmol/L Final    Glucose 09/23/2024 97  70 - 110 mg/dL Final    BUN 09/23/2024 18  8 - 23 mg/dL Final    Creatinine 09/23/2024 0.9  0.5 - 1.4 mg/dL Final    Calcium 09/23/2024 9.5  8.7 - 10.5 mg/dL Final    Total Protein 09/23/2024 6.9  6.0 - 8.4 g/dL Final    Albumin 09/23/2024 3.9  3.5 - 5.2 g/dL Final    Total Bilirubin 09/23/2024 0.9  0.1 - 1.0 mg/dL Final    Comment: For infants and newborns, interpretation of results should be based  on gestational age, weight and in agreement with clinical  observations.    Premature Infant recommended reference ranges:  Up to 24 hours.............<8.0 mg/dL  Up to 48 hours............<12.0 mg/dL  3-5 days..................<15.0 mg/dL  6-29 days.................<15.0 mg/dL      Alkaline Phosphatase 09/23/2024 56  55 - 135 U/L Final    AST 09/23/2024 16  10 - 40 U/L Final    ALT 09/23/2024 9 (L)  10 - 44 U/L Final    eGFR 09/23/2024 >60  >60 mL/min/1.73 m^2 Final    Anion Gap 09/23/2024 8  8 - 16 mmol/L Final    Cholesterol 09/23/2024 145  120 - 199 mg/dL Final    Comment: The National Cholesterol Education Program (NCEP) has set the  following guidelines (reference ranges) for  Cholesterol:  Optimal.....................<200 mg/dL  Borderline High.............200-239 mg/dL  High........................> or = 240 mg/dL      Triglycerides 09/23/2024 105  30 - 150 mg/dL Final    Comment: The National Cholesterol Education Program (NCEP) has set the  following guidelines (reference values) for triglycerides:  Normal......................<150 mg/dL  Borderline High.............150-199 mg/dL  High........................200-499 mg/dL      HDL 09/23/2024 51  40 - 75 mg/dL Final    Comment: The National Cholesterol Education Program (NCEP) has set the  following guidelines (reference values) for HDL Cholesterol:  Low...............<40 mg/dL  Optimal...........>60 mg/dL      LDL Cholesterol 09/23/2024 73.0  63.0 - 159.0 mg/dL Final    Comment: The National Cholesterol Education Program (NCEP) has set the  following guidelines (reference values) for LDL Cholesterol:  Optimal.......................<130 mg/dL  Borderline High...............130-159 mg/dL  High..........................160-189 mg/dL  Very High.....................>190 mg/dL      HDL/Cholesterol Ratio 09/23/2024 35.2  20.0 - 50.0 % Final    Total Cholesterol/HDL Ratio 09/23/2024 2.8  2.0 - 5.0 Final    Non-HDL Cholesterol 09/23/2024 94  mg/dL Final    Comment: Risk category and Non-HDL cholesterol goals:  Coronary heart disease (CHD)or equivalent (10-year risk of CHD >20%):  Non-HDL cholesterol goal     <130 mg/dL  Two or more CHD risk factors and 10-year risk of CHD <= 20%:  Non-HDL cholesterol goal     <160 mg/dL  0 to 1 CHD risk factor:  Non-HDL cholesterol goal     <190 mg/dL      WBC 09/23/2024 4.34  3.90 - 12.70 K/uL Final    RBC 09/23/2024 4.21 (L)  4.60 - 6.20 M/uL Final    Hemoglobin 09/23/2024 13.1 (L)  14.0 - 18.0 g/dL Final    Hematocrit 09/23/2024 40.3  40.0 - 54.0 % Final    MCV 09/23/2024 96  82 - 98 fL Final    MCH 09/23/2024 31.1 (H)  27.0 - 31.0 pg Final    MCHC 09/23/2024 32.5  32.0 - 36.0 g/dL Final    RDW 09/23/2024  13.1  11.5 - 14.5 % Final    Platelets 09/23/2024 143 (L)  150 - 450 K/uL Final    MPV 09/23/2024 12.4  9.2 - 12.9 fL Final    Immature Granulocytes 09/23/2024 0.2  0.0 - 0.5 % Final    Gran # (ANC) 09/23/2024 2.3  1.8 - 7.7 K/uL Final    Immature Grans (Abs) 09/23/2024 0.01  0.00 - 0.04 K/uL Final    Comment: Mild elevation in immature granulocytes is non specific and   can be seen in a variety of conditions including stress response,   acute inflammation, trauma and pregnancy. Correlation with other   laboratory and clinical findings is essential.      Lymph # 09/23/2024 1.4  1.0 - 4.8 K/uL Final    Mono # 09/23/2024 0.4  0.3 - 1.0 K/uL Final    Eos # 09/23/2024 0.2  0.0 - 0.5 K/uL Final    Baso # 09/23/2024 0.03  0.00 - 0.20 K/uL Final    nRBC 09/23/2024 0  0 /100 WBC Final    Gran % 09/23/2024 52.8  38.0 - 73.0 % Final    Lymph % 09/23/2024 32.5  18.0 - 48.0 % Final    Mono % 09/23/2024 9.0  4.0 - 15.0 % Final    Eosinophil % 09/23/2024 4.8  0.0 - 8.0 % Final    Basophil % 09/23/2024 0.7  0.0 - 1.9 % Final    Differential Method 09/23/2024 Automated   Final       Assessment        ICD-10-CM ICD-9-CM   1. Hypertension, benign  I10 401.1   2. Benign prostatic hyperplasia with nocturia  N40.1 600.01    R35.1 788.43   3. Other cough  R05.8 786.2   4. Thrombocytopenia  D69.6 287.5   5. Normocytic anemia  D64.9 285.9   6. Lumbar spondylosis  M47.816 721.3   7. Generalized anxiety disorder  F41.1 300.02   8. MDD (major depressive disorder), recurrent episode, mild  F33.0 296.31         Plan:     Assessment & Plan  1. Hypertension.  He is advised to continue his current medication regimen, including amlodipine. A prescription refill for amlodipine will be provided.    2. Benign Prostatic Hyperplasia (BPH).  He is advised to continue his current medication regimen. A prescription refill will be provided.    3. Cough  Requested referral to pulmonology placed.    4.Thrombocytopenia/ Anemia  No signs of bleeding reported.  Continue monitoring blood count    5. Back Pain.  He will continue with recommendations from his pain management provider    6. Anxiety/ Depression  Continue with recommendations from Psychiatry     ORDERS  1. Hypertension, benign  Overview:  Fair blood pressure control.  Continue current regimen.    Orders:  -     olmesartan (BENICAR) 40 MG tablet; Take 1 tablet (40 mg total) by mouth once daily.  Dispense: 90 tablet; Refill: 3  -     doxazosin (CARDURA) 4 MG tablet; Take 1 tablet (4 mg total) by mouth every evening.  Dispense: 90 tablet; Refill: 3  -     amLODIPine (NORVASC) 10 MG tablet; Take 1 tablet (10 mg total) by mouth once daily.  Dispense: 90 tablet; Refill: 3  -     carvediloL (COREG) 12.5 MG tablet; Take 1 tablet (12.5 mg total) by mouth 2 (two) times daily with meals.  Dispense: 180 tablet; Refill: 3  -     Comprehensive Metabolic Panel; Future; Expected date: 03/30/2025  -     CBC Auto Differential; Future; Expected date: 03/30/2025  -     Lipid Panel; Future; Expected date: 03/30/2025    2. Benign prostatic hyperplasia with nocturia  -     doxazosin (CARDURA) 4 MG tablet; Take 1 tablet (4 mg total) by mouth every evening.  Dispense: 90 tablet; Refill: 3    3. Other cough  -     Ambulatory referral/consult to Pulmonology; Future; Expected date: 10/07/2024    4. Thrombocytopenia  -     CBC Auto Differential; Future; Expected date: 03/30/2025    5. Normocytic anemia  -     CBC Auto Differential; Future; Expected date: 03/30/2025  -     Iron and TIBC; Future; Expected date: 03/30/2025  -     Ferritin; Future; Expected date: 03/30/2025    6. Lumbar spondylosis    7. Generalized anxiety disorder    8. MDD (major depressive disorder), recurrent episode, mild             -Maxx Mcintosh Jr., MD, AAHIVS      This note was generated with the assistance of ambient listening technology. Verbal consent was obtained by the patient and accompanying visitor(s) for the recording of patient appointment to facilitate  this note. I attest to having reviewed and edited the generated note for accuracy, though some syntax or spelling errors may persist. Please contact the author of this note for any clarification.          There are no Patient Instructions on file for this visit.    Follow Up  Follow up in about 6 months (around 3/30/2025), or Annual, HTN, BPH, Anemia (fasting labs a week prior).    Future Appointments   Date Time Provider Department Center   10/10/2024  7:30 PM LAB, [facility]   3/24/2025  9:00 AM LAB, [facility] D[facility] STATION [facility]   3/31/2025 10:40 AM Maxx Mcintosh Jr., MD [facility]

## 2024-10-09 ENCOUNTER — OFFICE VISIT (OUTPATIENT)
Dept: FAMILY MEDICINE | Facility: CLINIC | Age: 84
End: 2024-10-09
Payer: MEDICARE

## 2024-10-09 VITALS
SYSTOLIC BLOOD PRESSURE: 138 MMHG | TEMPERATURE: 98 F | WEIGHT: 212.75 LBS | DIASTOLIC BLOOD PRESSURE: 80 MMHG | BODY MASS INDEX: 32.24 KG/M2 | HEART RATE: 85 BPM | OXYGEN SATURATION: 98 % | HEIGHT: 68 IN | RESPIRATION RATE: 18 BRPM

## 2024-10-09 DIAGNOSIS — F11.20 OPIOID DEPENDENCE WITH CURRENT USE: Chronic | ICD-10-CM

## 2024-10-09 DIAGNOSIS — F33.0 MDD (MAJOR DEPRESSIVE DISORDER), RECURRENT EPISODE, MILD: Chronic | ICD-10-CM

## 2024-10-09 DIAGNOSIS — Z00.00 ENCOUNTER FOR PREVENTIVE HEALTH EXAMINATION: Primary | ICD-10-CM

## 2024-10-09 DIAGNOSIS — H91.90 HEARING LOSS, UNSPECIFIED HEARING LOSS TYPE, UNSPECIFIED LATERALITY: ICD-10-CM

## 2024-10-09 DIAGNOSIS — D69.6 THROMBOCYTOPENIA: Chronic | ICD-10-CM

## 2024-10-09 PROCEDURE — 1170F FXNL STATUS ASSESSED: CPT | Mod: HCNC,CPTII,S$GLB, | Performed by: NURSE PRACTITIONER

## 2024-10-09 PROCEDURE — 1125F AMNT PAIN NOTED PAIN PRSNT: CPT | Mod: HCNC,CPTII,S$GLB, | Performed by: NURSE PRACTITIONER

## 2024-10-09 PROCEDURE — 3079F DIAST BP 80-89 MM HG: CPT | Mod: HCNC,CPTII,S$GLB, | Performed by: NURSE PRACTITIONER

## 2024-10-09 PROCEDURE — 3075F SYST BP GE 130 - 139MM HG: CPT | Mod: HCNC,CPTII,S$GLB, | Performed by: NURSE PRACTITIONER

## 2024-10-09 PROCEDURE — 1158F ADVNC CARE PLAN TLK DOCD: CPT | Mod: HCNC,CPTII,S$GLB, | Performed by: NURSE PRACTITIONER

## 2024-10-09 PROCEDURE — G0439 PPPS, SUBSEQ VISIT: HCPCS | Mod: HCNC,S$GLB,, | Performed by: NURSE PRACTITIONER

## 2024-10-09 PROCEDURE — 3288F FALL RISK ASSESSMENT DOCD: CPT | Mod: HCNC,CPTII,S$GLB, | Performed by: NURSE PRACTITIONER

## 2024-10-09 PROCEDURE — 1101F PT FALLS ASSESS-DOCD LE1/YR: CPT | Mod: HCNC,CPTII,S$GLB, | Performed by: NURSE PRACTITIONER

## 2024-10-09 PROCEDURE — 99999 PR PBB SHADOW E&M-EST. PATIENT-LVL V: CPT | Mod: PBBFAC,HCNC,, | Performed by: NURSE PRACTITIONER

## 2024-10-09 PROCEDURE — 1160F RVW MEDS BY RX/DR IN RCRD: CPT | Mod: HCNC,CPTII,S$GLB, | Performed by: NURSE PRACTITIONER

## 2024-10-09 PROCEDURE — G9919 SCRN ND POS ND PROV OF REC: HCPCS | Mod: HCNC,CPTII,S$GLB, | Performed by: NURSE PRACTITIONER

## 2024-10-09 PROCEDURE — 1159F MED LIST DOCD IN RCRD: CPT | Mod: HCNC,CPTII,S$GLB, | Performed by: NURSE PRACTITIONER

## 2024-10-09 NOTE — PATIENT INSTRUCTIONS
Counseling and Referral of Other Preventative  (Italic type indicates deductible and co-insurance are waived)    Patient Name: Javid Daniel  Today's Date: 10/9/2024    Health Maintenance       Date Due Completion Date    RSV Vaccine (Age 60+ and Pregnant patients) (1 - 1-dose 75+ series) Never done ---    Lipid Panel 09/23/2029 9/23/2024    TETANUS VACCINE 03/03/2030 3/3/2020        No orders of the defined types were placed in this encounter.      The following information is provided to all patients.  This information is to help you find resources for any of the problems found today that may be affecting your health:                  Living healthy guide: www.[facility].[place].[facility]      Understanding Diabetes: www.diabetes.org      Eating healthy: www.cdc.gov/healthyweight      CDC home safety checklist: www.cdc.gov/steadi/patient.html      Agency on Aging: www.goea.[place].[facility]      Alcoholics anonymous (AA): www.aa.org      Physical Activity: www.delvin.nih.gov/cc3eyxr      Tobacco use: www.quitwithusla.org

## 2024-10-09 NOTE — PROGRESS NOTES
"  Javid Daniel presented for a  Medicare AWV and comprehensive Health Risk Assessment today. The following components were reviewed and updated:    Medical history  Family History  Social history  Allergies and Current Medications  Health Risk Assessment  Health Maintenance  Care Team         ** See Completed Assessments for Annual Wellness Visit within the encounter summary.**         The following assessments were completed:  Living Situation  CAGE  Depression Screening  Timed Get Up and Go  Whisper Test  Cognitive Function Screening  Nutrition Screening  ADL Screening  PAQ Screening      Opioid documentation:      Patient does have a current opioid prescription.      Patient accepted further discussion regarding opioid medication use.      Patient is currently taking hydrocodone narcotic for back pain.        Pain level today is 5/10.       In addition to narcotic pain medications, patient is also using acetaminophen for pain control.       Patient is followed by a specialist currently for their pain and will not be referred today.       Patient's opioid risk potential based on ORT-OUD tool:       Aguilar each box that applies   No   Yes     Family history of substance abuse   Alcohol [x] []   Illegal drugs [x] []   Rx drugs [x] []     Personal history of substance abuse   Alcohol [x] []   Illegal drugs [x] []   Rx drugs [x] []     Age between 16-45 years   [x]   []     Patient with ADD, OCD, Bipolar disorder, schizoprenia   [x]   []     Patient with depression   []   [x]                         Scoring total                                                        1         Non-opioid treatment options have been discussed today and added to the patient's after visit summary.        Vitals:    10/09/24 1332   BP: 138/80   BP Location: Right arm   Patient Position: Sitting   Pulse: 85   Resp: 18   Temp: 98.2 °F (36.8 °C)   TempSrc: Oral   SpO2: 98%   Weight: 96.5 kg (212 lb 11.9 oz)   Height: 5' 8" (1.727 m)     Body " mass index is 32.35 kg/m².  Physical Exam  Vitals reviewed.   Constitutional:       General: He is not in acute distress.     Appearance: Normal appearance. He is not ill-appearing, toxic-appearing or diaphoretic.   HENT:      Head: Normocephalic and atraumatic.   Pulmonary:      Effort: Pulmonary effort is normal. No respiratory distress.      Breath sounds: No wheezing.   Skin:     General: Skin is warm and dry.   Neurological:      Mental Status: He is alert and oriented to person, place, and time.   Psychiatric:         Mood and Affect: Mood normal.         Behavior: Behavior normal.               Diagnoses and health risks identified today and associated recommendations/orders:    1. Encounter for preventive health examination  The patient was seen today for an annual Medicare wellness exam.  Health maintenance and screening topics were discussed.  Proper diet and exercise recommendations were reviewed.    2. MDD (major depressive disorder), recurrent episode, mild  Stable.  Without any acute worsening.    3. Thrombocytopenia  Most recent platelet count stable in the 140s.  No acute concerns.    4. Opioid dependence with current use  Patient takes pain medication followed by pain clinic.  Opioid use disorder screening as above.    5. Hearing loss, unspecified hearing loss type, unspecified laterality  Patient does have some hearing loss.  Will refer to audiology and ENT for evaluation.  - Ambulatory referral/consult to Audiology; Future  - Ambulatory referral/consult to ENT; Future    Of note, patient declined  today electing for his son who is with him to translate for him.    Provided Javid with a 5-10 year written screening schedule and personal prevention plan. Recommendations were developed using the USPSTF age appropriate recommendations. Education, counseling, and referrals were provided as needed. After Visit Summary printed and given to patient which includes a list of additional  screenings\tests needed.    Follow up in about 1 year (around 10/9/2025) for AWV.    Danny Kong, NP  I offered to discuss advanced care planning, including how to pick a person who would make decisions for you if you were unable to make them for yourself, called a health care power of , and what kind of decisions you might make such as use of life sustaining treatments such as ventilators and tube feeding when faced with a life limiting illness recorded on a living will that they will need to know. (How you want to be cared for as you near the end of your natural life)     X Patient is interested in learning more about how to make advanced directives.  I provided them paperwork and offered to discuss this with them.

## 2024-10-10 ENCOUNTER — HOSPITAL ENCOUNTER (OUTPATIENT)
Dept: SLEEP MEDICINE | Facility: HOSPITAL | Age: 84
Discharge: HOME | End: 2024-10-10
Attending: PHYSICIAN ASSISTANT
Payer: MEDICARE

## 2024-10-10 DIAGNOSIS — R06.81 WITNESSED EPISODE OF APNEA: ICD-10-CM

## 2024-10-10 DIAGNOSIS — R06.83 SNORING: ICD-10-CM

## 2024-10-10 DIAGNOSIS — F51.09 OTHER INSOMNIA NOT DUE TO A SUBSTANCE OR KNOWN PHYSIOLOGICAL CONDITION: ICD-10-CM

## 2024-10-10 DIAGNOSIS — G47.10 HYPERSOMNOLENCE: ICD-10-CM

## 2024-10-10 DIAGNOSIS — R35.1 NOCTURIA: ICD-10-CM

## 2024-10-10 PROCEDURE — 95811 POLYSOM 6/>YRS CPAP 4/> PARM: CPT | Mod: HCNC

## 2024-10-11 PROBLEM — R06.81 WITNESSED EPISODE OF APNEA: Status: ACTIVE | Noted: 2024-10-11

## 2024-10-11 NOTE — PROGRESS NOTES
End of the night summary      Type of study performed on (Javid Daniel-)  Split  ?  Patient education/cpap information prior to study/setup     Pt was informed, of emergency cord in bathroom and given spectra link phone#     EKG Appears to be- NSR w PACs     Low spo2 -  82%     Any difficulties recording: Mouth leaks in REM     Optimal pressure# 15 or 16     MASK:  nasal eson MED     Pt reaction to CPAP:  pt son reports that patient will try it for the study     Tech summary Comment    pt met criteria for split on cpap. soft to moderate snoring observed. pt has frequent events observed supine and in REM sleep. pt observed titrated on cpap, pt tolerated cpap pressure well, optimal pressure observed. pt has some mouth leaks observed in REM sleep. pt slept okay no reports of disocmfort, pt has thais at bedside

## 2024-10-22 ENCOUNTER — PATIENT MESSAGE (OUTPATIENT)
Dept: SLEEP MEDICINE | Facility: CLINIC | Age: 84
End: 2024-10-22
Payer: MEDICARE

## 2024-10-22 DIAGNOSIS — G47.33 OSA (OBSTRUCTIVE SLEEP APNEA): Primary | ICD-10-CM

## 2024-10-29 ENCOUNTER — TELEPHONE (OUTPATIENT)
Dept: SLEEP MEDICINE | Facility: CLINIC | Age: 84
End: 2024-10-29
Payer: MEDICARE

## 2024-11-06 DIAGNOSIS — K21.9 GASTROESOPHAGEAL REFLUX DISEASE WITHOUT ESOPHAGITIS: ICD-10-CM

## 2024-11-06 RX ORDER — PANTOPRAZOLE SODIUM 40 MG/1
40 TABLET, DELAYED RELEASE ORAL DAILY
Qty: 90 TABLET | Refills: 3 | Status: SHIPPED | OUTPATIENT
Start: 2024-11-06 | End: 2025-11-06

## 2024-11-06 NOTE — TELEPHONE ENCOUNTER
No care due was identified.  Health [facility] Embedded Care Due Messages. Reference number: [id number].   11/06/2024 2:14:51 PM CST

## 2024-11-06 NOTE — TELEPHONE ENCOUNTER
Refill Decision Note   Javid Daniel  is requesting a refill authorization.  Brief Assessment and Rationale for Refill:  Approve     Medication Therapy Plan:         Comments:     Note composed:3:16 PM 11/06/2024             Appointments     Last Visit   9/30/2024 Maxx Mcintosh Jr., MD   Next Visit   3/31/2025 Maxx Mcintosh Jr., MD

## 2024-11-06 NOTE — TELEPHONE ENCOUNTER
----- Message from Med Assistant Armendariz sent at 11/6/2024  1:12 PM CST -----  Type: RX Refill Request    Who Called:  Daughter Jimena   Have you contacted your pharmacy:    Refill    RX Name and Strength:  pantoprazole (PROTONIX) 40 MG tablet  Preferred Pharmacy with phone number:    [facility] #[id number] - [place] - [address] AT [facility] [address]  [address]  [place] [address]  Phone: [phone] Fax: [phone]    Local or Mail Order:  Local   Would the patient rather a call back or a response via My [facility]?    Best Call Back Number:  Telephone Information:  Mobile          [phone]     Additional Information:    Thank you.

## 2024-11-29 DIAGNOSIS — F41.1 GAD (GENERALIZED ANXIETY DISORDER): ICD-10-CM

## 2024-11-29 DIAGNOSIS — J31.0 CHRONIC RHINITIS: ICD-10-CM

## 2024-11-29 RX ORDER — BUSPIRONE HYDROCHLORIDE 15 MG/1
15 TABLET ORAL 3 TIMES DAILY
Qty: 270 TABLET | Refills: 0 | Status: SHIPPED | OUTPATIENT
Start: 2024-11-29

## 2024-11-29 RX ORDER — FLUTICASONE PROPIONATE 50 MCG
SPRAY, SUSPENSION (ML) NASAL
Qty: 16 G | Refills: 0 | Status: SHIPPED | OUTPATIENT
Start: 2024-11-29

## 2024-11-29 NOTE — TELEPHONE ENCOUNTER
No care due was identified.  [facility] Embedded Care Due Messages. Reference number: [id number].   11/29/2024 1:36:48 PM CST

## 2024-11-29 NOTE — TELEPHONE ENCOUNTER
----- Message from Gilda sent at 11/29/2024  1:12 PM CST -----  Regarding: Daughter  Who Called: daughter    Have you contacted your pharmacy:    Refill fluticasone propionate (FLONASE) 50 mcg/actuation nasal spray  busPIRone (BUSPAR) 15 MG tablet    RX Name and Strength:    Preferred Pharmacy with phone number:   [facility] #[id number] - [place] - [address] AT [facility] [address]  [address]  [place] [address]  Phone: [phone] Fax: [phone]      Local or Mail Order: local    Would the patient rather a call back or a response via My [facility]?     Best Call Back Number: [phone] Velia

## 2024-12-02 ENCOUNTER — PATIENT OUTREACH (OUTPATIENT)
Dept: ADMINISTRATIVE | Facility: HOSPITAL | Age: 84
End: 2024-12-02
Payer: MEDICARE

## 2024-12-07 DIAGNOSIS — F33.0 MDD (MAJOR DEPRESSIVE DISORDER), RECURRENT EPISODE, MILD: ICD-10-CM

## 2024-12-07 DIAGNOSIS — F41.1 GAD (GENERALIZED ANXIETY DISORDER): ICD-10-CM

## 2024-12-10 RX ORDER — DULOXETIN HYDROCHLORIDE 20 MG/1
20 CAPSULE, DELAYED RELEASE ORAL
Qty: 90 CAPSULE | Refills: 0 | OUTPATIENT
Start: 2024-12-10

## 2024-12-12 ENCOUNTER — CLINICAL SUPPORT (OUTPATIENT)
Dept: AUDIOLOGY | Facility: CLINIC | Age: 84
End: 2024-12-12
Payer: MEDICARE

## 2024-12-12 ENCOUNTER — OFFICE VISIT (OUTPATIENT)
Dept: OTOLARYNGOLOGY | Facility: CLINIC | Age: 84
End: 2024-12-12
Payer: MEDICARE

## 2024-12-12 VITALS
BODY MASS INDEX: 32.13 KG/M2 | DIASTOLIC BLOOD PRESSURE: 80 MMHG | WEIGHT: 212 LBS | SYSTOLIC BLOOD PRESSURE: 130 MMHG | HEIGHT: 68 IN

## 2024-12-12 DIAGNOSIS — H90.3 SENSORINEURAL HEARING LOSS (SNHL) OF BOTH EARS: Primary | ICD-10-CM

## 2024-12-12 DIAGNOSIS — H93.13 TINNITUS OF BOTH EARS: ICD-10-CM

## 2024-12-12 DIAGNOSIS — H90.3 SENSORINEURAL HEARING LOSS (SNHL), BILATERAL: Primary | ICD-10-CM

## 2024-12-12 DIAGNOSIS — H91.90 HEARING LOSS, UNSPECIFIED HEARING LOSS TYPE, UNSPECIFIED LATERALITY: ICD-10-CM

## 2024-12-12 PROCEDURE — 92567 TYMPANOMETRY: CPT | Mod: 52,S$GLB,,

## 2024-12-12 PROCEDURE — 92553 AUDIOMETRY AIR & BONE: CPT | Mod: S$GLB,,,

## 2024-12-12 NOTE — PROGRESS NOTES
OTOLARYNGOLOGY CLINIC NOTE  Date:  2024     Chief complaint:  Chief Complaint   Patient presents with    Hearing Loss     History of Present Illness  Javid Daniel is a 84 y.o. male  presenting today for a new evaluation and treatment of hearing loss.  Pt reports decreased hearing over several years.  Pt is starting to have problems understanding and hearing what is being told to him.  Pt denies any otalgia, otorrhea or vertigo.  Pt has some nasal congestion at times but takes Flonase when that occurs and feels better afterward.     Past Medical History  Past Medical History:   Diagnosis Date    Anxiety     GERD (gastroesophageal reflux disease)     Hypertension     Nuclear sclerosis of both eyes 2021    Primary osteoarthritis of both knees 2023    Type 2 diabetes mellitus without complication, unspecified whether long term insulin use 2024      Past Surgical History  No past surgical history on file.     Medications  Current Outpatient Medications on File Prior to Visit   Medication Sig Dispense Refill    acetaminophen (TYLENOL) 325 MG tablet Take 325 mg by mouth every 6 (six) hours as needed for Pain.      albuterol (PROVENTIL/VENTOLIN HFA) 90 mcg/actuation inhaler SMARTSI Puff(s) Via Inhaler Every 6 Hours PRN      amLODIPine (NORVASC) 10 MG tablet Take 1 tablet (10 mg total) by mouth once daily. 90 tablet 3    benzonatate (TESSALON) 100 MG capsule Take 100 mg by mouth 3 (three) times daily.      busPIRone (BUSPAR) 15 MG tablet Take 1 tablet (15 mg total) by mouth 3 (three) times daily. 270 tablet 0    carvediloL (COREG) 12.5 MG tablet Take 1 tablet (12.5 mg total) by mouth 2 (two) times daily with meals. 180 tablet 3    chlorhexidine (PERIDEX) 0.12 % solution SMARTSIG:By Mouth      COMIRNATY -, 12Y UP,,PF, 30 mcg/0.3 mL injection       diclofenac sodium (VOLTAREN) 1 % Gel APPLY 3 TO 4 GRAMS TO THE AFFECTED AREA OF THE SKIN TWICE DAILY 100 g 12    doxazosin (CARDURA) 4 MG  tablet Take 1 tablet (4 mg total) by mouth every evening. 90 tablet 3    DULoxetine (CYMBALTA) 20 MG capsule TAKE 1 CAPSULE(20 MG) BY MOUTH EVERY DAY 90 capsule 0    fluticasone propionate (FLONASE) 50 mcg/actuation nasal spray SHAKE LIQUID AND USE 2 SPRAYS(100 MCG) IN EACH NOSTRIL EVERY DAY 16 g 0    gabapentin (NEURONTIN) 600 MG tablet 1 in the morning, 1 in the afternoon, 2 in the evening 360 tablet 3    HYDROcodone-acetaminophen (NORCO) 7.5-325 mg per tablet Take 1 tablet by mouth every 12 (twelve) hours as needed.      levocetirizine (XYZAL) 5 MG tablet Take 1 tablet (5 mg total) by mouth every evening. 90 tablet 1    meloxicam (MOBIC) 15 MG tablet Take 15 mg by mouth once daily.      olmesartan (BENICAR) 40 MG tablet Take 1 tablet (40 mg total) by mouth once daily. 90 tablet 3    pantoprazole (PROTONIX) 40 MG tablet Take 1 tablet (40 mg total) by mouth once daily. 90 tablet 3    tiZANidine (ZANAFLEX) 2 MG tablet Take 2 mg by mouth 2 (two) times daily as needed (back pain).      cetirizine (ZYRTEC) 10 MG tablet Take 1 tablet by mouth once daily.      mirtazapine (REMERON) 15 MG tablet Take 1 tablet (15 mg total) by mouth every evening. 90 tablet 2     No current facility-administered medications on file prior to visit.     Review of Systems  Review of Systems   Constitutional: Negative.    HENT:  Positive for hearing loss.    Eyes:  Positive for photophobia.   Respiratory:  Positive for shortness of breath and wheezing.    Cardiovascular: Negative.    Musculoskeletal:  Positive for back pain and neck pain.   Skin: Negative.    Neurological: Negative.    Psychiatric/Behavioral:  The patient is nervous/anxious.       Social History   reports that he has never smoked. He has never used smokeless tobacco. He reports that he does not currently use alcohol. He reports that he does not use drugs.     Family History  Family History   Problem Relation Name Age of Onset    No Known Problems Mother      No Known Problems  "Father      No Known Problems Sister      No Known Problems Brother      No Known Problems Maternal Aunt      No Known Problems Maternal Uncle      No Known Problems Paternal Aunt      No Known Problems Paternal Uncle      No Known Problems Maternal Grandmother      No Known Problems Maternal Grandfather      No Known Problems Paternal Grandmother      No Known Problems Paternal Grandfather      No Known Problems Other      Amblyopia Neg Hx      Blindness Neg Hx      Cancer Neg Hx      Cataracts Neg Hx      Diabetes Neg Hx      Glaucoma Neg Hx      Hypertension Neg Hx      Macular degeneration Neg Hx      Retinal detachment Neg Hx      Strabismus Neg Hx      Stroke Neg Hx      Thyroid disease Neg Hx      Colon cancer Neg Hx      Esophageal cancer Neg Hx        Physical Exam   Vitals:    12/12/24 0948   BP: 130/80    Body mass index is 32.23 kg/m².  Weight: 96.2 kg (211 lb 15.6 oz)   Height: 5' 8" (172.7 cm)     GENERAL: no acute distress.  HEAD: normocephalic.   EYES: lids and lashes normal. No scleral icterus  EARS: external ear without lesion, normal pinna shape and position.  External auditory canal with normal cerumen, tympanic membrane fully visible, no perforation , no retraction. No middle ear effusion. Ossicles intact.   NOSE: external nose without significant bony abnormality  ORAL CAVITY/OROPHARYNX: tongue midline and mobile. Symmetric palate rise. Uvula midline.   NECK: trachea midline.   LYMPH NODES:No cervical lymphadenopathy.  RESPIRATORY: no stridor, no stertor. Voice normal. Respirations nonlabored.  NEURO: alert, responds to questions appropriately.   PSYCH:mood appropriate    Imaging:  The patient does not have any pertinent and/or recent imaging of the head and neck.     Labs:  CBC  Recent Labs   Lab 03/15/24  0820 07/02/24  0910 09/23/24  0830   WBC 4.90 3.99 4.34   Hemoglobin 12.6 L 12.0 L 13.1 L   Hematocrit 38.5 L 38.3 L 40.3   MCV 95 95 96   Platelets 149 L 129 L 143 L     BMP  Recent Labs "   Lab 03/15/24  0820 07/02/24  0910 09/23/24  0830   Glucose 99 100 97   Sodium 137 142 138   Potassium 5.2 H 4.8 5.0   Chloride 101 106 103   CO2 28 29 27   BUN 18 15 18   Creatinine 0.9 0.9 0.9   Calcium 9.2 9.7 9.5     COAGS  Recent Labs   Lab 11/28/23  1500   INR 1.0         AUDIOLOGY RESULTS  Audiometric evaluation including audiogram, tympanometry, acoustic reflexes, and speech discrimination which was performed  was personally reviewed and interpreted.  Notable findings on the audiogram were mild to severely-profound sensorineural hearing loss (SNHL) bilaterally.  Tympanometry revealed Type A tympanogram on the left and not performed on the right.  Speech discrimination was not performed. Report of the audiologist performing this audiometric testing was also reviewed.      Assessment  1. Sensorineural hearing loss (SNHL), bilateral    2. Tinnitus of both ears     Plan:  SNHL:  Audiogram reviewed and discussed with patient. Recheck hearing in 1 year or sooner if subjective change noted. Encouraged hearing protection while in noisy environment.  Additionally, amplification with hearing aids is generally the best option for hearing rehabilitation, except where the hearing loss is profound.  Tinnitus:  Pt advised there is no cure for condition but methods to help control. Pt advised to avoid caffeine, alcohol, tobacco and stress.  Pt advised hearing aids sometimes help with tinnitus.  Pt can also try using white noise machine in quiet environment. Pt advised to wear hearing protection in noisy environment.  F/u prn  Discussed plan of care with patient in detail and all questions answered. Patient reported understanding of plan of care.

## 2024-12-13 NOTE — PROGRESS NOTES
Please click on link to view Audiogram:  Document on 12/12/2024 9:41 AM by Tamie Pantoja AU.D: Audiogram    Mr. Javid Daniel, a 84 y.o. male, was seen in the clinic today for an audiological evaluation.     Otoscopy clear bilaterally. Tympanometry testing revealed a Type A tympanogram for the left ear. Tympanometry testing was attempted for the right ear; hermetic seal could not be maintained to obtain results.     Audiological testing revealed a mild sloping to severe sensorineural hearing loss (SNHL) bilaterally. Could not test for speech reception thresholds and speech discrimination due to language barrier.    Recommendations:  1. Otologic evaluation  2. Annual audiological evaluation, sooner if medically necessary or if hearing changes  3. Hearing protection when in noise   4. Hearing aid consultation

## 2024-12-24 ENCOUNTER — TELEPHONE (OUTPATIENT)
Dept: PULMONOLOGY | Facility: CLINIC | Age: 84
End: 2024-12-24
Payer: MEDICARE

## 2024-12-24 DIAGNOSIS — R05.9 COUGH, UNSPECIFIED TYPE: Primary | ICD-10-CM

## 2024-12-24 NOTE — TELEPHONE ENCOUNTER
Spoke with patient, informed him that I'm contacting him in regards to scheduling his Pulmonary Function Test prior to his appointment with Dr Miranda. Pt verbalized that he understand and states that he will call me back after he speaks with his father.

## 2024-12-26 DIAGNOSIS — J31.0 CHRONIC RHINITIS: ICD-10-CM

## 2024-12-26 NOTE — TELEPHONE ENCOUNTER
No care due was identified.  [facility] Embedded Care Due Messages. Reference number: [id number].   12/26/2024 9:54:27 AM CST

## 2024-12-26 NOTE — TELEPHONE ENCOUNTER
Refill Routing Note   Medication(s) are not appropriate for processing by [facility] for the following reason(s):        No active prescription written by provider    [facility] action(s):  Defer               Appointments  past 12m or future 3m with PCP    Date Provider   Last Visit   9/30/2024 Maxx Mcintosh Jr., MD   Next Visit   3/31/2025 Maxx Mcintosh Jr., MD   ED visits in past 90 days: 0        Note composed:5:41 PM 12/26/2024

## 2024-12-26 NOTE — TELEPHONE ENCOUNTER
----- Message from Papito sent at 12/24/2024 12:01 PM CST -----  Regarding: Daughter michael   Type: RX Refill Request    Who Called: Daughter     Have you contacted your pharmacy: yes     Refill    RX Name and Strength:fluticasone propionate (FLONASE) 50 mcg/actuation nasal spray     Preferred Pharmacy with phone number: .    [facility] #[id number]  [place] - [address] AT [facility] [address]  [address]  [place] [address]  Phone: [phone] Fax: [phone]          Local or Mail Order: local     Would the patient rather a call back or a response via [facility]  call back     Best Call Back Number:.[phone]      Additional Information:     Thank you.

## 2024-12-29 RX ORDER — FLUTICASONE PROPIONATE 50 MCG
SPRAY, SUSPENSION (ML) NASAL
Qty: 48 G | Refills: 3 | Status: SHIPPED | OUTPATIENT
Start: 2024-12-29

## 2025-01-28 ENCOUNTER — TELEPHONE (OUTPATIENT)
Dept: SLEEP MEDICINE | Facility: CLINIC | Age: 85
End: 2025-01-28
Payer: MEDICARE

## 2025-01-28 NOTE — TELEPHONE ENCOUNTER
----- Message from Papito sent at 1/27/2025  4:26 PM CST -----  Regarding: Son Javid  Type: Patient Call Back     What is the request in detail: Pt would like a call back with info on how pt can get a different mask for his machine, Pt doesn't like the nostril mask. Pts son will stop by clinic and show a photo of the mask.     Can the clinic reply by [place]? No     Would the patient rather a call back or a response via My [facility]? Call back    Best call back number: .[phone]      Additional Information:    Thank you.

## 2025-02-17 DIAGNOSIS — J31.0 CHRONIC RHINITIS: ICD-10-CM

## 2025-02-17 RX ORDER — LEVOCETIRIZINE DIHYDROCHLORIDE 5 MG/1
5 TABLET, FILM COATED ORAL NIGHTLY
Qty: 90 TABLET | Refills: 2 | Status: SHIPPED | OUTPATIENT
Start: 2025-02-17

## 2025-02-17 NOTE — TELEPHONE ENCOUNTER
No care due was identified.  Health [facility] Embedded Care Due Messages. Reference number: [id number].   2/17/2025 10:24:24 AM CST

## 2025-02-18 NOTE — TELEPHONE ENCOUNTER
Refill Decision Note   Javid Daniel  is requesting a refill authorization.  Brief Assessment and Rationale for Refill:  Approve     Medication Therapy Plan:         Comments:     Note composed:10:27 PM 02/17/2025             Appointments     Last Visit   9/30/2024 Maxx Mcintosh Jr., MD   Next Visit   3/31/2025 Maxx Mcintosh Jr., MD

## 2025-03-05 DIAGNOSIS — F41.1 GAD (GENERALIZED ANXIETY DISORDER): ICD-10-CM

## 2025-03-05 DIAGNOSIS — F33.0 MDD (MAJOR DEPRESSIVE DISORDER), RECURRENT EPISODE, MILD: ICD-10-CM

## 2025-03-05 RX ORDER — BUSPIRONE HYDROCHLORIDE 15 MG/1
15 TABLET ORAL 3 TIMES DAILY
Qty: 270 TABLET | Refills: 0 | Status: CANCELLED | OUTPATIENT
Start: 2025-03-05

## 2025-03-05 NOTE — TELEPHONE ENCOUNTER
----- Message from Papito sent at 3/5/2025  3:31 PM CST -----  Regarding: Velia Daughter   Type: RX Refill RequestWho Called: Daughter Have you contacted your pharmacy: yes RefillRX Name and Strength:busPIRone (BUSPAR) 15 MG tablet , DULoxetine (CYMBALTA) 20 MG capsule [facility] Pharm[facility] with phone number[facility]  [id number] - [place], [place] - [address] AT [place] [address] [place] [address]Sntjk: [phone] Fax: [phone] or Mail Order: local Would the patient rather a call back or a response via [facility]? Call back Best Call Back Number:.[phone] Information: Pt is out of meds Thank you.  ----- Message -----  From: Papito Narayanan  Sent: 3/5/2025   3:32 PM CST  To: Arnaldo Lilly Staff  Subject: Velia Daughter                                   Type: RX Refill RequestWho Called: Daughter Have you contacted your pharmacy: yes RefillRX Name and Strength:busPIRone (BUSPAR) 15 MG tablet , DULoxetine (CYMBALTA) 20 MG capsule [facility] Pharm[facility] with phone number:  [id number] [place] [facility] [address] AT [place] [address] [address] [place] [address]Aneez: [phone] Fax: [phone] or Mail Order: local Would the patient rather a call back or a response via [facility]? Call back Best Call Back Number:.[phone] Information: Thank you.

## 2025-03-05 NOTE — TELEPHONE ENCOUNTER
Refill Routing Note   Medication(s) are not appropriate for processing by [facility] for the following reason(s):        No active prescription written by provider  Outside of protocol    [facility] action(s):  Defer  Route               Appointments  past 12m or future 3m with PCP    Date Provider   Last Visit   9/30/2024 Maxx Mcintosh Jr., MD   Next Visit   3/31/2025 Maxx Mcintosh Jr., MD   ED visits in past 90 days: 0        Note composed:4:45 PM 03/05/2025

## 2025-03-05 NOTE — TELEPHONE ENCOUNTER
No care due was identified.  Health [facility] Embedded Care Due Messages. Reference number: [id number].   3/05/2025 4:41:15 PM CST   sent/unknown

## 2025-03-10 DIAGNOSIS — F33.0 MDD (MAJOR DEPRESSIVE DISORDER), RECURRENT EPISODE, MILD: ICD-10-CM

## 2025-03-10 DIAGNOSIS — F41.1 GAD (GENERALIZED ANXIETY DISORDER): ICD-10-CM

## 2025-03-10 RX ORDER — BUSPIRONE HYDROCHLORIDE 15 MG/1
15 TABLET ORAL 3 TIMES DAILY
Qty: 270 TABLET | Refills: 0 | Status: CANCELLED | OUTPATIENT
Start: 2025-03-10

## 2025-03-10 NOTE — TELEPHONE ENCOUNTER
No care due was identified.  [facility] Embedded Care Due Messages. Reference number: [id number].   3/10/2025 12:58:58 PM CDT

## 2025-03-13 ENCOUNTER — TELEPHONE (OUTPATIENT)
Dept: PSYCHIATRY | Facility: CLINIC | Age: 85
End: 2025-03-13
Payer: MEDICARE

## 2025-03-13 DIAGNOSIS — F41.1 GAD (GENERALIZED ANXIETY DISORDER): ICD-10-CM

## 2025-03-13 NOTE — TELEPHONE ENCOUNTER
No care due was identified.  [facility] Embedded Care Due Messages. Reference number: [id number].   3/13/2025 2:16:24 PM CDT

## 2025-03-13 NOTE — TELEPHONE ENCOUNTER
----- Message from Jose sent at 3/13/2025  2:06 PM CDT -----  Regarding: daughter  Type: RX Refill RequestWho called:Wade you contacted your pharmacy:Refill or New Rx:refillRX name and strength:Strength:  DULoxetine (CYMBALTA) 20 MG capsulebusPIRone (BUSPAR) 15 MG tabletPreferred pharmacy and phone number:[id number], [place] - [address] AT [address] [place] [address]Ulyvu: [phone] Fax: [phone] provider:ILIANA Franks for the Cymbalata and Dr Mcintosh for the buspironeWould the patient rather a call back or a response via [facility]:Best call back number:[phone] information:Daughter says new prescription because pharmacy says that's what it has to say  ----- Message -----  From: Jose Palma  Sent: 3/13/2025   2:07 PM CDT  To: Arnaldo Lilly Staff; Golden Rogers Staff  Subject: daughter                                         Type: RX Refill RequestWho called:Wade you contacted your pharmacy:Refill or New Rx:refillRX name and strength:Strength:  DULoxetine (CYMBALTA) 20 MG capsulebusPIRone (BUSPAR) 15 MG tabletPreferred pharmacy and phone number:[id number] [place] [facility] [address] AT [address] [place] [address]Uvcmf: [phone] Fax: [phone] provider:ILIANA Franks for the Cymbalata and Dr Mcintosh for the buspironeWould the patient rather a call back or a response via [facility]:Best call back number:[phone] information:

## 2025-03-17 RX ORDER — DULOXETIN HYDROCHLORIDE 20 MG/1
20 CAPSULE, DELAYED RELEASE ORAL DAILY
Qty: 90 CAPSULE | Refills: 0 | Status: SHIPPED | OUTPATIENT
Start: 2025-03-17

## 2025-03-17 RX ORDER — DULOXETIN HYDROCHLORIDE 20 MG/1
20 CAPSULE, DELAYED RELEASE ORAL DAILY
Qty: 90 CAPSULE | Refills: 2 | OUTPATIENT
Start: 2025-03-17

## 2025-03-17 RX ORDER — BUSPIRONE HYDROCHLORIDE 15 MG/1
15 TABLET ORAL 3 TIMES DAILY
Qty: 270 TABLET | Refills: 0 | Status: SHIPPED | OUTPATIENT
Start: 2025-03-17

## 2025-03-21 ENCOUNTER — LAB VISIT (OUTPATIENT)
Dept: LAB | Facility: HOSPITAL | Age: 85
End: 2025-03-21
Attending: FAMILY MEDICINE
Payer: MEDICARE

## 2025-03-21 ENCOUNTER — TELEPHONE (OUTPATIENT)
Dept: FAMILY MEDICINE | Facility: CLINIC | Age: 85
End: 2025-03-21
Payer: MEDICARE

## 2025-03-21 DIAGNOSIS — D64.9 NORMOCYTIC ANEMIA: Chronic | ICD-10-CM

## 2025-03-21 DIAGNOSIS — I10 HYPERTENSION, BENIGN: Chronic | ICD-10-CM

## 2025-03-21 DIAGNOSIS — D69.6 THROMBOCYTOPENIA: Chronic | ICD-10-CM

## 2025-03-21 LAB
ALBUMIN SERPL BCP-MCNC: 3.9 G/DL (ref 3.5–5.2)
ALP SERPL-CCNC: 52 U/L (ref 40–150)
ALT SERPL W/O P-5'-P-CCNC: 8 U/L (ref 10–44)
ANION GAP SERPL CALC-SCNC: 8 MMOL/L (ref 8–16)
AST SERPL-CCNC: 15 U/L (ref 10–40)
BASOPHILS # BLD AUTO: 0.03 K/UL (ref 0–0.2)
BASOPHILS NFR BLD: 0.6 % (ref 0–1.9)
BILIRUB SERPL-MCNC: 0.8 MG/DL (ref 0.1–1)
BUN SERPL-MCNC: 24 MG/DL (ref 8–23)
CALCIUM SERPL-MCNC: 9.2 MG/DL (ref 8.7–10.5)
CHLORIDE SERPL-SCNC: 102 MMOL/L (ref 95–110)
CHOLEST SERPL-MCNC: 133 MG/DL (ref 120–199)
CHOLEST/HDLC SERPL: 2.4 {RATIO} (ref 2–5)
CO2 SERPL-SCNC: 26 MMOL/L (ref 23–29)
CREAT SERPL-MCNC: 1 MG/DL (ref 0.5–1.4)
DIFFERENTIAL METHOD BLD: ABNORMAL
EOSINOPHIL # BLD AUTO: 0.2 K/UL (ref 0–0.5)
EOSINOPHIL NFR BLD: 3.4 % (ref 0–8)
ERYTHROCYTE [DISTWIDTH] IN BLOOD BY AUTOMATED COUNT: 13.2 % (ref 11.5–14.5)
EST. GFR  (NO RACE VARIABLE): >60 ML/MIN/1.73 M^2
FERRITIN SERPL-MCNC: 70 NG/ML (ref 20–300)
GLUCOSE SERPL-MCNC: 93 MG/DL (ref 70–110)
HCT VFR BLD AUTO: 39.7 % (ref 40–54)
HDLC SERPL-MCNC: 56 MG/DL (ref 40–75)
HDLC SERPL: 42.1 % (ref 20–50)
HGB BLD-MCNC: 12.9 G/DL (ref 14–18)
IMM GRANULOCYTES # BLD AUTO: 0.02 K/UL (ref 0–0.04)
IMM GRANULOCYTES NFR BLD AUTO: 0.4 % (ref 0–0.5)
IRON SERPL-MCNC: 69 UG/DL (ref 45–160)
LDLC SERPL CALC-MCNC: 58.4 MG/DL (ref 63–159)
LYMPHOCYTES # BLD AUTO: 1.6 K/UL (ref 1–4.8)
LYMPHOCYTES NFR BLD: 31 % (ref 18–48)
MCH RBC QN AUTO: 30.9 PG (ref 27–31)
MCHC RBC AUTO-ENTMCNC: 32.5 G/DL (ref 32–36)
MCV RBC AUTO: 95 FL (ref 82–98)
MONOCYTES # BLD AUTO: 0.5 K/UL (ref 0.3–1)
MONOCYTES NFR BLD: 10.7 % (ref 4–15)
NEUTROPHILS # BLD AUTO: 2.7 K/UL (ref 1.8–7.7)
NEUTROPHILS NFR BLD: 53.9 % (ref 38–73)
NONHDLC SERPL-MCNC: 77 MG/DL
NRBC BLD-RTO: 0 /100 WBC
PLATELET # BLD AUTO: 158 K/UL (ref 150–450)
PMV BLD AUTO: 12.1 FL (ref 9.2–12.9)
POTASSIUM SERPL-SCNC: 5 MMOL/L (ref 3.5–5.1)
PROT SERPL-MCNC: 6.8 G/DL (ref 6–8.4)
RBC # BLD AUTO: 4.17 M/UL (ref 4.6–6.2)
SATURATED IRON: 21 % (ref 20–50)
SODIUM SERPL-SCNC: 136 MMOL/L (ref 136–145)
TOTAL IRON BINDING CAPACITY: 330 UG/DL (ref 250–450)
TRANSFERRIN SERPL-MCNC: 223 MG/DL (ref 200–375)
TRIGL SERPL-MCNC: 93 MG/DL (ref 30–150)
WBC # BLD AUTO: 5.03 K/UL (ref 3.9–12.7)

## 2025-03-21 PROCEDURE — 80061 LIPID PANEL: CPT | Performed by: FAMILY MEDICINE

## 2025-03-21 PROCEDURE — 80053 COMPREHEN METABOLIC PANEL: CPT | Performed by: FAMILY MEDICINE

## 2025-03-21 PROCEDURE — 36415 COLL VENOUS BLD VENIPUNCTURE: CPT | Mod: PN | Performed by: FAMILY MEDICINE

## 2025-03-21 PROCEDURE — 84466 ASSAY OF TRANSFERRIN: CPT | Performed by: FAMILY MEDICINE

## 2025-03-21 PROCEDURE — 85025 COMPLETE CBC W/AUTO DIFF WBC: CPT | Performed by: FAMILY MEDICINE

## 2025-03-21 PROCEDURE — 82728 ASSAY OF FERRITIN: CPT | Performed by: FAMILY MEDICINE

## 2025-03-21 NOTE — TELEPHONE ENCOUNTER
----- Message from Jadiel Armendariz sent at 3/21/2025  2:36 PM CDT -----  Type:  Patient Requesting ReferralWho Called:Velia Albrecht daughter Referral to What Specialty:Cardiology Reason for Referral:Abnormal lab results Does the patient want the referral with a specific physician?:Is the specialist an [facility] or [facility]?:[facility] Dr Cameron Would the patient rather a call back or a response via [facility]?Callback Best Call Back Number:[phone] Additional Information

## 2025-03-21 NOTE — TELEPHONE ENCOUNTER
Attempt to contact Javid per cardio referral. No answer. Left voice message per  Jerry ID #[id number].

## 2025-03-21 NOTE — TELEPHONE ENCOUNTER
Last Office Visit Info:   The patient's last visit with Maxx Mcintosh Jr., MD was on 9/30/2024.    The patient's last visit in current department was on 10/9/2024.      Upcoming visit 03/31/2025.

## 2025-03-24 ENCOUNTER — RESULTS FOLLOW-UP (OUTPATIENT)
Dept: FAMILY MEDICINE | Facility: CLINIC | Age: 85
End: 2025-03-24

## 2025-03-31 ENCOUNTER — OFFICE VISIT (OUTPATIENT)
Dept: FAMILY MEDICINE | Facility: CLINIC | Age: 85
End: 2025-03-31
Payer: MEDICARE

## 2025-03-31 VITALS
HEART RATE: 75 BPM | OXYGEN SATURATION: 95 % | BODY MASS INDEX: 31.85 KG/M2 | DIASTOLIC BLOOD PRESSURE: 80 MMHG | SYSTOLIC BLOOD PRESSURE: 130 MMHG | WEIGHT: 210.13 LBS | RESPIRATION RATE: 19 BRPM | TEMPERATURE: 98 F | HEIGHT: 68 IN

## 2025-03-31 DIAGNOSIS — F33.0 MDD (MAJOR DEPRESSIVE DISORDER), RECURRENT EPISODE, MILD: Chronic | ICD-10-CM

## 2025-03-31 DIAGNOSIS — N40.1 BENIGN PROSTATIC HYPERPLASIA WITH NOCTURIA: Chronic | ICD-10-CM

## 2025-03-31 DIAGNOSIS — F41.1 GENERALIZED ANXIETY DISORDER: Chronic | ICD-10-CM

## 2025-03-31 DIAGNOSIS — K21.9 GASTROESOPHAGEAL REFLUX DISEASE WITHOUT ESOPHAGITIS: ICD-10-CM

## 2025-03-31 DIAGNOSIS — M47.816 LUMBAR SPONDYLOSIS: Chronic | ICD-10-CM

## 2025-03-31 DIAGNOSIS — J31.0 CHRONIC RHINITIS: ICD-10-CM

## 2025-03-31 DIAGNOSIS — I10 HYPERTENSION, BENIGN: Primary | Chronic | ICD-10-CM

## 2025-03-31 DIAGNOSIS — F11.20 OPIOID DEPENDENCE WITH CURRENT USE: Chronic | ICD-10-CM

## 2025-03-31 DIAGNOSIS — D64.9 NORMOCYTIC ANEMIA: Chronic | ICD-10-CM

## 2025-03-31 DIAGNOSIS — R35.1 BENIGN PROSTATIC HYPERPLASIA WITH NOCTURIA: Chronic | ICD-10-CM

## 2025-03-31 DIAGNOSIS — D69.6 THROMBOCYTOPENIA: Chronic | ICD-10-CM

## 2025-03-31 PROCEDURE — 3075F SYST BP GE 130 - 139MM HG: CPT | Mod: CPTII,S$GLB,, | Performed by: FAMILY MEDICINE

## 2025-03-31 PROCEDURE — 1159F MED LIST DOCD IN RCRD: CPT | Mod: CPTII,S$GLB,, | Performed by: FAMILY MEDICINE

## 2025-03-31 PROCEDURE — 99999 PR PBB SHADOW E&M-EST. PATIENT-LVL IV: CPT | Mod: PBBFAC,,, | Performed by: FAMILY MEDICINE

## 2025-03-31 PROCEDURE — G2211 COMPLEX E/M VISIT ADD ON: HCPCS | Mod: S$GLB,,, | Performed by: FAMILY MEDICINE

## 2025-03-31 PROCEDURE — 1160F RVW MEDS BY RX/DR IN RCRD: CPT | Mod: CPTII,S$GLB,, | Performed by: FAMILY MEDICINE

## 2025-03-31 PROCEDURE — 3079F DIAST BP 80-89 MM HG: CPT | Mod: CPTII,S$GLB,, | Performed by: FAMILY MEDICINE

## 2025-03-31 PROCEDURE — 99214 OFFICE O/P EST MOD 30 MIN: CPT | Mod: S$GLB,,, | Performed by: FAMILY MEDICINE

## 2025-03-31 PROCEDURE — 1126F AMNT PAIN NOTED NONE PRSNT: CPT | Mod: CPTII,S$GLB,, | Performed by: FAMILY MEDICINE

## 2025-03-31 PROCEDURE — 1101F PT FALLS ASSESS-DOCD LE1/YR: CPT | Mod: CPTII,S$GLB,, | Performed by: FAMILY MEDICINE

## 2025-03-31 PROCEDURE — 3288F FALL RISK ASSESSMENT DOCD: CPT | Mod: CPTII,S$GLB,, | Performed by: FAMILY MEDICINE

## 2025-03-31 RX ORDER — AMLODIPINE BESYLATE 10 MG/1
10 TABLET ORAL DAILY
Qty: 90 TABLET | Refills: 3 | Status: SHIPPED | OUTPATIENT
Start: 2025-03-31

## 2025-03-31 RX ORDER — CARVEDILOL 12.5 MG/1
12.5 TABLET ORAL 2 TIMES DAILY WITH MEALS
Qty: 180 TABLET | Refills: 3 | Status: SHIPPED | OUTPATIENT
Start: 2025-03-31

## 2025-03-31 RX ORDER — DOXAZOSIN 4 MG/1
4 TABLET ORAL NIGHTLY
Qty: 90 TABLET | Refills: 3 | Status: SHIPPED | OUTPATIENT
Start: 2025-03-31

## 2025-03-31 RX ORDER — FLUTICASONE PROPIONATE 50 MCG
SPRAY, SUSPENSION (ML) NASAL
Qty: 48 G | Refills: 3 | Status: SHIPPED | OUTPATIENT
Start: 2025-03-31

## 2025-03-31 RX ORDER — BUSPIRONE HYDROCHLORIDE 15 MG/1
15 TABLET ORAL 3 TIMES DAILY
Qty: 270 TABLET | Refills: 2 | Status: SHIPPED | OUTPATIENT
Start: 2025-03-31

## 2025-03-31 RX ORDER — OXYCODONE HYDROCHLORIDE 5 MG/1
5 TABLET ORAL EVERY 8 HOURS PRN
COMMUNITY
Start: 2025-03-28

## 2025-03-31 RX ORDER — ALBUTEROL SULFATE AND BUDESONIDE 90; 80 UG/1; UG/1
2 AEROSOL, METERED RESPIRATORY (INHALATION) EVERY 6 HOURS PRN
COMMUNITY
Start: 2025-02-22

## 2025-03-31 RX ORDER — OLMESARTAN MEDOXOMIL 40 MG/1
40 TABLET ORAL DAILY
Qty: 90 TABLET | Refills: 3 | Status: SHIPPED | OUTPATIENT
Start: 2025-03-31

## 2025-03-31 RX ORDER — LEVOCETIRIZINE DIHYDROCHLORIDE 5 MG/1
5 TABLET, FILM COATED ORAL NIGHTLY
Qty: 90 TABLET | Refills: 2 | Status: SHIPPED | OUTPATIENT
Start: 2025-03-31

## 2025-03-31 RX ORDER — DULOXETIN HYDROCHLORIDE 20 MG/1
20 CAPSULE, DELAYED RELEASE ORAL DAILY
Qty: 90 CAPSULE | Refills: 2 | Status: SHIPPED | OUTPATIENT
Start: 2025-03-31

## 2025-03-31 RX ORDER — TOLMETIN SODIUM 400 MG/1
400 CAPSULE ORAL 2 TIMES DAILY
COMMUNITY
Start: 2025-03-03

## 2025-03-31 RX ORDER — OXYCODONE HYDROCHLORIDE 10 MG/1
10 TABLET ORAL EVERY 8 HOURS PRN
COMMUNITY
Start: 2025-02-26

## 2025-03-31 RX ORDER — PANTOPRAZOLE SODIUM 40 MG/1
40 TABLET, DELAYED RELEASE ORAL DAILY
Qty: 90 TABLET | Refills: 3 | Status: SHIPPED | OUTPATIENT
Start: 2025-03-31 | End: 2026-03-31

## 2025-04-06 NOTE — PROGRESS NOTES
Patient Name: Javid Daniel    : 1940  MRN: [id number]      Subjective:     Patient ID: Javid is a 84 y.o. male    Chief Complaint:  Follow-up    History of Present Illness    84-year-old male presents today for follow-up.    He reports chronic back pain that has improved slightly with current medication regimen. He uses ibuprofen cream topically several times daily and takes oxycodone 3 times daily for pain management. Pain significantly affects his sleep quality, especially with movement.  Pain medication as prescribed by pain management    He monitors blood pressure at home with readings typically around 134 systolic and reports feeling well with current blood pressure levels.    He experiences throat itching and sneezing, managed with Levocetirizine.    He uses albuterol inhaler twice daily and twice nightly.    He takes cholesterol medication, Pantoprazole for acidity, Levocetirizine for allergies, oxycodone 5mg 3 times daily, and uses ibuprofen cream topically. He uses albuterol inhaler 4 times daily.      ROS:  General: -fever, -chills, -fatigue, -weight gain, -weight loss  Eyes: -vision changes, -redness, -discharge  ENT: -ear pain, -nasal congestion, +sore throat  Cardiovascular: -chest pain, -palpitations, -lower extremity edema  Respiratory: -cough, -shortness of breath, +intermittent breathing while sleeping, +apnea  Gastrointestinal: -abdominal pain, -nausea, -vomiting, -diarrhea, -constipation, -blood in stool  Genitourinary: -dysuria, -hematuria, -frequency  Musculoskeletal: -joint pain, -muscle pain, +back pain, +pain with movement  Skin: -rash, -lesion  Neurological: -headache, -dizziness, -numbness, -tingling  Psychiatric: +anxiety, -depression, -sleep difficulty  Allergic: +frequent sneezing         Review of Systems   Constitutional:  Negative for fever and unexpected weight change.   HENT:  Negative for nasal congestion, postnasal drip, rhinorrhea and sinus pressure/congestion.   "  Respiratory:  Negative for shortness of breath and wheezing.    Cardiovascular:  Negative for chest pain and palpitations.   Gastrointestinal:  Negative for abdominal pain and blood in stool.   Genitourinary:  Positive for bladder incontinence. Negative for dysuria.   Musculoskeletal:  Positive for back pain and leg pain.   Neurological:  Negative for dizziness, light-headedness, numbness and headaches.   Hematological:  Does not bruise/bleed easily.   Psychiatric/Behavioral:  Positive for sleep disturbance. Negative for confusion, decreased concentration, dysphoric mood, self-injury and suicidal ideas. The patient is not nervous/anxious.         Objective:   /80 (BP Location: Left arm, Patient Position: Sitting)   Pulse 75   Temp 98 °F (36.7 °C) (Oral)   Resp 19   Ht 5' 8" (1.727 m)   Wt 95.3 kg (210 lb 1.6 oz)   SpO2 95%   BMI 31.95 kg/m²     Physical Exam  Vitals reviewed.   Constitutional:       General: He is not in acute distress.     Appearance: He is well-developed. He is not diaphoretic.      Comments: Using a Rollator for ambulation   HENT:      Head: Normocephalic and atraumatic.      Right Ear: External ear normal.      Left Ear: External ear normal.      Nose: Nose normal.      Mouth/Throat:      Mouth: Mucous membranes are moist.   Eyes:      General:         Right eye: No discharge.         Left eye: No discharge.      Conjunctiva/sclera: Conjunctivae normal.      Pupils: Pupils are equal, round, and reactive to light.   Neck:      Thyroid: No thyromegaly.      Trachea: No tracheal deviation.   Cardiovascular:      Rate and Rhythm: Normal rate and regular rhythm.      Pulses:           Radial pulses are 2+ on the right side and 2+ on the left side.      Heart sounds: Normal heart sounds, S1 normal and S2 normal.   Pulmonary:      Effort: Pulmonary effort is normal. No respiratory distress.      Breath sounds: Normal breath sounds. No wheezing, rhonchi or rales.   Abdominal:      " General: Bowel sounds are normal. There is no distension.      Palpations: Abdomen is soft. Abdomen is not rigid. There is no mass.      Tenderness: There is no abdominal tenderness. There is no guarding.   Musculoskeletal:      Cervical back: Normal range of motion and neck supple.   Lymphadenopathy:      Cervical: No cervical adenopathy.   Skin:     General: Skin is warm and dry.      Capillary Refill: Capillary refill takes less than 2 seconds.      Findings: Purpura: various on upper extremities.   Neurological:      Mental Status: He is alert and oriented to person, place, and time.        Lab Visit on 03/21/2025   Component Date Value Ref Range Status    Sodium 03/21/2025 136  136 - 145 mmol/L Final    Potassium 03/21/2025 5.0  3.5 - 5.1 mmol/L Final    Chloride 03/21/2025 102  95 - 110 mmol/L Final    CO2 03/21/2025 26  23 - 29 mmol/L Final    Glucose 03/21/2025 93  70 - 110 mg/dL Final    BUN 03/21/2025 24 (H)  8 - 23 mg/dL Final    Creatinine 03/21/2025 1.0  0.5 - 1.4 mg/dL Final    Calcium 03/21/2025 9.2  8.7 - 10.5 mg/dL Final    Total Protein 03/21/2025 6.8  6.0 - 8.4 g/dL Final    Albumin 03/21/2025 3.9  3.5 - 5.2 g/dL Final    Total Bilirubin 03/21/2025 0.8  0.1 - 1.0 mg/dL Final    Comment: For infants and newborns, interpretation of results should be based  on gestational age, weight and in agreement with clinical  observations.    Premature Infant recommended reference ranges:  Up to 24 hours.............<8.0 mg/dL  Up to 48 hours............<12.0 mg/dL  3-5 days..................<15.0 mg/dL  6-29 days.................<15.0 mg/dL      Alkaline Phosphatase 03/21/2025 52  40 - 150 U/L Final    AST 03/21/2025 15  10 - 40 U/L Final    ALT 03/21/2025 8 (L)  10 - 44 U/L Final    eGFR 03/21/2025 >60  >60 mL/min/1.73 m^2 Final    Anion Gap 03/21/2025 8  8 - 16 mmol/L Final    WBC 03/21/2025 5.03  3.90 - 12.70 K/uL Final    RBC 03/21/2025 4.17 (L)  4.60 - 6.20 M/uL Final    Hemoglobin 03/21/2025 12.9 (L)   14.0 - 18.0 g/dL Final    Hematocrit 03/21/2025 39.7 (L)  40.0 - 54.0 % Final    MCV 03/21/2025 95  82 - 98 fL Final    MCH 03/21/2025 30.9  27.0 - 31.0 pg Final    MCHC 03/21/2025 32.5  32.0 - 36.0 g/dL Final    RDW 03/21/2025 13.2  11.5 - 14.5 % Final    Platelets 03/21/2025 158  150 - 450 K/uL Final    MPV 03/21/2025 12.1  9.2 - 12.9 fL Final    Immature Granulocytes 03/21/2025 0.4  0.0 - 0.5 % Final    Gran # (ANC) 03/21/2025 2.7  1.8 - 7.7 K/uL Final    Immature Grans (Abs) 03/21/2025 0.02  0.00 - 0.04 K/uL Final    Comment: Mild elevation in immature granulocytes is non specific and   can be seen in a variety of conditions including stress response,   acute inflammation, trauma and pregnancy. Correlation with other   laboratory and clinical findings is essential.      Lymph # 03/21/2025 1.6  1.0 - 4.8 K/uL Final    Mono # 03/21/2025 0.5  0.3 - 1.0 K/uL Final    Eos # 03/21/2025 0.2  0.0 - 0.5 K/uL Final    Baso # 03/21/2025 0.03  0.00 - 0.20 K/uL Final    nRBC 03/21/2025 0  0 /100 WBC Final    Gran % 03/21/2025 53.9  38.0 - 73.0 % Final    Lymph % 03/21/2025 31.0  18.0 - 48.0 % Final    Mono % 03/21/2025 10.7  4.0 - 15.0 % Final    Eosinophil % 03/21/2025 3.4  0.0 - 8.0 % Final    Basophil % 03/21/2025 0.6  0.0 - 1.9 % Final    Differential Method 03/21/2025 Automated   Final    Cholesterol 03/21/2025 133  120 - 199 mg/dL Final    Comment: The National Cholesterol Education Program (NCEP) has set the  following guidelines (reference ranges) for Cholesterol:  Optimal.....................<200 mg/dL  Borderline High.............200-239 mg/dL  High........................> or = 240 mg/dL      Triglycerides 03/21/2025 93  30 - 150 mg/dL Final    Comment: The National Cholesterol Education Program (NCEP) has set the  following guidelines (reference values) for triglycerides:  Normal......................<150 mg/dL  Borderline High.............150-199 mg/dL  High........................200-499 mg/dL      HDL  03/21/2025 56  40 - 75 mg/dL Final    Comment: The National Cholesterol Education Program (NCEP) has set the  following guidelines (reference values) for HDL Cholesterol:  Low...............<40 mg/dL  Optimal...........>60 mg/dL      LDL Cholesterol 03/21/2025 58.4 (L)  63.0 - 159.0 mg/dL Final    Comment: The National Cholesterol Education Program (NCEP) has set the  following guidelines (reference values) for LDL Cholesterol:  Optimal.......................<130 mg/dL  Borderline High...............130-159 mg/dL  High..........................160-189 mg/dL  Very High.....................>190 mg/dL      HDL/Cholesterol Ratio 03/21/2025 42.1  20.0 - 50.0 % Final    Total Cholesterol/HDL Ratio 03/21/2025 2.4  2.0 - 5.0 Final    Non-HDL Cholesterol 03/21/2025 77  mg/dL Final    Comment: Risk category and Non-HDL cholesterol goals:  Coronary heart disease (CHD)or equivalent (10-year risk of CHD >20%):  Non-HDL cholesterol goal     <130 mg/dL  Two or more CHD risk factors and 10-year risk of CHD <= 20%:  Non-HDL cholesterol goal     <160 mg/dL  0 to 1 CHD risk factor:  Non-HDL cholesterol goal     <190 mg/dL      Iron 03/21/2025 69  45 - 160 ug/dL Final    Transferrin 03/21/2025 223  200 - 375 mg/dL Final    TIBC 03/21/2025 330  250 - 450 ug/dL Final    Saturated Iron 03/21/2025 21  20 - 50 % Final    Ferritin 03/21/2025 70  20.0 - 300.0 ng/mL Final      Assessment        ICD-10-CM ICD-9-CM   1. Hypertension, benign  I10 401.1   2. Benign prostatic hyperplasia with nocturia  N40.1 600.01    R35.1 788.43   3. MDD (major depressive disorder), recurrent episode, mild  F33.0 296.31   4. Generalized anxiety disorder  F41.1 300.02   5. Gastroesophageal reflux disease without esophagitis  K21.9 530.81   6. Chronic rhinitis  J31.0 472.0   7. Lumbar spondylosis  M47.816 721.3   8. Opioid dependence with current use  F11.20 304.00   9. Thrombocytopenia  D69.6 287.5   10. Normocytic anemia  D64.9 285.9         Plan:   Assessment &  Plan           1. Hypertension, benign  -     amLODIPine (NORVASC) 10 MG tablet; Take 1 tablet (10 mg total) by mouth once daily.  Dispense: 90 tablet; Refill: 3  -     carvediloL (COREG) 12.5 MG tablet; Take 1 tablet (12.5 mg total) by mouth 2 (two) times daily with meals.  Dispense: 180 tablet; Refill: 3  -     doxazosin (CARDURA) 4 MG tablet; Take 1 tablet (4 mg total) by mouth every evening.  Dispense: 90 tablet; Refill: 3  -     olmesartan (BENICAR) 40 MG tablet; Take 1 tablet (40 mg total) by mouth once daily.  Dispense: 90 tablet; Refill: 3  -     Comprehensive Metabolic Panel; Future; Expected date: 09/30/2025  Fair blood pressure control.    Continue current regimen.      2. Benign prostatic hyperplasia with nocturia  -     doxazosin (CARDURA) 4 MG tablet; Take 1 tablet (4 mg total) by mouth every evening.  Dispense: 90 tablet; Refill: 3  Continue Cardura.      3. MDD (major depressive disorder), recurrent episode, mild  -     DULoxetine (CYMBALTA) 20 MG capsule; Take 1 capsule (20 mg total) by mouth once daily.  Dispense: 90 capsule; Refill: 2  Reports depression doing fair with duloxetine.    Continue duloxetine.      4. Generalized anxiety disorder  -     DULoxetine (CYMBALTA) 20 MG capsule; Take 1 capsule (20 mg total) by mouth once daily.  Dispense: 90 capsule; Refill: 2  -     busPIRone (BUSPAR) 15 MG tablet; Take 1 tablet (15 mg total) by mouth 3 (three) times daily.  Dispense: 270 tablet; Refill: 2  Continue duloxetine and buspirone.      5. Gastroesophageal reflux disease without esophagitis  -     pantoprazole (PROTONIX) 40 MG tablet; Take 1 tablet (40 mg total) by mouth once daily.  Dispense: 90 tablet; Refill: 3  Continues to benefit from pantoprazole.    Continue pantoprazole.      6. Chronic rhinitis  -     levocetirizine (XYZAL) 5 MG tablet; Take 1 tablet (5 mg total) by mouth every evening.  Dispense: 90 tablet; Refill: 2  -     fluticasone propionate (FLONASE) 50 mcg/actuation nasal spray;  SHAKE LIQUID AND USE 2 SPRAYS(100 MCG) IN EACH NOSTRIL EVERY DAY  Dispense: 48 g; Refill: 3    7. Lumbar spondylosis/ 8. Opioid dependence with current use  Overview:  Prescribed hydrocodone-acetaminophen by Orthopedics  Patient under care of pain management.    Continue recommendations by pain management.      9. Thrombocytopenia  Overview:  Platelets   Date Value Ref Range Status   03/21/2025 158 150 - 450 K/uL Final   09/23/2024 143 (L) 150 - 450 K/uL Final   07/02/2024 129 (L) 150 - 450 K/uL Final   03/15/2024 149 (L) 150 - 450 K/uL Final       Orders:  -     CBC Auto Differential; Future; Expected date: 09/30/2025  Platelet count stable.    Continue monitoring platelets.    10. Normocytic anemia  Overview:  Lab Results   Component Value Date    HGB 12.9 (L) 03/21/2025    HGB 13.1 (L) 09/23/2024    HGB 12.0 (L) 07/02/2024      Lab Results   Component Value Date    HCT 39.7 (L) 03/21/2025    HCT 40.3 09/23/2024    HCT 38.3 (L) 07/02/2024     Lab Results   Component Value Date    MCV 95 03/21/2025    MCV 96 09/23/2024    MCV 95 07/02/2024      Lab Results   Component Value Date    RETIC 1.7 04/25/2023    RETIC 1.7 04/06/2023    RETIC 1.6 07/05/2022       Lab Results   Component Value Date    IRON 69 03/21/2025    IRON 70 11/13/2023    IRON 79 04/25/2023      Lab Results   Component Value Date    FESATURATED 21 03/21/2025    FESATURATED 25 11/13/2023    FESATURATED 25 04/25/2023      Lab Results   Component Value Date    TIBC 330 03/21/2025    TIBC 283 11/13/2023    TIBC 320 04/25/2023      Lab Results   Component Value Date    FERRITIN 70 03/21/2025    FERRITIN 121 11/13/2023    FERRITIN 119 04/25/2023     Lab Results   Component Value Date    TRANSFERRIN 223 03/21/2025    TRANSFERRIN 191 (L) 11/13/2023    TRANSFERRIN 216 04/25/2023      Lab Results   Component Value Date    SEDRATE 11 12/14/2023    SEDRATE 8 04/25/2023    SEDRATE 5 06/28/2022      Lab Results   Component Value Date    CRP 2.7 12/14/2023         Orders:  -     CBC Auto Differential; Future; Expected date: 09/30/2025  Blood count stable.    Continue monitoring.           -Maxx Mcintosh Jr., MD, AAHIVS      This note was generated with the assistance of ambient listening technology. Verbal consent was obtained by the patient and accompanying visitor(s) for the recording of patient appointment to facilitate this note. I attest to having reviewed and edited the generated note for accuracy, though some syntax or spelling errors may persist. Please contact the author of this note for any clarification.      There are no Patient Instructions on file for this visit.      Follow up in about 6 months (around 9/30/2025) for Chronic Problems (labs a week prior).   Future Appointments   Date Time Provider Department Center   4/21/2025  3:00 PM Lucy Nixon MD [facility]   9/25/2025  9:15 AM LAB, [facility] DRAW STATION [facility] LAB [facility]   10/2/2025 10:40 AM Maxx Mcintosh Jr., MD [facility]

## 2025-04-21 ENCOUNTER — OFFICE VISIT (OUTPATIENT)
Dept: PULMONOLOGY | Facility: CLINIC | Age: 85
End: 2025-04-21
Payer: MEDICARE

## 2025-04-21 ENCOUNTER — LAB VISIT (OUTPATIENT)
Dept: LAB | Facility: HOSPITAL | Age: 85
End: 2025-04-21
Attending: INTERNAL MEDICINE
Payer: MEDICARE

## 2025-04-21 VITALS
HEIGHT: 68 IN | SYSTOLIC BLOOD PRESSURE: 149 MMHG | OXYGEN SATURATION: 95 % | BODY MASS INDEX: 31.95 KG/M2 | DIASTOLIC BLOOD PRESSURE: 92 MMHG | HEART RATE: 84 BPM

## 2025-04-21 DIAGNOSIS — J45.30 MILD PERSISTENT ASTHMA WITHOUT COMPLICATION: ICD-10-CM

## 2025-04-21 DIAGNOSIS — G47.33 OSA ON CPAP: ICD-10-CM

## 2025-04-21 DIAGNOSIS — E66.9 OBESITY (BMI 30-39.9): ICD-10-CM

## 2025-04-21 DIAGNOSIS — J98.4 RESTRICTIVE LUNG DISEASE: ICD-10-CM

## 2025-04-21 DIAGNOSIS — J84.9 INTERSTITIAL PULMONARY DISEASE, UNSPECIFIED: Primary | ICD-10-CM

## 2025-04-21 PROCEDURE — 99999 PR PBB SHADOW E&M-EST. PATIENT-LVL IV: CPT | Mod: PBBFAC,,, | Performed by: INTERNAL MEDICINE

## 2025-04-21 PROCEDURE — 1101F PT FALLS ASSESS-DOCD LE1/YR: CPT | Mod: CPTII,S$GLB,, | Performed by: INTERNAL MEDICINE

## 2025-04-21 PROCEDURE — 3077F SYST BP >= 140 MM HG: CPT | Mod: CPTII,S$GLB,, | Performed by: INTERNAL MEDICINE

## 2025-04-21 PROCEDURE — 36415 COLL VENOUS BLD VENIPUNCTURE: CPT

## 2025-04-21 PROCEDURE — 1160F RVW MEDS BY RX/DR IN RCRD: CPT | Mod: CPTII,S$GLB,, | Performed by: INTERNAL MEDICINE

## 2025-04-21 PROCEDURE — 3080F DIAST BP >= 90 MM HG: CPT | Mod: CPTII,S$GLB,, | Performed by: INTERNAL MEDICINE

## 2025-04-21 PROCEDURE — 1159F MED LIST DOCD IN RCRD: CPT | Mod: CPTII,S$GLB,, | Performed by: INTERNAL MEDICINE

## 2025-04-21 PROCEDURE — 1125F AMNT PAIN NOTED PAIN PRSNT: CPT | Mod: CPTII,S$GLB,, | Performed by: INTERNAL MEDICINE

## 2025-04-21 PROCEDURE — 3288F FALL RISK ASSESSMENT DOCD: CPT | Mod: CPTII,S$GLB,, | Performed by: INTERNAL MEDICINE

## 2025-04-21 PROCEDURE — 86682 HELMINTH ANTIBODY: CPT

## 2025-04-21 PROCEDURE — 99204 OFFICE O/P NEW MOD 45 MIN: CPT | Mod: S$GLB,,, | Performed by: INTERNAL MEDICINE

## 2025-04-21 RX ORDER — FLUTICASONE PROPIONATE AND SALMETEROL 250; 50 UG/1; UG/1
1 POWDER RESPIRATORY (INHALATION) 2 TIMES DAILY
Qty: 60 EACH | Refills: 11 | Status: SHIPPED | OUTPATIENT
Start: 2025-04-21 | End: 2026-04-21

## 2025-04-21 NOTE — PROGRESS NOTES
General Pulmonary Clinic  New Patient Visit    Chief Complaint: wheezing and cogu     HPI     Javid Daniel is a 84 y.o. male with possible reactive airway disease syndrome. DM,, HTN, severe DRU on cPAP, GERD, hx of pancytopenia/possible early MDS presenting with chief complaint of wheezing and cough    # cough/wheezing  # shortness of breath  # DRU      He has hx of coughing and wheezing that began 7 months ago. He was seen in the [facility] in ED and rx ICS/PHILIP but not using as rx. He saw Dr. Whitt at [facility], pfts at this time noted no obstruction but did not exhale for 6 seconds, restriction and mild mod reduced DLCO   He was prescribe albuterol and using 1-2 x per day  He is only able to walk a limited asmount because of his back    He denies acid reflux and no dysphagia  He has tickling in the back of his throat with post nasal drip  Recently dx w/ severe DRU AHI 43 rx auto CPAP 6-15     Childhood: no history of wheezing, coughing, asthma;     Exposures  no smoking  no marijuana  no mold or water damage in home  Pets - dog  Occupation: used to be farmer - cotton,  rice, beans.   Born in [facility], emigrated here 20 years ago, unsure if he was exposed to t  Records reviewed with the following findings ---    3/2025       Objective   Past History     Past Medical History:  Past Medical History:   Diagnosis Date    Anxiety     GERD (gastroesophageal reflux disease)     Hypertension     Nuclear sclerosis of both eyes 03/08/2021    Primary osteoarthritis of both knees 04/09/2023    Type 2 diabetes mellitus without complication, unspecified whether long term insulin use 1/2/2024         Past Surgical History:  No past surgical history on file.      Social History:   Social History     Socioeconomic History    Marital status:    Tobacco Use    Smoking status: Never    Smokeless tobacco: Never   Substance and Sexual Activity    Alcohol use: Not Currently    Drug use: Never    Sexual activity: Yes  "    Social Drivers of Health     Financial Resource Strain: High Risk (10/9/2024)    Overall Financial Resource Strain (CARDIA)     Difficulty of Paying Living Expenses: Very hard   Food Insecurity: Food Insecurity Present (10/9/2024)    Hunger Vital Sign     Worried About Running Out of Food in the Last Year: Sometimes true     Ran Out of Food in the Last Year: Sometimes true   Transportation Needs: No Transportation Needs (10/9/2024)    PRAPARE - Transportation     Lack of Transportation (Medical): No     Lack of Transportation (Non-Medical): No   Physical Activity: Insufficiently Active (10/9/2024)    Exercise Vital Sign     Days of Exercise per Week: 5 days     Minutes of Exercise per Session: 10 min   Stress: No Stress Concern Present (10/9/2024)    [employer] - Occupational Stress Questionnaire     Feeling of Stress : Not at all   Housing Stability: High Risk (10/9/2024)    Housing Stability Vital Sign     Unable to Pay for Housing in the Last Year: Yes     Homeless in the Last Year: Yes         Family Hx:  No family history of pulmonary fibrosis, pre-mature graying of hair, cirrhosis, or hematologic malignancies  No family history of emphysema or spontaneous PTX  no family history of lung cancer or lymphoma    Allergies and Pertinent Medications   Allergies and Medications Reviewed    Patient has no known allergies.  [unfilled]             Physical Exam   BP (!) 149/92   Pulse 84   Ht 5' 8" (1.727 m)   SpO2 95%   BMI 31.95 kg/m²       Constitutional: No acute distress, Atraumatic   HEENT: moist mucus membranes, extraocular movements intact  Cardiovascular: regular rate and rhythm, no murmurs, rubs or gallops  Pulmonary: normal respiratory rate and chest rise, no chest wall deformity, normal breath sounds with no wheezing or crackles  Abdominal: non-distended, bowel sounds present  Musculoskeletal: No lower extremity edema, no clubbing  Neurological: normal speech/tiana, " "moves all extremities against gravity  Skin: no finger cyanosis, no rashes on exposed body parts  Psych: Appropriate affect, normal mood       Diagnostic Studies      All diagnostic studies relevant to chief complaint reviewed personally    Labs:  Lab Results   Component Value Date    WBC 5.03 03/21/2025    WBC 4.8 12/03/2019    HGB 12.9 (L) 03/21/2025     03/21/2025    MCV 95 03/21/2025    MCV 95 12/03/2019     Lab Results   Component Value Date     03/21/2025     12/03/2019    K 5.0 03/21/2025    K 4.4 12/03/2019    CO2 26 03/21/2025    CO2 27 12/03/2019    BUN 24 (H) 03/21/2025    BUN 15 12/03/2019    CREATININE 1.0 03/21/2025    CREATININE 0.9 12/03/2019     Lab Results   Component Value Date    AST 15 03/21/2025    ALT 8 (L) 03/21/2025    ALT 10 12/03/2019    ALBUMIN 3.9 03/21/2025    ALBUMIN 4.8 12/03/2019    PROT 6.8 03/21/2025    BILITOT 0.8 03/21/2025         PFTs:  Pulmonary Functions Testing Results:  No results found for: "FEV1", "FVC", "SXR2DLE", "TLC", "DLCO"         TTE:  No results found for this or any previous visit.            Assessment and Plan   Javid Daniel is a 84 y.o. male  with possible reactive airway disease syndrome. DM,, HTN, severe DRU on cPAP, GERD, hx of pancytopenia/possible early MDS presenting with chief complaint of wheezing and cough    Problem List:  # possible airway disease - acute, progressive - late onset of airway disease symptoms without exposure hx to tobacco or childhood hx of asthma raises concerns for atypical causes such as strongyloides or hypersensitivity pneumonitis. PFTs w/out clear obstruction. Will repeat PFTs. Strongyloides IgG, ct chest as below. Start advair 250 1 puff bid w/ albuterol PRN  # restrictive lung disease - acute, unclear stability - unclear if related to obesity although pattern consistent w/ ILD. Plan for CT chest  # severe DRU on CPAP -- acute, stable - continue cpap use, follow up w/ sleep medicine  # obesity - " chronic, stable - significant central obesity, encouraged weight loss    Explained to the patient I work Monday-Thursdays, so any results or messages received after working hours Thursday through Monday AM would not be addressed until I was back in the office. If he/she experienced any acute symptoms he/she should go the emergency room for immediate help.    Differential, diagnoses, diagnostic work up, and possible treatments were discussed with the patient. Questions were answered.    Lucy Nxion MD  Pulmonary-Critical Care Medicine              For this visit, the following time was spent  preparing to see the patient (e.g., review of tests) 12 minutes  obtaining and/or reviewing separately obtained history 0 minutes  Performing a medically necessary appropriate examination and/or evaluation 13 minutes  Counseling and educating the patient/family/caregiver 13 minutes  Ordering medications, tests, or procedures 2 minutes  Referring and communicating with other health care professionals (when not reported separately) 0minutes  Documenting clinical information in the electronic or other health record 5minutes  Care coordination (not reported separately) 0minutes    Total time = 45 minutes

## 2025-04-21 NOTE — PATIENT INSTRUCTIONS
I would like to prescribe you an inhaler. If the inhaler is expensive, please ask the pharmacy if it is because of your deductible or if your insurance prefers a different inhaler -- please get the names of those inhalers and message me back if so.      I am prescribing you fluticasone-salmeterol to help with your breathing. Please take 1 puff in the morning and 1 puff in the evening every day.  Please ensure your gargle after each use. There are little to no immediate side effects with this medication as there is minimal absorption to the blood. A low grade yeast infection in the mouth called thrush can develop if you do not rinse/gargle your mouth after use      I am also prescribing an albuterol inhaler to take AS NEEDED for shortness of breath above your baseline. This medication can increase your heart rate. If using for more than 2 times per day, please let me know.    Please watch a video on appropriate use of your inhaler as it is crucial that is delivered to your airways (and not the back of your mouth to be effective.)    Videos:    How to use a meter-dosed inhaler: https://www.Navionicsube.com/watch?v=9ipqxF-4p5g    How to use a spacer (Aerochamber) with meter-dosed inhaler: https://www.[employer]ionicsube.com/watch?v=ltB-ZOjlkgY      How to use a diskus inhaler: https://youtu.be/HX7rxe4HymC      For General information on Inhalers:    https://www.nationaljewish.org/conditions/medications/asthma-medications/devices

## 2025-04-24 LAB — STRONGYLOIDES IGG SER QL IA: NEGATIVE

## 2025-04-29 ENCOUNTER — RESULTS FOLLOW-UP (OUTPATIENT)
Dept: PULMONOLOGY | Facility: CLINIC | Age: 85
End: 2025-04-29

## 2025-04-30 ENCOUNTER — HOSPITAL ENCOUNTER (OUTPATIENT)
Dept: RESPIRATORY THERAPY | Facility: HOSPITAL | Age: 85
Discharge: HOME | End: 2025-04-30
Attending: INTERNAL MEDICINE
Payer: MEDICARE

## 2025-04-30 ENCOUNTER — HOSPITAL ENCOUNTER (OUTPATIENT)
Dept: RADIOLOGY | Facility: HOSPITAL | Age: 85
Discharge: HOME | End: 2025-04-30
Attending: INTERNAL MEDICINE
Payer: MEDICARE

## 2025-04-30 VITALS — HEART RATE: 78 BPM | OXYGEN SATURATION: 98 % | RESPIRATION RATE: 18 BRPM

## 2025-04-30 DIAGNOSIS — J98.4 RESTRICTIVE LUNG DISEASE: ICD-10-CM

## 2025-04-30 DIAGNOSIS — J84.9 INTERSTITIAL PULMONARY DISEASE, UNSPECIFIED: ICD-10-CM

## 2025-04-30 LAB
BRPFT: NORMAL
DLCO ADJ PRE: 14.9 ML/(MIN*MMHG)
DLCO SINGLE BREATH LLN: 16.3
DLCO SINGLE BREATH PRE REF: 60.9 %
DLCO SINGLE BREATH REF: 23.23
DLCOC SBVA LLN: 2.26
DLCOC SBVA PRE REF: 119.9 %
DLCOC SBVA REF: 3.44
DLCOC SINGLE BREATH LLN: 16.3
DLCOC SINGLE BREATH PRE REF: 64.2 %
DLCOC SINGLE BREATH REF: 23.23
DLCOVA LLN: 2.26
DLCOVA PRE REF: 113.7 %
DLCOVA PRE: 3.92 ML/(MIN*MMHG*L)
DLCOVA REF: 3.44
DLVAADJ PRE: 4.13 ML/(MIN*MMHG*L)
ERVN2 LLN: -16449.14
ERVN2 PRE REF: 19.9 %
ERVN2 PRE: 0.17 L
ERVN2 REF: 0.86
FEF 25 75 CHG: 5.3 %
FEF 25 75 LLN: 0.82
FEF 25 75 POST REF: 89.8 %
FEF 25 75 PRE REF: 85.3 %
FEF 25 75 REF: 2.53
FET100 CHG: -9.5 %
FEV1 CHG: 1.2 %
FEV1 FVC CHG: 1.8 %
FEV1 FVC LLN: 59
FEV1 FVC POST REF: 112.6 %
FEV1 FVC PRE REF: 110.6 %
FEV1 FVC REF: 75
FEV1 LLN: 1.8
FEV1 POST REF: 64.3 %
FEV1 PRE REF: 63.5 %
FEV1 REF: 2.63
FRCN2 LLN: 2.71
FRCN2 PRE REF: 46 %
FRCN2 REF: 3.7
FVC CHG: -0.6 %
FVC LLN: 2.55
FVC POST REF: 56.5 %
FVC PRE REF: 56.9 %
FVC REF: 3.56
IVC PRE: 1.81 L
IVC SINGLE BREATH LLN: 2.55
IVC SINGLE BREATH PRE REF: 50.9 %
IVC SINGLE BREATH REF: 3.56
PEF CHG: -27.3 %
PEF LLN: 4.37
PEF POST REF: 45 %
PEF PRE REF: 62 %
PEF REF: 6.56
POST FEF 25 75: 2.27 L/S
POST FET 100: 6.89 SEC
POST FEV1 FVC: 84.05 %
POST FEV1: 1.69 L
POST FVC: 2.01 L
POST PEF: 2.95 L/S
PRE DLCO: 14.13 ML/(MIN*MMHG)
PRE FEF 25 75: 2.16 L/S
PRE FET 100: 7.61 SEC
PRE FEV1 FVC: 82.57 %
PRE FEV1: 1.67 L
PRE FRC N2: 1.7 L
PRE FVC: 2.02 L
PRE PEF: 4.07 L/S
RVN2 LLN: 2.17
RVN2 PRE REF: 47.2 %
RVN2 PRE: 1.34 L
RVN2 REF: 2.84
RVN2TLCN2 LLN: 36.96
RVN2TLCN2 PRE REF: 86.1 %
RVN2TLCN2 PRE: 39.56 %
RVN2TLCN2 REF: 45.94
TLCN2 LLN: 5.59
TLCN2 PRE REF: 50.3 %
TLCN2 PRE: 3.39 L
TLCN2 REF: 6.74
VA PRE: 3.65 L
VA SINGLE BREATH LLN: 6.59
VA SINGLE BREATH PRE REF: 55.4 %
VA SINGLE BREATH REF: 6.59
VCMAXN2 LLN: 2.55
VCMAXN2 PRE REF: 57.6 %
VCMAXN2 PRE: 2.05 L
VCMAXN2 REF: 3.56

## 2025-04-30 PROCEDURE — 71250 CT THORAX DX C-: CPT | Mod: 26,,, | Performed by: RADIOLOGY

## 2025-04-30 PROCEDURE — 71250 CT THORAX DX C-: CPT | Mod: TC

## 2025-04-30 PROCEDURE — 94200 LUNG FUNCTION TEST (MBC/MVV): CPT

## 2025-04-30 PROCEDURE — 94729 DIFFUSING CAPACITY: CPT

## 2025-04-30 PROCEDURE — 25000242 PHARM REV CODE 250 ALT 637 W/ HCPCS: Performed by: INTERNAL MEDICINE

## 2025-04-30 RX ORDER — ALBUTEROL SULFATE 2.5 MG/.5ML
2.5 SOLUTION RESPIRATORY (INHALATION) ONCE
Status: COMPLETED | OUTPATIENT
Start: 2025-04-30 | End: 2025-04-30

## 2025-04-30 RX ADMIN — ALBUTEROL SULFATE 2.5 MG: 2.5 SOLUTION RESPIRATORY (INHALATION) at 01:04

## 2025-05-01 ENCOUNTER — TELEPHONE (OUTPATIENT)
Dept: PULMONOLOGY | Facility: CLINIC | Age: 85
End: 2025-05-01
Payer: MEDICARE

## 2025-05-01 DIAGNOSIS — R91.1 SOLITARY PULMONARY NODULE: Primary | ICD-10-CM

## 2025-05-01 DIAGNOSIS — R91.8 MULTIPLE LUNG NODULES: ICD-10-CM

## 2025-05-01 DIAGNOSIS — J45.30 MILD PERSISTENT ASTHMA, UNSPECIFIED WHETHER COMPLICATED: ICD-10-CM

## 2025-05-01 DIAGNOSIS — T17.908A ASPIRATION INTO AIRWAY, INITIAL ENCOUNTER: ICD-10-CM

## 2025-05-01 NOTE — TELEPHONE ENCOUNTER
Attempted to contact pt to get his testings that Dr DEVI ordered set up. No answer LVM for pt to call back.

## 2025-05-06 ENCOUNTER — RESULTS FOLLOW-UP (OUTPATIENT)
Dept: PULMONOLOGY | Facility: CLINIC | Age: 85
End: 2025-05-06

## 2025-05-06 ENCOUNTER — HOSPITAL ENCOUNTER (OUTPATIENT)
Dept: RADIOLOGY | Facility: HOSPITAL | Age: 85
Discharge: HOME | End: 2025-05-06
Attending: INTERNAL MEDICINE
Payer: MEDICARE

## 2025-05-06 DIAGNOSIS — J45.30 MILD PERSISTENT ASTHMA WITHOUT COMPLICATION: Primary | ICD-10-CM

## 2025-05-06 DIAGNOSIS — T17.908A ASPIRATION INTO AIRWAY, INITIAL ENCOUNTER: ICD-10-CM

## 2025-05-06 PROCEDURE — 74220 X-RAY XM ESOPHAGUS 1CNTRST: CPT | Mod: TC

## 2025-05-06 PROCEDURE — 25500020 PHARM REV CODE 255: Performed by: INTERNAL MEDICINE

## 2025-05-06 PROCEDURE — A9698 NON-RAD CONTRAST MATERIALNOC: HCPCS | Performed by: INTERNAL MEDICINE

## 2025-05-06 PROCEDURE — 74220 X-RAY XM ESOPHAGUS 1CNTRST: CPT | Mod: 26,,, | Performed by: RADIOLOGY

## 2025-05-06 RX ADMIN — BARIUM SULFATE 200 ML: 0.6 SUSPENSION ORAL at 10:05

## 2025-05-12 DIAGNOSIS — F41.1 GENERALIZED ANXIETY DISORDER: Chronic | ICD-10-CM

## 2025-05-12 DIAGNOSIS — F33.0 MDD (MAJOR DEPRESSIVE DISORDER), RECURRENT EPISODE, MILD: Chronic | ICD-10-CM

## 2025-05-12 NOTE — TELEPHONE ENCOUNTER
No care due was identified.  [facility] Embedded Care Due Messages. Reference number: [id number].   5/12/2025 3:12:07 PM CDT

## 2025-05-12 NOTE — TELEPHONE ENCOUNTER
----- Message from Keerthi sent at 5/12/2025  3:02 PM CDT -----  Who Called: son   Refill or New Rx: refill   RX Name and Strength: busPIRone (BUSPAR) 15 MG tablet and DULoxetine (CYMBALTA) 20 MG capsule  Is this a 30 day or 90 day RX:  Preferred Pharmacy with phone number: .[id number] [place] - [address] AT [facility] [place] [address] [id number]: [phone] Fax: [phone]  Would the patient rather a call back or a response via [facility]? Call back   Best Call Back Number: .[phone]  Additional Information:

## 2025-05-13 ENCOUNTER — PATIENT MESSAGE (OUTPATIENT)
Dept: PULMONOLOGY | Facility: CLINIC | Age: 85
End: 2025-05-13
Payer: MEDICARE

## 2025-05-15 RX ORDER — BUSPIRONE HYDROCHLORIDE 15 MG/1
15 TABLET ORAL 3 TIMES DAILY
Qty: 270 TABLET | Refills: 2 | Status: SHIPPED | OUTPATIENT
Start: 2025-05-15

## 2025-05-15 RX ORDER — DULOXETIN HYDROCHLORIDE 20 MG/1
20 CAPSULE, DELAYED RELEASE ORAL DAILY
Qty: 90 CAPSULE | Refills: 2 | Status: SHIPPED | OUTPATIENT
Start: 2025-05-15